# Patient Record
Sex: MALE | Race: WHITE | Employment: OTHER | URBAN - NONMETROPOLITAN AREA
[De-identification: names, ages, dates, MRNs, and addresses within clinical notes are randomized per-mention and may not be internally consistent; named-entity substitution may affect disease eponyms.]

---

## 2019-05-29 LAB
ALBUMIN SERPL-MCNC: 3.9 G/DL
ALP BLD-CCNC: 44 U/L
ALT SERPL-CCNC: 22 U/L
ANION GAP SERPL CALCULATED.3IONS-SCNC: 1.2 MMOL/L
AST SERPL-CCNC: 21 U/L
BASOPHILS ABSOLUTE: NORMAL /ΜL
BASOPHILS RELATIVE PERCENT: NORMAL %
BILIRUB SERPL-MCNC: 1 MG/DL (ref 0.1–1.4)
BUN BLDV-MCNC: 17 MG/DL
CALCIUM SERPL-MCNC: 8.7 MG/DL
CHLORIDE BLD-SCNC: 105 MMOL/L
CHOLESTEROL, TOTAL: 174 MG/DL
CHOLESTEROL/HDL RATIO: 2.1
CO2: 24 MMOL/L
CREAT SERPL-MCNC: 1 MG/DL
EOSINOPHILS ABSOLUTE: NORMAL /ΜL
EOSINOPHILS RELATIVE PERCENT: NORMAL %
GFR CALCULATED: NORMAL
GLUCOSE BLD-MCNC: 107 MG/DL
HCT VFR BLD CALC: 41.5 % (ref 41–53)
HDLC SERPL-MCNC: 54 MG/DL (ref 35–70)
HEMOGLOBIN: 14.2 G/DL (ref 13.5–17.5)
LDL CHOLESTEROL CALCULATED: 113 MG/DL (ref 0–160)
LYMPHOCYTES ABSOLUTE: NORMAL /ΜL
LYMPHOCYTES RELATIVE PERCENT: NORMAL %
MCH RBC QN AUTO: NORMAL PG
MCHC RBC AUTO-ENTMCNC: NORMAL G/DL
MCV RBC AUTO: NORMAL FL
MONOCYTES ABSOLUTE: NORMAL /ΜL
MONOCYTES RELATIVE PERCENT: NORMAL %
NEUTROPHILS ABSOLUTE: NORMAL /ΜL
NEUTROPHILS RELATIVE PERCENT: NORMAL %
PLATELET # BLD: 192 K/ΜL
PMV BLD AUTO: NORMAL FL
POTASSIUM SERPL-SCNC: 4.2 MMOL/L
RBC # BLD: 4.32 10^6/ΜL
SODIUM BLD-SCNC: 136 MMOL/L
TOTAL PROTEIN: 7.2
TRIGL SERPL-MCNC: 104 MG/DL
VLDLC SERPL CALC-MCNC: NORMAL MG/DL
WBC # BLD: 4.4 10^3/ML

## 2019-06-05 ENCOUNTER — OFFICE VISIT (OUTPATIENT)
Dept: PRIMARY CARE CLINIC | Age: 69
End: 2019-06-05
Payer: MEDICARE

## 2019-06-05 VITALS
SYSTOLIC BLOOD PRESSURE: 118 MMHG | BODY MASS INDEX: 31.55 KG/M2 | WEIGHT: 213 LBS | HEART RATE: 66 BPM | TEMPERATURE: 98.2 F | OXYGEN SATURATION: 97 % | HEIGHT: 69 IN | RESPIRATION RATE: 16 BRPM | DIASTOLIC BLOOD PRESSURE: 72 MMHG

## 2019-06-05 DIAGNOSIS — F41.9 ANXIETY: ICD-10-CM

## 2019-06-05 DIAGNOSIS — K51.80 OTHER ULCERATIVE COLITIS WITHOUT COMPLICATION (HCC): ICD-10-CM

## 2019-06-05 DIAGNOSIS — E78.2 MIXED HYPERLIPIDEMIA: ICD-10-CM

## 2019-06-05 DIAGNOSIS — I10 ESSENTIAL HYPERTENSION: Primary | ICD-10-CM

## 2019-06-05 PROBLEM — F34.1 DYSTHYMIA: Status: ACTIVE | Noted: 2019-06-05

## 2019-06-05 PROBLEM — K51.90 ULCERATIVE COLITIS (HCC): Status: ACTIVE | Noted: 2019-06-05

## 2019-06-05 PROCEDURE — 99213 OFFICE O/P EST LOW 20 MIN: CPT | Performed by: INTERNAL MEDICINE

## 2019-06-05 RX ORDER — SERTRALINE HYDROCHLORIDE 100 MG/1
1 TABLET, FILM COATED ORAL DAILY
COMMUNITY
Start: 2019-05-13 | End: 2019-06-05 | Stop reason: SDUPTHER

## 2019-06-05 RX ORDER — LISINOPRIL 5 MG/1
1 TABLET ORAL DAILY
COMMUNITY
Start: 2019-05-13 | End: 2019-06-05 | Stop reason: SDUPTHER

## 2019-06-05 RX ORDER — LISINOPRIL 5 MG/1
5 TABLET ORAL DAILY
Qty: 90 TABLET | Refills: 3 | Status: SHIPPED | OUTPATIENT
Start: 2019-06-05 | End: 2019-12-11

## 2019-06-05 RX ORDER — MERCAPTOPURINE 50 MG/1
50 TABLET ORAL 2 TIMES DAILY
Qty: 180 TABLET | Refills: 3 | Status: SHIPPED | OUTPATIENT
Start: 2019-06-05 | End: 2019-12-11 | Stop reason: SDUPTHER

## 2019-06-05 RX ORDER — FOLIC ACID 1 MG/1
1 TABLET ORAL DAILY
COMMUNITY
Start: 2018-05-23 | End: 2019-06-05 | Stop reason: SDUPTHER

## 2019-06-05 RX ORDER — ATORVASTATIN CALCIUM 10 MG/1
10 TABLET, FILM COATED ORAL DAILY
Qty: 90 TABLET | Refills: 3 | Status: SHIPPED | OUTPATIENT
Start: 2019-06-05 | End: 2019-12-11 | Stop reason: SDUPTHER

## 2019-06-05 RX ORDER — MERCAPTOPURINE 50 MG/1
1 TABLET ORAL 2 TIMES DAILY
COMMUNITY
Start: 2018-06-19 | End: 2019-06-05 | Stop reason: SDUPTHER

## 2019-06-05 RX ORDER — ATORVASTATIN CALCIUM 10 MG/1
1 TABLET, FILM COATED ORAL DAILY
COMMUNITY
Start: 2018-05-23 | End: 2019-06-05 | Stop reason: SDUPTHER

## 2019-06-05 RX ORDER — FOLIC ACID 1 MG/1
1000 TABLET ORAL DAILY
Qty: 90 TABLET | Refills: 3 | Status: SHIPPED | OUTPATIENT
Start: 2019-06-05 | End: 2019-12-11 | Stop reason: SDUPTHER

## 2019-06-05 RX ORDER — SERTRALINE HYDROCHLORIDE 100 MG/1
100 TABLET, FILM COATED ORAL DAILY
Qty: 90 TABLET | Refills: 3 | Status: SHIPPED | OUTPATIENT
Start: 2019-06-05 | End: 2019-12-11 | Stop reason: SDUPTHER

## 2019-06-05 RX ORDER — MESALAMINE 1.2 G/1
1 TABLET, DELAYED RELEASE ORAL 3 TIMES DAILY
COMMUNITY
Start: 2019-03-11 | End: 2019-06-05 | Stop reason: SDUPTHER

## 2019-06-05 RX ORDER — MESALAMINE 1.2 G/1
1.2 TABLET, DELAYED RELEASE ORAL 3 TIMES DAILY
Qty: 270 TABLET | Refills: 3 | Status: SHIPPED | OUTPATIENT
Start: 2019-06-05 | End: 2019-12-11 | Stop reason: SDUPTHER

## 2019-06-05 SDOH — HEALTH STABILITY: MENTAL HEALTH: HOW OFTEN DO YOU HAVE A DRINK CONTAINING ALCOHOL?: 4 OR MORE TIMES A WEEK

## 2019-06-05 SDOH — HEALTH STABILITY: MENTAL HEALTH: HOW MANY STANDARD DRINKS CONTAINING ALCOHOL DO YOU HAVE ON A TYPICAL DAY?: 1 OR 2

## 2019-06-05 ASSESSMENT — PATIENT HEALTH QUESTIONNAIRE - PHQ9
SUM OF ALL RESPONSES TO PHQ QUESTIONS 1-9: 0
2. FEELING DOWN, DEPRESSED OR HOPELESS: 0
SUM OF ALL RESPONSES TO PHQ9 QUESTIONS 1 & 2: 0
SUM OF ALL RESPONSES TO PHQ QUESTIONS 1-9: 0
1. LITTLE INTEREST OR PLEASURE IN DOING THINGS: 0

## 2019-06-05 ASSESSMENT — ENCOUNTER SYMPTOMS
EYE PAIN: 0
CONSTIPATION: 0
TROUBLE SWALLOWING: 0
WHEEZING: 0
ANAL BLEEDING: 0
VOMITING: 0
DIARRHEA: 0
BLOOD IN STOOL: 0
PHOTOPHOBIA: 0
RHINORRHEA: 0
EYE DISCHARGE: 0
NAUSEA: 0
FACIAL SWELLING: 0
COLOR CHANGE: 0
COUGH: 0
STRIDOR: 0
ABDOMINAL PAIN: 0
EYE ITCHING: 0
SORE THROAT: 0
SHORTNESS OF BREATH: 0

## 2019-06-05 NOTE — PROGRESS NOTES
2019    Name: Rafaela Sifuentes : 1950 Sex: male  Age: 80 y.o. Subjective:  Chief Complaint   Patient presents with    Other     6 mo        HPI     Patient has a history of ulcerative colitis under the care of Nnamdi Marx He is scheduled for a colonoscopy with him next week. Has a history of mildly increased PSA. He saw his urologist recently. PSA is slightly elevated at 5.03. Blood work was done last week at [facility]. We'll get the results. Patient has a history of anxiety on Zoloft, hypertension, hyperlipidemia. He takes his medications as directed and has no problems. Review of Systems   Constitutional: Negative for appetite change, fatigue and unexpected weight change. HENT: Negative for congestion, ear pain, facial swelling, rhinorrhea, sore throat, tinnitus and trouble swallowing. Eyes: Negative for photophobia, pain, discharge, itching and visual disturbance. Respiratory: Negative for cough, shortness of breath, wheezing and stridor. Cardiovascular: Negative for chest pain, palpitations and leg swelling. Gastrointestinal: Negative for abdominal pain, anal bleeding, blood in stool, constipation, diarrhea, nausea and vomiting. Endocrine: Negative for cold intolerance, heat intolerance, polydipsia, polyphagia and polyuria. Genitourinary: Negative for difficulty urinating, dysuria, flank pain, frequency, hematuria and urgency. Musculoskeletal: Negative for arthralgias, gait problem, joint swelling and myalgias. Skin: Negative for color change, pallor and rash. Allergic/Immunologic: Negative for environmental allergies and food allergies. Neurological: Negative for dizziness, tremors, seizures, syncope, speech difficulty, weakness, light-headedness, numbness and headaches. Hematological: Negative for adenopathy. Does not bruise/bleed easily.    Psychiatric/Behavioral: Negative for agitation, behavioral problems, confusion, sleep disturbance and suicidal ideas. The patient is not nervous/anxious. Current Outpatient Medications:     lisinopril (PRINIVIL;ZESTRIL) 5 MG tablet, Take 1 tablet by mouth daily, Disp: 90 tablet, Rfl: 3    atorvastatin (LIPITOR) 10 MG tablet, Take 1 tablet by mouth daily, Disp: 90 tablet, Rfl: 3    folic acid (FOLVITE) 1 MG tablet, Take 1 tablet by mouth daily, Disp: 90 tablet, Rfl: 3    sertraline (ZOLOFT) 100 MG tablet, Take 1 tablet by mouth daily, Disp: 90 tablet, Rfl: 3    mercaptopurine (PURINETHOL) 50 MG chemo tablet, Take 1 tablet by mouth 2 times daily, Disp: 180 tablet, Rfl: 3    LIALDA 1.2 g EC tablet, Take 1 tablet by mouth 3 times daily, Disp: 270 tablet, Rfl: 3     No Known Allergies     Past Medical History:   Diagnosis Date    Anxiety 6/5/2019    Essential hypertension 12/5/2017    Mixed hyperlipidemia 6/5/2019    Ulcerative colitis ([facility] [address] 6/5/2019       Health Maintenance Due   Topic Date Due    Potassium monitoring  1950    Creatinine monitoring  1950    AAA screen  1950    Hepatitis C screen  1950    DTaP/Tdap/Td vaccine (1 - Tdap) 12/01/1970    Lipid screen  12/01/1991    Diabetes screen  12/01/1991    Colon cancer screen colonoscopy  12/01/2001    Pneumococcal 65+ years Vaccine (1 of 2 - PCV13) 12/01/2016        Patient Active Problem List   Diagnosis    Essential hypertension    Ulcerative colitis ([facility] [address]    Anxiety    Mixed hyperlipidemia        History reviewed. No pertinent surgical history. Family History   Problem Relation Age of Onset    COPD Mother         Social History     Tobacco Use    Smoking status: Light Tobacco Smoker     Types: Cigars    Smokeless tobacco: Never Used    Tobacco comment: one a day   Substance Use Topics    Alcohol use:  Yes     Alcohol/week: 4.8 oz     Types: 8 Cans of beer per week     Frequency: 4 or more times a week     Drinks per session: 1 or 2     Binge frequency: Never    Drug use: Never        Objective  Vitals: 06/05/19 0804   BP: 118/72   Site: Right Upper Arm   Position: Sitting   Cuff Size: Large Adult   Pulse: 66   Resp: 16   Temp: 98.2 °F (36.8 °C)   TempSrc: Temporal   SpO2: 97%   Weight: 213 lb (96.6 kg)   Height: 5' 9\" (1.753 m)        Exam:  Physical Exam   Constitutional: He is oriented to person, place, and time. He appears well-developed and well-nourished. HENT:   Head: Normocephalic. Right Ear: External ear normal.   Left Ear: External ear normal.   Nose: Nose normal.   Mouth/Throat: Oropharynx is clear and moist. No oropharyngeal exudate. Eyes: Pupils are equal, round, and reactive to light. Conjunctivae and EOM are normal. Right eye exhibits no discharge. Left eye exhibits no discharge. No scleral icterus. Neck: Normal range of motion. Neck supple. No thyromegaly present. Cardiovascular: Normal rate, regular rhythm, normal heart sounds and intact distal pulses. Exam reveals no gallop and no friction rub. No murmur heard. Pulmonary/Chest: Effort normal and breath sounds normal. No respiratory distress. He has no wheezes. He has no rales. He exhibits no tenderness. Abdominal: Soft. Bowel sounds are normal. He exhibits no distension and no mass. There is no tenderness. There is no rebound and no guarding. Musculoskeletal: Normal range of motion. He exhibits no edema, tenderness or deformity. Lymphadenopathy:     He has no cervical adenopathy. Neurological: He is alert and oriented to person, place, and time. He displays normal reflexes. No cranial nerve deficit or sensory deficit. Skin: Skin is warm and dry. No rash noted. No erythema. No pallor. Psychiatric: He has a normal mood and affect. His behavior is normal. Judgment and thought content normal.        Last labs reviewed. ASSESSMENT & PLAN :   Problem List        Circulatory    Essential hypertension - Primary     Blood pressures are stable. Continue medications and monitor blood pressures at home.  Call office if systolics are over 567 over diastolics over 90. Relevant Medications    lisinopril (PRINIVIL;ZESTRIL) 5 MG tablet    atorvastatin (LIPITOR) 10 MG tablet       Digestive    Ulcerative colitis ([address]     Continued Lialda and mercaptopurine and follow-up with GI            Other    Anxiety     Continue sertraline         Relevant Medications    sertraline (ZOLOFT) 100 MG tablet    Mixed hyperlipidemia     Watch saturated fats in diet and will monitor lipids         Relevant Medications    lisinopril (PRINIVIL;ZESTRIL) 5 MG tablet    atorvastatin (LIPITOR) 10 MG tablet           Return in about 6 months (around 12/5/2019).        Manual Ember,   6/5/2019

## 2019-06-05 NOTE — ASSESSMENT & PLAN NOTE
Blood pressures are stable. Continue medications and monitor blood pressures at home. Call office if systolics are over 278 over diastolics over 90.

## 2019-12-03 ENCOUNTER — TELEPHONE (OUTPATIENT)
Dept: PRIMARY CARE CLINIC | Age: 69
End: 2019-12-03

## 2019-12-03 DIAGNOSIS — I10 ESSENTIAL HYPERTENSION: Primary | ICD-10-CM

## 2019-12-03 DIAGNOSIS — E78.2 MIXED HYPERLIPIDEMIA: ICD-10-CM

## 2019-12-03 DIAGNOSIS — K51.80 OTHER ULCERATIVE COLITIS WITHOUT COMPLICATION (HCC): ICD-10-CM

## 2019-12-03 DIAGNOSIS — Z12.5 SCREENING FOR PROSTATE CANCER: ICD-10-CM

## 2019-12-03 DIAGNOSIS — F41.9 ANXIETY: ICD-10-CM

## 2019-12-04 ENCOUNTER — TELEPHONE (OUTPATIENT)
Dept: PRIMARY CARE CLINIC | Age: 69
End: 2019-12-04

## 2019-12-04 DIAGNOSIS — R97.20 PSA ELEVATION: Primary | ICD-10-CM

## 2019-12-04 DIAGNOSIS — R97.20 ELEVATED PSA: ICD-10-CM

## 2019-12-04 LAB
A/G RATIO: 1.1 RATIO (ref 1.1–2.2)
ALBUMIN SERPL-MCNC: 3.9 G/DL (ref 3.4–4.8)
ALP BLD-CCNC: 47 U/L (ref 42–121)
ALT SERPL-CCNC: 23 U/L (ref 10–63)
ANION GAP SERPL CALCULATED.3IONS-SCNC: 7 MEQ/L (ref 3–11)
AST SERPL-CCNC: 20 U/L (ref 10–41)
BASOPHILS ABSOLUTE: 0 K/UL (ref 0–0.2)
BASOPHILS RELATIVE PERCENT: 0.5 % (ref 0–1.5)
BILIRUB SERPL-MCNC: 0.9 MG/DL (ref 0.3–1.5)
BUN BLDV-MCNC: 20 MG/DL (ref 6–20)
CALCIUM SERPL-MCNC: 8.9 MG/DL (ref 8.5–10.5)
CHLORIDE BLD-SCNC: 105 MEQ/L (ref 98–107)
CHOLESTEROL: 177 MG/DL (ref 140–200)
CO2: 25 MEQ/L (ref 21–31)
CREAT SERPL-MCNC: 1.1 MG/DL (ref 0.6–1.3)
DIAGNOSTIC PSA: 5.15 NG/ML (ref 0–4)
EOSINOPHILS ABSOLUTE: 0.1 K/UL (ref 0–0.33)
EOSINOPHILS RELATIVE PERCENT: 1.4 % (ref 0–3)
GFR AFRICAN AMERICAN: 81 ML/MIN
GFR NON-AFRICAN AMERICAN: 67 ML/MIN
GLOBULIN: 3.5 G/DL (ref 1.9–3.9)
GLUCOSE BLD-MCNC: 102 MG/DL (ref 70–99)
HCT VFR BLD CALC: 45.1 % (ref 41–50)
HDLC SERPL-MCNC: 52 MG/DL (ref 29–71)
HEMOGLOBIN: 15.3 G/DL (ref 13.5–16.5)
LDL CHOLESTEROL: 107 MG/DL
LDL/HDL RATIO: 2.1 RATIO
LYMPHOCYTES ABSOLUTE: 1.1 K/UL (ref 1.1–4.8)
LYMPHOCYTES RELATIVE PERCENT: 25 % (ref 24–44)
MCH RBC QN AUTO: 32.6 PG (ref 28–34)
MCHC RBC AUTO-ENTMCNC: 34 G/DL (ref 33–37)
MCV RBC AUTO: 96 FL (ref 80–100)
MONOCYTES ABSOLUTE: 0.4 K/UL (ref 0.2–0.7)
MONOCYTES RELATIVE PERCENT: 8.3 % (ref 3.4–9)
NEUTROPHILS ABSOLUTE: 2.9 K/UL (ref 1.83–8.7)
PDW BLD-RTO: 14.3 % (ref 10.9–14.3)
PLATELET # BLD: 244 K/UL (ref 150–450)
PMV BLD AUTO: 8 FL (ref 7.4–10.4)
POTASSIUM SERPL-SCNC: 4.4 MEQ/L (ref 3.6–5)
RBC # BLD: 4.7 M/UL (ref 4.5–5.5)
SEGMENTED NEUTROPHILS RELATIVE PERCENT: 64.8 % (ref 40–74)
SODIUM BLD-SCNC: 137 MEQ/L (ref 135–145)
TOTAL PROTEIN: 7.4 G/DL (ref 5.9–7.8)
TRIGL SERPL-MCNC: 130 MG/DL (ref 41–189)
WBC # BLD: 4.5 K/UL (ref 4.5–11)

## 2019-12-05 ASSESSMENT — ENCOUNTER SYMPTOMS
COUGH: 0
NAUSEA: 0
ANAL BLEEDING: 0
STRIDOR: 0
SORE THROAT: 0
RHINORRHEA: 0
COLOR CHANGE: 0
SHORTNESS OF BREATH: 0
EYE ITCHING: 0
CONSTIPATION: 0
WHEEZING: 0
FACIAL SWELLING: 0
EYE DISCHARGE: 0
TROUBLE SWALLOWING: 0
VOMITING: 0
PHOTOPHOBIA: 0
ABDOMINAL PAIN: 0
BLOOD IN STOOL: 0
EYE PAIN: 0

## 2019-12-11 ENCOUNTER — OFFICE VISIT (OUTPATIENT)
Dept: PRIMARY CARE CLINIC | Age: 69
End: 2019-12-11
Payer: MEDICARE

## 2019-12-11 VITALS
BODY MASS INDEX: 32.29 KG/M2 | DIASTOLIC BLOOD PRESSURE: 84 MMHG | HEART RATE: 67 BPM | HEIGHT: 69 IN | TEMPERATURE: 97.7 F | WEIGHT: 218 LBS | OXYGEN SATURATION: 97 % | SYSTOLIC BLOOD PRESSURE: 138 MMHG

## 2019-12-11 DIAGNOSIS — F41.9 ANXIETY: ICD-10-CM

## 2019-12-11 DIAGNOSIS — N40.1 BENIGN PROSTATIC HYPERPLASIA WITH URINARY FREQUENCY: ICD-10-CM

## 2019-12-11 DIAGNOSIS — R35.0 BENIGN PROSTATIC HYPERPLASIA WITH URINARY FREQUENCY: ICD-10-CM

## 2019-12-11 DIAGNOSIS — E78.2 MIXED HYPERLIPIDEMIA: ICD-10-CM

## 2019-12-11 DIAGNOSIS — K51.80 OTHER ULCERATIVE COLITIS WITHOUT COMPLICATION (HCC): ICD-10-CM

## 2019-12-11 DIAGNOSIS — I10 ESSENTIAL HYPERTENSION: Primary | ICD-10-CM

## 2019-12-11 PROCEDURE — 3017F COLORECTAL CA SCREEN DOC REV: CPT | Performed by: INTERNAL MEDICINE

## 2019-12-11 PROCEDURE — 4004F PT TOBACCO SCREEN RCVD TLK: CPT | Performed by: INTERNAL MEDICINE

## 2019-12-11 PROCEDURE — 99213 OFFICE O/P EST LOW 20 MIN: CPT | Performed by: INTERNAL MEDICINE

## 2019-12-11 PROCEDURE — G8427 DOCREV CUR MEDS BY ELIG CLIN: HCPCS | Performed by: INTERNAL MEDICINE

## 2019-12-11 PROCEDURE — 1123F ACP DISCUSS/DSCN MKR DOCD: CPT | Performed by: INTERNAL MEDICINE

## 2019-12-11 PROCEDURE — 4040F PNEUMOC VAC/ADMIN/RCVD: CPT | Performed by: INTERNAL MEDICINE

## 2019-12-11 PROCEDURE — G8417 CALC BMI ABV UP PARAM F/U: HCPCS | Performed by: INTERNAL MEDICINE

## 2019-12-11 PROCEDURE — G8482 FLU IMMUNIZE ORDER/ADMIN: HCPCS | Performed by: INTERNAL MEDICINE

## 2019-12-11 RX ORDER — SERTRALINE HYDROCHLORIDE 100 MG/1
100 TABLET, FILM COATED ORAL DAILY
Qty: 90 TABLET | Refills: 3 | Status: SHIPPED
Start: 2019-12-11 | End: 2020-08-11 | Stop reason: SDUPTHER

## 2019-12-11 RX ORDER — ATORVASTATIN CALCIUM 10 MG/1
10 TABLET, FILM COATED ORAL DAILY
Qty: 90 TABLET | Refills: 3 | Status: SHIPPED
Start: 2019-12-11 | End: 2020-08-11 | Stop reason: SDUPTHER

## 2019-12-11 RX ORDER — MERCAPTOPURINE 50 MG/1
50 TABLET ORAL 2 TIMES DAILY
Qty: 180 TABLET | Refills: 3 | Status: SHIPPED
Start: 2019-12-11 | End: 2021-01-06 | Stop reason: SDUPTHER

## 2019-12-11 RX ORDER — LISINOPRIL 5 MG/1
5 TABLET ORAL DAILY
Qty: 90 TABLET | Refills: 3 | Status: SHIPPED
Start: 2019-12-11 | End: 2020-07-28 | Stop reason: ALTCHOICE

## 2019-12-11 RX ORDER — FOLIC ACID 1 MG/1
1000 TABLET ORAL DAILY
Qty: 90 TABLET | Refills: 3 | Status: SHIPPED
Start: 2019-12-11 | End: 2021-01-06 | Stop reason: SDUPTHER

## 2019-12-11 RX ORDER — LISINOPRIL 5 MG/1
TABLET ORAL
COMMUNITY
Start: 2018-05-23 | End: 2019-12-11

## 2019-12-11 RX ORDER — MESALAMINE 1.2 G/1
1.2 TABLET, DELAYED RELEASE ORAL 3 TIMES DAILY
Qty: 270 TABLET | Refills: 3 | Status: SHIPPED
Start: 2019-12-11 | End: 2021-01-06 | Stop reason: SDUPTHER

## 2019-12-11 ASSESSMENT — ENCOUNTER SYMPTOMS: DIARRHEA: 1

## 2020-01-02 PROBLEM — Z12.5 SCREENING FOR PROSTATE CANCER: Status: RESOLVED | Noted: 2019-12-03 | Resolved: 2020-01-02

## 2020-06-17 ENCOUNTER — OFFICE VISIT (OUTPATIENT)
Dept: PRIMARY CARE CLINIC | Age: 70
End: 2020-06-17
Payer: MEDICARE

## 2020-06-17 ENCOUNTER — HOSPITAL ENCOUNTER (OUTPATIENT)
Age: 70
Discharge: HOME | End: 2020-06-19
Payer: MEDICARE

## 2020-06-17 VITALS
SYSTOLIC BLOOD PRESSURE: 122 MMHG | BODY MASS INDEX: 30.36 KG/M2 | HEIGHT: 69 IN | WEIGHT: 205 LBS | DIASTOLIC BLOOD PRESSURE: 78 MMHG | TEMPERATURE: 97.3 F | OXYGEN SATURATION: 97 % | HEART RATE: 71 BPM

## 2020-06-17 PROBLEM — R06.02 SHORTNESS OF BREATH: Status: ACTIVE | Noted: 2020-06-17

## 2020-06-17 PROBLEM — Z13.6 SCREENING FOR AAA (ABDOMINAL AORTIC ANEURYSM): Status: ACTIVE | Noted: 2020-06-17

## 2020-06-17 LAB
ALBUMIN SERPL-MCNC: 4.3 G/DL (ref 3.5–5.2)
ALP BLD-CCNC: 56 U/L (ref 40–129)
ALT SERPL-CCNC: 21 U/L (ref 0–40)
ANION GAP SERPL CALCULATED.3IONS-SCNC: 13 MMOL/L (ref 7–16)
AST SERPL-CCNC: 20 U/L (ref 0–39)
BILIRUB SERPL-MCNC: 0.5 MG/DL (ref 0–1.2)
BUN BLDV-MCNC: 12 MG/DL (ref 8–23)
CALCIUM SERPL-MCNC: 9.1 MG/DL (ref 8.6–10.2)
CHLORIDE BLD-SCNC: 105 MMOL/L (ref 98–107)
CHOLESTEROL, TOTAL: 160 MG/DL (ref 0–199)
CO2: 23 MMOL/L (ref 22–29)
CREAT SERPL-MCNC: 1 MG/DL (ref 0.7–1.2)
GFR AFRICAN AMERICAN: >60
GFR NON-AFRICAN AMERICAN: >60 ML/MIN/1.73
GLUCOSE BLD-MCNC: 112 MG/DL (ref 74–99)
HDLC SERPL-MCNC: 55 MG/DL
LDL CHOLESTEROL CALCULATED: 84 MG/DL (ref 0–99)
POTASSIUM SERPL-SCNC: 4.2 MMOL/L (ref 3.5–5)
PROSTATE SPECIFIC ANTIGEN: 7.71 NG/ML (ref 0–4)
SODIUM BLD-SCNC: 141 MMOL/L (ref 132–146)
TOTAL PROTEIN: 7.6 G/DL (ref 6.4–8.3)
TRIGL SERPL-MCNC: 107 MG/DL (ref 0–149)
VLDLC SERPL CALC-MCNC: 21 MG/DL

## 2020-06-17 PROCEDURE — 36415 COLL VENOUS BLD VENIPUNCTURE: CPT

## 2020-06-17 PROCEDURE — 99214 OFFICE O/P EST MOD 30 MIN: CPT | Performed by: INTERNAL MEDICINE

## 2020-06-17 PROCEDURE — 4040F PNEUMOC VAC/ADMIN/RCVD: CPT | Performed by: INTERNAL MEDICINE

## 2020-06-17 PROCEDURE — 80053 COMPREHEN METABOLIC PANEL: CPT

## 2020-06-17 PROCEDURE — 3017F COLORECTAL CA SCREEN DOC REV: CPT | Performed by: INTERNAL MEDICINE

## 2020-06-17 PROCEDURE — 84153 ASSAY OF PSA TOTAL: CPT

## 2020-06-17 PROCEDURE — 80061 LIPID PANEL: CPT

## 2020-06-17 PROCEDURE — G8417 CALC BMI ABV UP PARAM F/U: HCPCS | Performed by: INTERNAL MEDICINE

## 2020-06-17 PROCEDURE — 4004F PT TOBACCO SCREEN RCVD TLK: CPT | Performed by: INTERNAL MEDICINE

## 2020-06-17 PROCEDURE — 1123F ACP DISCUSS/DSCN MKR DOCD: CPT | Performed by: INTERNAL MEDICINE

## 2020-06-17 PROCEDURE — G8427 DOCREV CUR MEDS BY ELIG CLIN: HCPCS | Performed by: INTERNAL MEDICINE

## 2020-06-17 RX ORDER — TAMSULOSIN HYDROCHLORIDE 0.4 MG/1
0.8 CAPSULE ORAL DAILY
COMMUNITY

## 2020-06-17 ASSESSMENT — ENCOUNTER SYMPTOMS
NAUSEA: 0
SORE THROAT: 0
ANAL BLEEDING: 0
STRIDOR: 0
ABDOMINAL PAIN: 0
DIARRHEA: 0
VOMITING: 0
SHORTNESS OF BREATH: 1
CONSTIPATION: 0
FACIAL SWELLING: 0
TROUBLE SWALLOWING: 0
WHEEZING: 0
PHOTOPHOBIA: 0
EYE DISCHARGE: 0
BLOOD IN STOOL: 0
COUGH: 0
RHINORRHEA: 0
EYE ITCHING: 0
COLOR CHANGE: 0
EYE PAIN: 0

## 2020-06-17 ASSESSMENT — PATIENT HEALTH QUESTIONNAIRE - PHQ9
2. FEELING DOWN, DEPRESSED OR HOPELESS: 0
SUM OF ALL RESPONSES TO PHQ QUESTIONS 1-9: 0
1. LITTLE INTEREST OR PLEASURE IN DOING THINGS: 0
SUM OF ALL RESPONSES TO PHQ9 QUESTIONS 1 & 2: 0
SUM OF ALL RESPONSES TO PHQ QUESTIONS 1-9: 0

## 2020-06-17 NOTE — PROGRESS NOTES
2020    Name: Aspen Lewis : 1950 Sex: male  Age: 77 y.o. Subjective:  Chief Complaint   Patient presents with    Hypertension    6 Month Follow-Up        Hypertension   Associated symptoms include shortness of breath. Pertinent negatives include no chest pain, headaches or palpitations. Patient has a history of ulcerative colitis under the care of Huey Potter He had a colonoscopy done in 2019. This showed diverticuli and mild inflammation. No significant change from previous study. He continues on mercaptopurine and Lialda. He has noticed increased shortness of breath when he does something strenuous. He is able to walk about 4 miles but lately is getting more difficult. Also if he gets up from a sitting position and starts doing a strenuous activity, he gets short of breath. No cough or wheezing. No history of asthma. He does smoke cigars but less than 1 a day. Has a history of mildly increased PSA. He saw his urologist recently. PSA is slightly elevated at 5.18. His urologist passed away so he is looking for a new one. We will send him to  [facility]. He does complain of urgency and frequency during the day with some dribbling. Does not have nocturia. Has a little bit of difficulty starting his urinary stream in the morning. Nisha Cerrato He saw Dr. Jack Kayser  who checked his PSA and started him on Flomax. Patient says the Flomax is not really helping his symptoms as well as he thought it should. I asked him to check with Dr. Adrianne Sanchez regarding the different medication. Patient has a history of anxiety on Zoloft, hypertension, hyperlipidemia. He takes his medications as directed and has no problems. Reviewed blood work done in 2019. Other than the slightly elevated PSA his fasting blood sugar was 102. Lipids and rest of CMP were unremarkable. Reviewed immunizations and he is up-to-date on all his immunizations.   He will be scheduled for annual wellness visit on his Disp: 180 tablet, Rfl: 3    lisinopril (PRINIVIL;ZESTRIL) 5 MG tablet, Take 1 tablet by mouth daily, Disp: 90 tablet, Rfl: 3    sertraline (ZOLOFT) 100 MG tablet, Take 1 tablet by mouth daily, Disp: 90 tablet, Rfl: 3     No Known Allergies     Past Medical History:   Diagnosis Date    Anxiety 6/5/2019    Essential hypertension 12/5/2017    Mixed hyperlipidemia 6/5/2019    Ulcerative colitis ([facility] [address] 6/5/2019       Health Maintenance Due   Topic Date Due    AAA screen  1950    Hepatitis C screen  1950    DTaP/Tdap/Td vaccine (1 - Tdap) 12/01/1970    Diabetes screen  12/01/1991    Annual Wellness Visit (AWV)  06/05/2019        Patient Active Problem List   Diagnosis    Essential hypertension    Ulcerative colitis ([facility] [address]    Anxiety    Mixed hyperlipidemia    Elevated PSA    Benign prostatic hyperplasia with urinary frequency    Shortness of breath    Screening for AAA (abdominal aortic aneurysm)        Past Surgical History:   Procedure Laterality Date    DENTAL SURGERY      extraction        Family History   Problem Relation Age of Onset    COPD Mother         Social History     Tobacco Use    Smoking status: Light Tobacco Smoker     Types: Cigars    Smokeless tobacco: Never Used    Tobacco comment: one a day   Substance Use Topics    Alcohol use: Yes     Alcohol/week: 8.0 standard drinks     Types: 8 Cans of beer per week     Frequency: 4 or more times a week     Drinks per session: 1 or 2     Binge frequency: Never    Drug use: Never        Objective  Vitals:    06/17/20 0808   BP: 122/78   Site: Right Upper Arm   Position: Sitting   Cuff Size: Medium Adult   Pulse: 71   Temp: 97.3 °F (36.3 °C)   TempSrc: Temporal   SpO2: 97%   Weight: 205 lb (93 kg)   Height: 5' 9\" (1.753 m)        Exam:  Physical Exam  Vitals signs reviewed. Constitutional:       General: He is not in acute distress. Appearance: Normal appearance. He is well-developed. He is not ill-appearing.    HENT: Head: Normocephalic. Right Ear: External ear normal.      Left Ear: External ear normal.   Eyes:      General: No scleral icterus. Right eye: No discharge. Left eye: No discharge. Conjunctiva/sclera: Conjunctivae normal.      Pupils: Pupils are equal, round, and reactive to light. Neck:      Musculoskeletal: Normal range of motion and neck supple. Thyroid: No thyromegaly. Cardiovascular:      Rate and Rhythm: Normal rate and regular rhythm. Heart sounds: Normal heart sounds. No murmur. No friction rub. No gallop. Pulmonary:      Effort: Pulmonary effort is normal. No respiratory distress. Breath sounds: Normal breath sounds. No wheezing or rales. Chest:      Chest wall: No tenderness. Abdominal:      General: Bowel sounds are normal. There is no distension. Palpations: Abdomen is soft. There is no mass. Tenderness: There is no abdominal tenderness. There is no guarding or rebound. Musculoskeletal: Normal range of motion. General: No tenderness or deformity. Lymphadenopathy:      Cervical: No cervical adenopathy. Skin:     General: Skin is warm and dry. Coloration: Skin is not pale. Findings: No erythema or rash. Neurological:      General: No focal deficit present. Mental Status: He is alert and oriented to person, place, and time. Cranial Nerves: No cranial nerve deficit. Sensory: No sensory deficit. Deep Tendon Reflexes: Reflexes normal.   Psychiatric:         Mood and Affect: Mood normal.         Behavior: Behavior normal.         Thought Content: Thought content normal.         Judgment: Judgment normal.          Last labs reviewed. ASSESSMENT & PLAN :   Problem List        Circulatory    Essential hypertension - Primary     Blood pressures are stable. Continue medications and monitor blood pressures at home. Call office if systolics are over 320 over diastolics over 90.          Relevant Medications lisinopril (PRINIVIL;ZESTRIL) 5 MG tablet    Other Relevant Orders    Comprehensive Metabolic Panel       Digestive    Ulcerative colitis ([facility] [address]     Continue medications per GI and follow-up with him as directed         Relevant Medications    folic acid (FOLVITE) 1 MG tablet    LIALDA 1.2 g EC tablet    mercaptopurine (PURINETHOL) 50 MG chemo tablet       Other    Anxiety     Continue sertraline         Relevant Medications    sertraline (ZOLOFT) 100 MG tablet    Mixed hyperlipidemia     Watch saturated fats in diet and will monitor lipids         Relevant Medications    atorvastatin (LIPITOR) 10 MG tablet    lisinopril (PRINIVIL;ZESTRIL) 5 MG tablet    Other Relevant Orders    Lipid Panel    Elevated PSA     Call Dr. Neida Kan regarding Flomax and lack of symptom resolution         Relevant Orders    PSA, DIAGNOSTIC    Shortness of breath     Chest x-ray and spirometry. We will let him know the results. He may benefit from albuterol HFA inhaler prior to exertion         Relevant Orders    XR CHEST STANDARD (2 VW)    Spirometry With Bronchodilator    Screening for AAA (abdominal aortic aneurysm)     He is of Medicare age and has never had a screening ultrasound for presence of aortic abdominal aneurysm. We will get that scheduled         Relevant Orders    [address]           Return in about 6 months (around 12/17/2020) for hypertension and AWV.        Justin Abreu, DO  6/17/2020

## 2020-06-17 NOTE — ASSESSMENT & PLAN NOTE
He is of Medicare age and has never had a screening ultrasound for presence of aortic abdominal aneurysm.   We will get that scheduled

## 2020-06-18 ENCOUNTER — TELEPHONE (OUTPATIENT)
Dept: PRIMARY CARE CLINIC | Age: 70
End: 2020-06-18

## 2020-06-18 NOTE — TELEPHONE ENCOUNTER
I had a call from patient. He is concerned about his dyspnea on exertion. We have ordered pulmonary function tests and a chest x-ray on him.   If this does not show any abnormality then we will refer him to cardiology for further evaluation but will have to get an EKG first.  Patient was informed of this and is agreeable to this approach

## 2020-07-17 PROBLEM — Z13.6 SCREENING FOR AAA (ABDOMINAL AORTIC ANEURYSM): Status: RESOLVED | Noted: 2020-06-17 | Resolved: 2020-07-17

## 2020-07-20 ENCOUNTER — TELEPHONE (OUTPATIENT)
Dept: PRIMARY CARE CLINIC | Age: 70
End: 2020-07-20

## 2020-07-27 ASSESSMENT — ENCOUNTER SYMPTOMS
VOMITING: 0
COUGH: 0
BLOOD IN STOOL: 0
EYE PAIN: 0
NAUSEA: 0
FACIAL SWELLING: 0
CONSTIPATION: 0
ANAL BLEEDING: 0
SORE THROAT: 0
WHEEZING: 0
SHORTNESS OF BREATH: 1
ABDOMINAL PAIN: 0
COLOR CHANGE: 0
PHOTOPHOBIA: 0
TROUBLE SWALLOWING: 0
STRIDOR: 0
DIARRHEA: 0
EYE ITCHING: 0
EYE DISCHARGE: 0
RHINORRHEA: 0

## 2020-07-27 NOTE — PROGRESS NOTES
should. I asked him to check with Dr. Melanie Nunez regarding the different medication. Repeat PSA done in June 2020 had increased slightly to 7.71. Patient has a history of anxiety on Zoloft, hypertension, hyperlipidemia. He takes his medications as directed and has no problems. He has mild hyperglycemia with a fasting blood sugar of 112. We will check his A1c    He has benign tremor which improves when he drinks alcohol. He is on lisinopril for blood pressure I like to switch him over to a beta-blocker which should improve his tremor hopefully it will not worsen his breathing. Reviewed blood work done in June 2020. Lipids were good. Fasting blood sugar was 112 otherwise CMP was unremarkable his PSA was slightly elevated as above    Reviewed immunizations and he is up-to-date on all his immunizations. He will be scheduled for annual wellness visit on in the near future. Review of Systems   Constitutional: Negative for appetite change, fatigue and unexpected weight change. HENT: Negative for congestion, ear pain, facial swelling, rhinorrhea, sore throat, tinnitus and trouble swallowing. Eyes: Negative for photophobia, pain, discharge, itching and visual disturbance. Respiratory: Positive for shortness of breath. Negative for cough, wheezing and stridor. See HPI   Cardiovascular: Negative for chest pain, palpitations and leg swelling. Gastrointestinal: Negative for abdominal pain, anal bleeding, blood in stool, constipation, diarrhea, nausea and vomiting. Endocrine: Negative for cold intolerance, heat intolerance, polydipsia, polyphagia and polyuria. Genitourinary: Positive for frequency and urgency. Negative for difficulty urinating, dysuria, flank pain and hematuria. Musculoskeletal: Negative for arthralgias, gait problem, joint swelling and myalgias. Skin: Negative for color change, pallor and rash. Allergic/Immunologic: Negative for environmental allergies and food allergies.  Smoking status: Light Tobacco Smoker     Types: Cigars    Smokeless tobacco: Never Used    Tobacco comment: one a day   Substance Use Topics    Alcohol use: Yes     Alcohol/week: 8.0 standard drinks     Types: 8 Cans of beer per week     Frequency: 4 or more times a week     Drinks per session: 1 or 2     Binge frequency: Never    Drug use: Never        Objective  Vitals:    07/28/20 0826   BP: 132/80   Site: Right Upper Arm   Position: Sitting   Cuff Size: Medium Adult   Pulse: 77   Temp: 97.3 °F (36.3 °C)   TempSrc: Temporal   SpO2: 97%   Weight: 199 lb (90.3 kg)   Height: 5' 9\" (1.753 m)        Exam:  Physical Exam  Vitals signs reviewed. Constitutional:       General: He is not in acute distress. Appearance: Normal appearance. He is well-developed. He is not ill-appearing. HENT:      Head: Normocephalic. Right Ear: External ear normal.      Left Ear: External ear normal.   Eyes:      General: No scleral icterus. Right eye: No discharge. Left eye: No discharge. Conjunctiva/sclera: Conjunctivae normal.      Pupils: Pupils are equal, round, and reactive to light. Neck:      Musculoskeletal: Normal range of motion and neck supple. Thyroid: No thyromegaly. Cardiovascular:      Rate and Rhythm: Normal rate and regular rhythm. Heart sounds: Normal heart sounds. No murmur. No friction rub. No gallop. Pulmonary:      Effort: Pulmonary effort is normal. No respiratory distress. Breath sounds: Normal breath sounds. No wheezing or rales. Chest:      Chest wall: No tenderness. Abdominal:      General: Bowel sounds are normal. There is no distension. Palpations: Abdomen is soft. There is no mass. Tenderness: There is no abdominal tenderness. There is no guarding or rebound. Musculoskeletal: Normal range of motion. General: No tenderness or deformity. Lymphadenopathy:      Cervical: No cervical adenopathy.    Skin:     General: Skin is warm and dry. Coloration: Skin is not pale. Findings: No erythema or rash. Neurological:      General: No focal deficit present. Mental Status: He is alert and oriented to person, place, and time. Cranial Nerves: No cranial nerve deficit. Sensory: No sensory deficit. Deep Tendon Reflexes: Reflexes normal.   Psychiatric:         Mood and Affect: Mood normal.         Behavior: Behavior normal.         Thought Content: Thought content normal.         Judgment: Judgment normal.          Last labs reviewed. ASSESSMENT & PLAN :   Problem List        Circulatory    Essential hypertension     Propranolol 60 mg daily discontinue lisinopril monitor his blood pressures and adjust medications if elevated         Relevant Medications    propranolol (INDERAL LA) 60 MG extended release capsule       Other    Anxiety     Continue sertraline as it is working for him         Relevant Medications    sertraline (ZOLOFT) 100 MG tablet    Mixed hyperlipidemia     Watch saturated fats in diet and will monitor lipids  Continue atorvastatin         Relevant Medications    atorvastatin (LIPITOR) 10 MG tablet    propranolol (INDERAL LA) 60 MG extended release capsule    Elevated PSA     Follow-up with his urologist in a few months. He will determine when to recheck the next PSA         Shortness of breath     Refer to pulmonologist for further evaluation         Relevant Orders    Amb External Referral To Pulmonology           Return in about 5 months (around 1/6/2021).        MRO Crenorm, DO  7/28/2020

## 2020-07-28 ENCOUNTER — OFFICE VISIT (OUTPATIENT)
Dept: PRIMARY CARE CLINIC | Age: 70
End: 2020-07-28
Payer: MEDICARE

## 2020-07-28 VITALS
BODY MASS INDEX: 29.47 KG/M2 | WEIGHT: 199 LBS | OXYGEN SATURATION: 97 % | HEIGHT: 69 IN | DIASTOLIC BLOOD PRESSURE: 80 MMHG | HEART RATE: 77 BPM | SYSTOLIC BLOOD PRESSURE: 132 MMHG | TEMPERATURE: 97.3 F

## 2020-07-28 PROCEDURE — 1123F ACP DISCUSS/DSCN MKR DOCD: CPT | Performed by: INTERNAL MEDICINE

## 2020-07-28 PROCEDURE — 4040F PNEUMOC VAC/ADMIN/RCVD: CPT | Performed by: INTERNAL MEDICINE

## 2020-07-28 PROCEDURE — 3017F COLORECTAL CA SCREEN DOC REV: CPT | Performed by: INTERNAL MEDICINE

## 2020-07-28 PROCEDURE — 99214 OFFICE O/P EST MOD 30 MIN: CPT | Performed by: INTERNAL MEDICINE

## 2020-07-28 PROCEDURE — G8427 DOCREV CUR MEDS BY ELIG CLIN: HCPCS | Performed by: INTERNAL MEDICINE

## 2020-07-28 PROCEDURE — 3023F SPIROM DOC REV: CPT | Performed by: INTERNAL MEDICINE

## 2020-07-28 PROCEDURE — 4004F PT TOBACCO SCREEN RCVD TLK: CPT | Performed by: INTERNAL MEDICINE

## 2020-07-28 PROCEDURE — G8926 SPIRO NO PERF OR DOC: HCPCS | Performed by: INTERNAL MEDICINE

## 2020-07-28 PROCEDURE — G8417 CALC BMI ABV UP PARAM F/U: HCPCS | Performed by: INTERNAL MEDICINE

## 2020-07-28 RX ORDER — PROPRANOLOL HCL 60 MG
60 CAPSULE, EXTENDED RELEASE 24HR ORAL DAILY
Qty: 30 CAPSULE | Refills: 5 | Status: ON HOLD
Start: 2020-07-28 | End: 2020-09-05 | Stop reason: HOSPADM

## 2020-07-28 NOTE — LETTER
[address]  Phone: [phone]  Fax: [phone]    Cecilio Tsai DO        July 28, 2020      Mae Angeles MD      Dear Dr. Ameena Ayala is a 41-year-old male who has complained of increasing shortness of breath on exertion. This is been going on for several months. He was a smoker but quit cigarettes years ago. He only smokes an occasional cigar which he has recently quit. Chest x-ray showed some hyperinflation and spirometry showed possible mild obstructive lung disease. There was no response to inhaled bronchodilators. I have asked him to see you regarding further evaluation and possible treatment. He has a history of ulcerative colitis under the care of Dr. Mindi Lopez, hypertension, benign tremor, hyperlipidemia, anxiety, BPH under the care of Dr. Bower Poisson    Thank you for seeing this patient.     Sincerely,        Cecilio Tsai DO

## 2020-07-28 NOTE — ASSESSMENT & PLAN NOTE
Propranolol 60 mg daily discontinue lisinopril monitor his blood pressures and adjust medications if elevated

## 2020-08-04 ENCOUNTER — TELEPHONE (OUTPATIENT)
Dept: PRIMARY CARE CLINIC | Age: 70
End: 2020-08-04

## 2020-08-04 NOTE — TELEPHONE ENCOUNTER
I will refer him to Dr. Troy Esparza.   He can decide if patient needs a stress test after he sees him

## 2020-08-04 NOTE — TELEPHONE ENCOUNTER
Pt called in and states that he would like you to order him a stress test for his COPD.  He said he sees Dr. Sivan Hernandez in a few weeks but would also like that stress test just because he has never had one and it would make him feel better Elma Domínguez is the Dr he would like to see

## 2020-08-06 ENCOUNTER — TELEPHONE (OUTPATIENT)
Dept: CARDIOLOGY CLINIC | Age: 70
End: 2020-08-06

## 2020-08-06 NOTE — TELEPHONE ENCOUNTER
Patient Appointment Form:      PCP:Dr. Cheryl Sotomayor  Referring: Cheryl Sotomayor  Has the Patient:    Seen a Cardiologist? no    Had a heart catheterization? no    Had heart surgery? no    Had a stress test or nuclear stress test? no    Had an echocardiogram? no    Had a vascular ultrasound? no    Had a 24/48 heart monitor or extended cardiac event monitor? no    Had recent blood work in the last 6 months? yes    date: 06/2020    ordering physician: [facility]    Had a pacemaker/ICD/ILR implant? no    Seen an Electrophysiologist? no        Will send records via: in [address] [place] [address]      Date & time of appointment:  06/10/2020 @8:15 Maggy Nixon

## 2020-08-10 ENCOUNTER — HOSPITAL ENCOUNTER (OUTPATIENT)
Age: 70
Discharge: HOME | End: 2020-08-12
Payer: MEDICARE

## 2020-08-10 ENCOUNTER — HOSPITAL ENCOUNTER (OUTPATIENT)
Age: 70
Discharge: HOME | End: 2020-08-10
Payer: MEDICARE

## 2020-08-10 ENCOUNTER — TELEPHONE (OUTPATIENT)
Dept: CARDIOLOGY CLINIC | Age: 70
End: 2020-08-10

## 2020-08-10 ENCOUNTER — OFFICE VISIT (OUTPATIENT)
Dept: CARDIOLOGY CLINIC | Age: 70
End: 2020-08-10
Payer: MEDICARE

## 2020-08-10 VITALS
HEIGHT: 69 IN | HEART RATE: 61 BPM | BODY MASS INDEX: 29.47 KG/M2 | SYSTOLIC BLOOD PRESSURE: 140 MMHG | DIASTOLIC BLOOD PRESSURE: 90 MMHG | WEIGHT: 199 LBS | TEMPERATURE: 97.3 F

## 2020-08-10 PROBLEM — J44.9 COPD (CHRONIC OBSTRUCTIVE PULMONARY DISEASE) (HCC): Status: ACTIVE | Noted: 2020-08-10

## 2020-08-10 LAB
ANION GAP SERPL CALCULATED.3IONS-SCNC: 11 MMOL/L (ref 7–16)
BUN BLDV-MCNC: 17 MG/DL (ref 8–23)
CALCIUM SERPL-MCNC: 9.3 MG/DL (ref 8.6–10.2)
CHLORIDE BLD-SCNC: 103 MMOL/L (ref 98–107)
CO2: 25 MMOL/L (ref 22–29)
CREAT SERPL-MCNC: 1 MG/DL (ref 0.7–1.2)
GFR AFRICAN AMERICAN: >60
GFR NON-AFRICAN AMERICAN: >60 ML/MIN/1.73
GLUCOSE BLD-MCNC: 104 MG/DL (ref 74–99)
HCT VFR BLD CALC: 44.2 % (ref 37–54)
HEMOGLOBIN: 14.9 G/DL (ref 12.5–16.5)
INR BLD: 1.1
MCH RBC QN AUTO: 32.9 PG (ref 26–35)
MCHC RBC AUTO-ENTMCNC: 33.7 % (ref 32–34.5)
MCV RBC AUTO: 97.6 FL (ref 80–99.9)
PDW BLD-RTO: 13.9 FL (ref 11.5–15)
PLATELET # BLD: 209 E9/L (ref 130–450)
PMV BLD AUTO: 10.4 FL (ref 7–12)
POTASSIUM SERPL-SCNC: 4.2 MMOL/L (ref 3.5–5)
PROTHROMBIN TIME: 12.7 SEC (ref 9.3–12.4)
RBC # BLD: 4.53 E12/L (ref 3.8–5.8)
SODIUM BLD-SCNC: 139 MMOL/L (ref 132–146)
WBC # BLD: 4.6 E9/L (ref 4.5–11.5)

## 2020-08-10 PROCEDURE — 4040F PNEUMOC VAC/ADMIN/RCVD: CPT | Performed by: INTERNAL MEDICINE

## 2020-08-10 PROCEDURE — 3023F SPIROM DOC REV: CPT | Performed by: INTERNAL MEDICINE

## 2020-08-10 PROCEDURE — G8926 SPIRO NO PERF OR DOC: HCPCS | Performed by: INTERNAL MEDICINE

## 2020-08-10 PROCEDURE — 85027 COMPLETE CBC AUTOMATED: CPT

## 2020-08-10 PROCEDURE — 1123F ACP DISCUSS/DSCN MKR DOCD: CPT | Performed by: INTERNAL MEDICINE

## 2020-08-10 PROCEDURE — 4004F PT TOBACCO SCREEN RCVD TLK: CPT | Performed by: INTERNAL MEDICINE

## 2020-08-10 PROCEDURE — 3017F COLORECTAL CA SCREEN DOC REV: CPT | Performed by: INTERNAL MEDICINE

## 2020-08-10 PROCEDURE — G8427 DOCREV CUR MEDS BY ELIG CLIN: HCPCS | Performed by: INTERNAL MEDICINE

## 2020-08-10 PROCEDURE — 85610 PROTHROMBIN TIME: CPT

## 2020-08-10 PROCEDURE — U0003 INFECTIOUS AGENT DETECTION BY NUCLEIC ACID (DNA OR RNA); SEVERE ACUTE RESPIRATORY SYNDROME CORONAVIRUS 2 (SARS-COV-2) (CORONAVIRUS DISEASE [COVID-19]), AMPLIFIED PROBE TECHNIQUE, MAKING USE OF HIGH THROUGHPUT TECHNOLOGIES AS DESCRIBED BY CMS-2020-01-R: HCPCS

## 2020-08-10 PROCEDURE — G8417 CALC BMI ABV UP PARAM F/U: HCPCS | Performed by: INTERNAL MEDICINE

## 2020-08-10 PROCEDURE — 93000 ELECTROCARDIOGRAM COMPLETE: CPT | Performed by: INTERNAL MEDICINE

## 2020-08-10 PROCEDURE — 80048 BASIC METABOLIC PNL TOTAL CA: CPT

## 2020-08-10 PROCEDURE — 99205 OFFICE O/P NEW HI 60 MIN: CPT | Performed by: INTERNAL MEDICINE

## 2020-08-10 PROCEDURE — 36415 COLL VENOUS BLD VENIPUNCTURE: CPT

## 2020-08-10 RX ORDER — LISINOPRIL 5 MG/1
5 TABLET ORAL DAILY
COMMUNITY
End: 2020-08-27 | Stop reason: DRUGHIGH

## 2020-08-10 NOTE — PROGRESS NOTES
Chief Complaint   Patient presents with    Shortness of Breath       Patient Active Problem List    Diagnosis Date Noted    COPD (chronic obstructive pulmonary disease) ([facility] 08/10/2020    Shortness of breath 06/17/2020    Elevated PSA 12/04/2019    Ulcerative colitis ([facility] 06/05/2019    Anxiety 06/05/2019    Mixed hyperlipidemia 06/05/2019    Essential hypertension 12/05/2017    Benign prostatic hyperplasia with urinary frequency 11/16/2016       Current Outpatient Medications   Medication Sig Dispense Refill    lisinopril (PRINIVIL;ZESTRIL) 5 MG tablet Take 5 mg by mouth daily      tamsulosin (FLOMAX) 0.4 MG capsule Take 0.4 mg by mouth daily      atorvastatin (LIPITOR) 10 MG tablet Take 1 tablet by mouth daily 90 tablet 3    folic acid (FOLVITE) 1 MG tablet Take 1 tablet by mouth daily 90 tablet 3    LIALDA 1.2 g EC tablet Take 1 tablet by mouth 3 times daily 270 tablet 3    mercaptopurine (PURINETHOL) 50 MG chemo tablet Take 1 tablet by mouth 2 times daily 180 tablet 3    sertraline (ZOLOFT) 100 MG tablet Take 1 tablet by mouth daily 90 tablet 3    propranolol (INDERAL LA) 60 MG extended release capsule Take 1 capsule by mouth daily (Patient not taking: Reported on 8/10/2020) 30 capsule 5     No current facility-administered medications for this visit.          No Known Allergies    Vitals:    08/10/20 0813   BP: (!) 140/90   Pulse: 61   Temp: 97.3 °F (36.3 °C)   Weight: 199 lb (90.3 kg)   Height: 5' 9\" (1.753 m)       Social History     Socioeconomic History    Marital status: Single     Spouse name: Not on file    Number of children: Not on file    Years of education: Not on file    Highest education level: Not on file   Occupational History    Not on file   Social Needs    Financial resource strain: Not on file    Food insecurity     Worry: Not on file     Inability: Not on file    Transportation needs     Medical: Not on file     Non-medical: Not on file   Tobacco Use    Smoking status: Light Tobacco Smoker     Types: Cigars    Smokeless tobacco: Never Used    Tobacco comment: one a day   Substance and Sexual Activity    Alcohol use: Yes     Alcohol/week: 8.0 standard drinks     Types: 8 Cans of beer per week     Frequency: 4 or more times a week     Drinks per session: 1 or 2     Binge frequency: Never    Drug use: Never    Sexual activity: Not Currently   Lifestyle    Physical activity     Days per week: Not on file     Minutes per session: Not on file    Stress: Not on file   Relationships    Social connections     Talks on phone: Not on file     Gets together: Not on file     Attends Confucianism service: Not on file     Active member of club or organization: Not on file     Attends meetings of clubs or organizations: Not on file     Relationship status: Not on file    Intimate partner violence     Fear of current or ex partner: Not on file     Emotionally abused: Not on file     Physically abused: Not on file     Forced sexual activity: Not on file   Other Topics Concern    Not on file   Social History Narrative    Not on file       Family History   Problem Relation Age of Onset    COPD Mother          SUBJECTIVE: Ximena Montilla presents to the office today for consult - dr. Timmothy Skiff - for CRUZ. No prior cardiac hx. Risk factors HTN, HLD, Mom with CAD (advanced age). He is a difficult historian, but has had a change in his functional capacity over the last several months. He complains of dyspnea and denies   chest pain, chest pressure/discomfort, claudication, exertional chest pressure/discomfort, fatigue, irregular heart beat, lower extremity edema, near-syncope, orthopnea, palpitations, paroxysmal nocturnal dyspnea, syncope and tachypnea. Again, difficult historian, but he has had multiple occasions during which he has had to stop his walks because of profound breathlessness. CXR with \"suggestion of COPD\", but Amma spirometer only mild disease by patient report.   david had

## 2020-08-10 NOTE — TELEPHONE ENCOUNTER
JUAQUIN/COVID scheduled today at [facility] for cardiac cath scheduled for 8/14/20. Patient advised to self-quarantine after testing until procedure. Patient states he understands and will comply.

## 2020-08-11 LAB
SARS-COV-2: NOT DETECTED
SOURCE: NORMAL

## 2020-08-11 RX ORDER — SERTRALINE HYDROCHLORIDE 100 MG/1
100 TABLET, FILM COATED ORAL DAILY
Qty: 90 TABLET | Refills: 3 | Status: SHIPPED
Start: 2020-08-11 | End: 2021-01-06 | Stop reason: SDUPTHER

## 2020-08-11 RX ORDER — ATORVASTATIN CALCIUM 10 MG/1
10 TABLET, FILM COATED ORAL DAILY
Qty: 90 TABLET | Refills: 3 | Status: SHIPPED
Start: 2020-08-11 | End: 2021-01-06 | Stop reason: SDUPTHER

## 2020-08-11 NOTE — TELEPHONE ENCOUNTER
Last Appointment:  7/28/2020  Future Appointments   Date Time Provider Abrahan Hester   8/14/2020  8:30 AM [facility] CVL 01 SEYZ CHELO De La Torre   1/6/2021  7:00 AM [facility],  [facility]       Refills pending

## 2020-08-14 ENCOUNTER — HOSPITAL ENCOUNTER (OUTPATIENT)
Dept: CARDIAC CATH/INVASIVE PROCEDURES | Age: 70
Discharge: HOME | End: 2020-08-14
Payer: MEDICARE

## 2020-08-14 ENCOUNTER — TELEPHONE (OUTPATIENT)
Dept: CARDIOLOGY CLINIC | Age: 70
End: 2020-08-14

## 2020-08-14 VITALS
BODY MASS INDEX: 29.47 KG/M2 | DIASTOLIC BLOOD PRESSURE: 114 MMHG | SYSTOLIC BLOOD PRESSURE: 154 MMHG | WEIGHT: 199 LBS | HEIGHT: 69 IN | TEMPERATURE: 97.2 F | OXYGEN SATURATION: 97 % | RESPIRATION RATE: 20 BRPM

## 2020-08-14 LAB
ABO/RH: NORMAL
ANTIBODY SCREEN: NORMAL
LV EF: 60 %
LVEF MODALITY: NORMAL

## 2020-08-14 PROCEDURE — 2500000003 HC RX 250 WO HCPCS

## 2020-08-14 PROCEDURE — 86850 RBC ANTIBODY SCREEN: CPT

## 2020-08-14 PROCEDURE — 36415 COLL VENOUS BLD VENIPUNCTURE: CPT

## 2020-08-14 PROCEDURE — 86900 BLOOD TYPING SEROLOGIC ABO: CPT

## 2020-08-14 PROCEDURE — 6360000002 HC RX W HCPCS

## 2020-08-14 PROCEDURE — 2709999900 HC NON-CHARGEABLE SUPPLY

## 2020-08-14 PROCEDURE — 93458 L HRT ARTERY/VENTRICLE ANGIO: CPT | Performed by: INTERNAL MEDICINE

## 2020-08-14 PROCEDURE — C1769 GUIDE WIRE: HCPCS

## 2020-08-14 PROCEDURE — 86901 BLOOD TYPING SEROLOGIC RH(D): CPT

## 2020-08-14 PROCEDURE — C1887 CATHETER, GUIDING: HCPCS

## 2020-08-14 PROCEDURE — C1894 INTRO/SHEATH, NON-LASER: HCPCS

## 2020-08-14 RX ORDER — ACETAMINOPHEN 325 MG/1
650 TABLET ORAL EVERY 4 HOURS PRN
Status: DISCONTINUED | OUTPATIENT
Start: 2020-08-14 | End: 2020-08-15 | Stop reason: HOSPADM

## 2020-08-14 NOTE — TELEPHONE ENCOUNTER
MD Jeff Luciano       Cath ok   Needs echo : dx: sob and PHTN   Previous Messages       ----- Message -----   From: Moe Camacho DO   Sent: 8/14/2020   9:36 AM EDT   To: Qi Lake MD   Subject: Inpatient Notes        Patient notified and instructed on cath results and need for echo. He stated understanding.   Echo will be ordered

## 2020-08-14 NOTE — PROCEDURES
Pressures were obtained. It was filled with contrast.  This was then manipulated into the left main coronary artery. 4 orthogonal views were obtained. The TIG catheter continue to subselect into the conus. A 5 [place] JR4 catheter was then manipulated into the right coronary artery and 2 orthogonal views were then obtained. A 5 Latvian angled pigtail was then advanced & manipulated into the left ventricle. This was then aspirated & flushed with saline & pressures were obtained. An [place] view was then obtained. The catheter was then aspirated & flushed with saline once again & pull back pressures were then obtained across the aortic vlave. The right radial artery sheath was removed and a vasc band was then placed with good patent hemostasis. They tolerated the procedure well with no complications.

## 2020-08-19 ENCOUNTER — TELEPHONE (OUTPATIENT)
Dept: CARDIOLOGY | Age: 70
End: 2020-08-19

## 2020-08-24 ENCOUNTER — HOSPITAL ENCOUNTER (OUTPATIENT)
Dept: CARDIOLOGY | Age: 70
Discharge: HOME | End: 2020-08-24
Payer: MEDICARE

## 2020-08-24 LAB
LV EF: 58 %
LVEF MODALITY: NORMAL

## 2020-08-24 PROCEDURE — 93306 TTE W/DOPPLER COMPLETE: CPT

## 2020-08-25 ENCOUNTER — TELEPHONE (OUTPATIENT)
Dept: CARDIOLOGY CLINIC | Age: 70
End: 2020-08-25

## 2020-08-25 NOTE — TELEPHONE ENCOUNTER
----- Message from Torito Wagner MD sent at 8/25/2020 10:07 AM EDT -----  Echo says we only have to adjust his BP meds but I need an exact listing of what he is actually taking now  All his meds - names and doses and frequencies.

## 2020-08-25 NOTE — TELEPHONE ENCOUNTER
Patient states this is his current medication list and how he takes it:    Atorvastatin 10mg   1 tablet daily  Sertraline 100mg     1 tablet daily  Lisinopril 5mg           1 tablet daily  Propranolol 60mg (currently not taking,told to switch after lisinopril was done)  tamsulosin 0.4           1 tablet daily  Folic acid 1mg           1 tablet daily  lialda 1.2g                   3 tablets daily  Mercaptopurine 50mg 2 tablets daily

## 2020-08-27 RX ORDER — METOPROLOL SUCCINATE 25 MG/1
25 TABLET, EXTENDED RELEASE ORAL DAILY
Qty: 30 TABLET | Refills: 5 | Status: ON HOLD
Start: 2020-08-27 | End: 2020-09-05 | Stop reason: HOSPADM

## 2020-08-27 RX ORDER — LISINOPRIL 10 MG/1
10 TABLET ORAL DAILY
Qty: 30 TABLET | Refills: 5 | Status: ON HOLD
Start: 2020-08-27 | End: 2020-09-05 | Stop reason: HOSPADM

## 2020-09-02 ENCOUNTER — HOSPITAL ENCOUNTER (INPATIENT)
Age: 70
LOS: 3 days | Discharge: HOME | DRG: 175 | End: 2020-09-05
Attending: INTERNAL MEDICINE | Admitting: INTERNAL MEDICINE
Payer: MEDICARE

## 2020-09-02 ENCOUNTER — HOSPITAL ENCOUNTER (EMERGENCY)
Age: 70
Discharge: ANOTHER HOSPITAL | End: 2020-09-02
Attending: EMERGENCY MEDICINE
Payer: MEDICARE

## 2020-09-02 ENCOUNTER — APPOINTMENT (OUTPATIENT)
Dept: CT IMAGING | Age: 70
End: 2020-09-02
Payer: MEDICARE

## 2020-09-02 VITALS
OXYGEN SATURATION: 93 % | RESPIRATION RATE: 16 BRPM | WEIGHT: 198 LBS | SYSTOLIC BLOOD PRESSURE: 149 MMHG | HEART RATE: 101 BPM | BODY MASS INDEX: 29.33 KG/M2 | TEMPERATURE: 97.8 F | HEIGHT: 69 IN | DIASTOLIC BLOOD PRESSURE: 105 MMHG

## 2020-09-02 PROBLEM — I26.02 ACUTE SADDLE PULMONARY EMBOLISM WITH ACUTE COR PULMONALE (HCC): Status: ACTIVE | Noted: 2020-09-02

## 2020-09-02 PROBLEM — R93.89 NODULAR RADIOLOGIC DENSITY: Status: ACTIVE | Noted: 2020-09-02

## 2020-09-02 PROBLEM — Z78.9 ALCOHOL USE: Status: ACTIVE | Noted: 2020-09-02

## 2020-09-02 PROBLEM — F10.90 ALCOHOL USE: Status: ACTIVE | Noted: 2020-09-02

## 2020-09-02 PROBLEM — I26.99 PULMONARY EMBOLISM, BILATERAL (HCC): Status: ACTIVE | Noted: 2020-09-02

## 2020-09-02 PROBLEM — R73.9 HYPERGLYCEMIA: Status: ACTIVE | Noted: 2020-09-02

## 2020-09-02 PROBLEM — R65.10 SIRS (SYSTEMIC INFLAMMATORY RESPONSE SYNDROME) (HCC): Status: ACTIVE | Noted: 2020-09-02

## 2020-09-02 PROBLEM — R74.8 ABNORMAL LIVER ENZYMES: Status: ACTIVE | Noted: 2020-09-02

## 2020-09-02 PROBLEM — E87.20 METABOLIC ACIDOSIS: Status: ACTIVE | Noted: 2020-09-02

## 2020-09-02 PROBLEM — F17.200 TOBACCO DEPENDENCE: Status: ACTIVE | Noted: 2020-09-02

## 2020-09-02 LAB
ALBUMIN SERPL-MCNC: 4 G/DL (ref 3.5–5.2)
ALP BLD-CCNC: 71 U/L (ref 40–129)
ALT SERPL-CCNC: 97 U/L (ref 0–40)
ANION GAP SERPL CALCULATED.3IONS-SCNC: 11 MMOL/L (ref 7–16)
ANION GAP SERPL CALCULATED.3IONS-SCNC: 15 MMOL/L (ref 7–16)
APTT: 55.9 SEC (ref 24.5–35.1)
AST SERPL-CCNC: 77 U/L (ref 0–39)
BASOPHILS ABSOLUTE: 0.01 E9/L (ref 0–0.2)
BASOPHILS RELATIVE PERCENT: 0.1 % (ref 0–2)
BILIRUB SERPL-MCNC: 0.7 MG/DL (ref 0–1.2)
BUN BLDV-MCNC: 16 MG/DL (ref 8–23)
BUN BLDV-MCNC: 24 MG/DL (ref 8–23)
CALCIUM SERPL-MCNC: 9.1 MG/DL (ref 8.6–10.2)
CALCIUM SERPL-MCNC: 9.2 MG/DL (ref 8.6–10.2)
CHLORIDE BLD-SCNC: 105 MMOL/L (ref 98–107)
CHLORIDE BLD-SCNC: 109 MMOL/L (ref 98–107)
CO2: 18 MMOL/L (ref 22–29)
CO2: 20 MMOL/L (ref 22–29)
CREAT SERPL-MCNC: 0.9 MG/DL (ref 0.7–1.2)
CREAT SERPL-MCNC: 1.1 MG/DL (ref 0.7–1.2)
EKG ATRIAL RATE: 103 BPM
EKG P AXIS: 61 DEGREES
EKG P-R INTERVAL: 168 MS
EKG Q-T INTERVAL: 376 MS
EKG QRS DURATION: 94 MS
EKG QTC CALCULATION (BAZETT): 492 MS
EKG R AXIS: 80 DEGREES
EKG T AXIS: -26 DEGREES
EKG VENTRICULAR RATE: 103 BPM
EOSINOPHILS ABSOLUTE: 0.07 E9/L (ref 0.05–0.5)
EOSINOPHILS RELATIVE PERCENT: 0.9 % (ref 0–6)
FIBRINOGEN: 487 MG/DL (ref 225–540)
GFR AFRICAN AMERICAN: >60
GFR AFRICAN AMERICAN: >60
GFR NON-AFRICAN AMERICAN: >60 ML/MIN/1.73
GFR NON-AFRICAN AMERICAN: >60 ML/MIN/1.73
GLUCOSE BLD-MCNC: 178 MG/DL (ref 74–99)
GLUCOSE BLD-MCNC: 86 MG/DL (ref 74–99)
HCT VFR BLD CALC: 42.3 % (ref 37–54)
HCT VFR BLD CALC: 43.8 % (ref 37–54)
HEMOGLOBIN: 14.5 G/DL (ref 12.5–16.5)
HEMOGLOBIN: 14.9 G/DL (ref 12.5–16.5)
IMMATURE GRANULOCYTES #: 0.02 E9/L
IMMATURE GRANULOCYTES %: 0.3 % (ref 0–5)
LV EF: 55 %
LVEF MODALITY: NORMAL
LYMPHOCYTES ABSOLUTE: 0.93 E9/L (ref 1.5–4)
LYMPHOCYTES RELATIVE PERCENT: 12.1 % (ref 20–42)
MCH RBC QN AUTO: 32.9 PG (ref 26–35)
MCH RBC QN AUTO: 33.3 PG (ref 26–35)
MCHC RBC AUTO-ENTMCNC: 34 % (ref 32–34.5)
MCHC RBC AUTO-ENTMCNC: 34.3 % (ref 32–34.5)
MCV RBC AUTO: 95.9 FL (ref 80–99.9)
MCV RBC AUTO: 97.8 FL (ref 80–99.9)
MONOCYTES ABSOLUTE: 0.17 E9/L (ref 0.1–0.95)
MONOCYTES RELATIVE PERCENT: 2.2 % (ref 2–12)
NEUTROPHILS ABSOLUTE: 6.47 E9/L (ref 1.8–7.3)
NEUTROPHILS RELATIVE PERCENT: 84.4 % (ref 43–80)
PDW BLD-RTO: 13.5 FL (ref 11.5–15)
PDW BLD-RTO: 13.6 FL (ref 11.5–15)
PLATELET # BLD: 146 E9/L (ref 130–450)
PLATELET # BLD: 151 E9/L (ref 130–450)
PMV BLD AUTO: 10.4 FL (ref 7–12)
PMV BLD AUTO: 10.7 FL (ref 7–12)
POTASSIUM REFLEX MAGNESIUM: 4.2 MMOL/L (ref 3.5–5)
POTASSIUM SERPL-SCNC: 4 MMOL/L (ref 3.5–5)
PRO-BNP: 1138 PG/ML (ref 0–125)
RBC # BLD: 4.41 E12/L (ref 3.8–5.8)
RBC # BLD: 4.48 E12/L (ref 3.8–5.8)
SARS-COV-2, NAAT: NOT DETECTED
SODIUM BLD-SCNC: 138 MMOL/L (ref 132–146)
SODIUM BLD-SCNC: 140 MMOL/L (ref 132–146)
TOTAL PROTEIN: 7 G/DL (ref 6.4–8.3)
TROPONIN: <0.01 NG/ML (ref 0–0.03)
WBC # BLD: 7.1 E9/L (ref 4.5–11.5)
WBC # BLD: 7.7 E9/L (ref 4.5–11.5)

## 2020-09-02 PROCEDURE — 84484 ASSAY OF TROPONIN QUANT: CPT

## 2020-09-02 PROCEDURE — C1769 GUIDE WIRE: HCPCS

## 2020-09-02 PROCEDURE — 37211 THROMBOLYTIC ART THERAPY: CPT | Performed by: SURGERY

## 2020-09-02 PROCEDURE — 85384 FIBRINOGEN ACTIVITY: CPT

## 2020-09-02 PROCEDURE — 84145 PROCALCITONIN (PCT): CPT

## 2020-09-02 PROCEDURE — 85025 COMPLETE CBC W/AUTO DIFF WBC: CPT

## 2020-09-02 PROCEDURE — C1894 INTRO/SHEATH, NON-LASER: HCPCS

## 2020-09-02 PROCEDURE — 6360000002 HC RX W HCPCS: Performed by: STUDENT IN AN ORGANIZED HEALTH CARE EDUCATION/TRAINING PROGRAM

## 2020-09-02 PROCEDURE — 2580000003 HC RX 258

## 2020-09-02 PROCEDURE — C1751 CATH, INF, PER/CENT/MIDLINE: HCPCS

## 2020-09-02 PROCEDURE — 80053 COMPREHEN METABOLIC PANEL: CPT

## 2020-09-02 PROCEDURE — U0002 COVID-19 LAB TEST NON-CDC: HCPCS

## 2020-09-02 PROCEDURE — 80048 BASIC METABOLIC PNL TOTAL CA: CPT

## 2020-09-02 PROCEDURE — 6360000002 HC RX W HCPCS

## 2020-09-02 PROCEDURE — 2709999900 HC NON-CHARGEABLE SUPPLY

## 2020-09-02 PROCEDURE — 99284 EMERGENCY DEPT VISIT MOD MDM: CPT

## 2020-09-02 PROCEDURE — 2500000003 HC RX 250 WO HCPCS

## 2020-09-02 PROCEDURE — 99291 CRITICAL CARE FIRST HOUR: CPT | Performed by: SURGERY

## 2020-09-02 PROCEDURE — 3E05317 INTRODUCTION OF OTHER THROMBOLYTIC INTO PERIPHERAL ARTERY, PERCUTANEOUS APPROACH: ICD-10-PCS | Performed by: SURGERY

## 2020-09-02 PROCEDURE — 93308 TTE F-UP OR LMTD: CPT

## 2020-09-02 PROCEDURE — 71275 CT ANGIOGRAPHY CHEST: CPT

## 2020-09-02 PROCEDURE — 6360000004 HC RX CONTRAST MEDICATION: Performed by: RADIOLOGY

## 2020-09-02 PROCEDURE — 94002 VENT MGMT INPAT INIT DAY: CPT

## 2020-09-02 PROCEDURE — 83880 ASSAY OF NATRIURETIC PEPTIDE: CPT

## 2020-09-02 PROCEDURE — 99285 EMERGENCY DEPT VISIT HI MDM: CPT

## 2020-09-02 PROCEDURE — 85027 COMPLETE CBC AUTOMATED: CPT

## 2020-09-02 PROCEDURE — 36415 COLL VENOUS BLD VENIPUNCTURE: CPT

## 2020-09-02 PROCEDURE — C1887 CATHETER, GUIDING: HCPCS

## 2020-09-02 PROCEDURE — 36015 PLACE CATHETER IN ARTERY: CPT | Performed by: SURGERY

## 2020-09-02 PROCEDURE — 6360000002 HC RX W HCPCS: Performed by: SURGERY

## 2020-09-02 PROCEDURE — 2000000000 HC ICU R&B

## 2020-09-02 PROCEDURE — 85730 THROMBOPLASTIN TIME PARTIAL: CPT

## 2020-09-02 PROCEDURE — 96372 THER/PROPH/DIAG INJ SC/IM: CPT

## 2020-09-02 PROCEDURE — 2580000003 HC RX 258: Performed by: RADIOLOGY

## 2020-09-02 PROCEDURE — 75743 ARTERY X-RAYS LUNGS: CPT | Performed by: SURGERY

## 2020-09-02 PROCEDURE — 2580000003 HC RX 258: Performed by: SURGERY

## 2020-09-02 PROCEDURE — 93005 ELECTROCARDIOGRAM TRACING: CPT | Performed by: STUDENT IN AN ORGANIZED HEALTH CARE EDUCATION/TRAINING PROGRAM

## 2020-09-02 PROCEDURE — 93010 ELECTROCARDIOGRAM REPORT: CPT | Performed by: INTERNAL MEDICINE

## 2020-09-02 RX ORDER — ACETAMINOPHEN 325 MG/1
650 TABLET ORAL ONCE
Status: DISCONTINUED | OUTPATIENT
Start: 2020-09-02 | End: 2020-09-02 | Stop reason: HOSPADM

## 2020-09-02 RX ORDER — SODIUM CHLORIDE 0.9 % (FLUSH) 0.9 %
10 SYRINGE (ML) INJECTION PRN
Status: DISCONTINUED | OUTPATIENT
Start: 2020-09-02 | End: 2020-09-05 | Stop reason: HOSPADM

## 2020-09-02 RX ORDER — SODIUM CHLORIDE 0.9 % (FLUSH) 0.9 %
10 SYRINGE (ML) INJECTION ONCE
Status: COMPLETED | OUTPATIENT
Start: 2020-09-02 | End: 2020-09-02

## 2020-09-02 RX ORDER — ONDANSETRON 2 MG/ML
4 INJECTION INTRAMUSCULAR; INTRAVENOUS EVERY 6 HOURS PRN
Status: DISCONTINUED | OUTPATIENT
Start: 2020-09-02 | End: 2020-09-05 | Stop reason: HOSPADM

## 2020-09-02 RX ORDER — SODIUM CHLORIDE 9 MG/ML
INJECTION, SOLUTION INTRAVENOUS CONTINUOUS PRN
Status: ACTIVE | OUTPATIENT
Start: 2020-09-02 | End: 2020-09-03

## 2020-09-02 RX ORDER — ACETAMINOPHEN 325 MG/1
650 TABLET ORAL EVERY 4 HOURS PRN
Status: DISCONTINUED | OUTPATIENT
Start: 2020-09-02 | End: 2020-09-05 | Stop reason: HOSPADM

## 2020-09-02 RX ORDER — SODIUM CHLORIDE 9 MG/ML
INJECTION, SOLUTION INTRAVENOUS CONTINUOUS
Status: DISCONTINUED | OUTPATIENT
Start: 2020-09-02 | End: 2020-09-03

## 2020-09-02 RX ORDER — SODIUM CHLORIDE 0.9 % (FLUSH) 0.9 %
10 SYRINGE (ML) INJECTION EVERY 12 HOURS SCHEDULED
Status: DISCONTINUED | OUTPATIENT
Start: 2020-09-02 | End: 2020-09-05 | Stop reason: HOSPADM

## 2020-09-02 RX ORDER — HEPARIN SODIUM 10000 [USP'U]/100ML
500 INJECTION, SOLUTION INTRAVENOUS CONTINUOUS
Status: DISCONTINUED | OUTPATIENT
Start: 2020-09-02 | End: 2020-09-03

## 2020-09-02 RX ADMIN — HEPARIN SODIUM AND DEXTROSE 5 ML/HR: 10000; 5 INJECTION INTRAVENOUS at 18:15

## 2020-09-02 RX ADMIN — ALTEPLASE 1 MG/HR: 2.2 INJECTION, POWDER, LYOPHILIZED, FOR SOLUTION INTRAVENOUS at 18:15

## 2020-09-02 RX ADMIN — Medication 2 G: at 17:27

## 2020-09-02 RX ADMIN — Medication 10 ML: at 10:43

## 2020-09-02 RX ADMIN — SODIUM CHLORIDE 35 ML/HR: 9 INJECTION, SOLUTION INTRAVENOUS at 18:15

## 2020-09-02 RX ADMIN — ENOXAPARIN SODIUM 90 MG: 100 INJECTION SUBCUTANEOUS at 11:44

## 2020-09-02 RX ADMIN — IOPAMIDOL 70 ML: 755 INJECTION, SOLUTION INTRAVENOUS at 10:42

## 2020-09-02 ASSESSMENT — ENCOUNTER SYMPTOMS
EYE PAIN: 0
SINUS PRESSURE: 0
EYE DISCHARGE: 0
SORE THROAT: 0
STRIDOR: 0
BACK PAIN: 0
EYE REDNESS: 0
VOMITING: 0
ABDOMINAL PAIN: 0
WHEEZING: 0
SHORTNESS OF BREATH: 1
NAUSEA: 0
COUGH: 0
CHOKING: 0
DIARRHEA: 0

## 2020-09-02 ASSESSMENT — PULMONARY FUNCTION TESTS: PIF_VALUE: 14

## 2020-09-02 NOTE — CONSULTS
Vascular Surgery Consultation Note    Reason for Consult:  Submassive pe    HPI : This is a 77 y.o. male admitted on 9/2/2020  2:12 PM with sob. He first started having increased sob 8/29/20. Vascular surgery is consulted in regards to submassive pe. He was tx from [place] to [facility] for further treatment and evaluation. I had discussed case with Dr. Aleksander Truong - pulmonology and Dr. Newby Courser cardiology who read the echo. ROS : All others Negative if blank [], Positive if [x]  General Urinary   [] Fevers [] Hematuria   [] Chills [] Dysuria   [] Weight Loss Vascular   Skin [] Claudication   [] Tissue Loss [] Rest Pain   Eyes Neurologic   [x] Wears Glasses/Contacts [] Stroke/TIA   [] Vision Changes [] Focal weakness   Respiratory [] Slurred Speech    [x] Shortness of breath ENT   Cardiovascular [] Difficulty swallowing   [] Chest Pain Endocrine    [x] Shortness of breath with exertion [] Increased Thirst   Gastrointestinal    [] Abdominal Pain    [] Melena   [] Hematochezia         Past Medical History:   Diagnosis Date    Anxiety 6/5/2019    [facility] obstructive [facility]     Essential hypertension 12/5/2017    Mixed hyperlipidemia 6/5/2019    SOB (shortness of breath)     Ulcerative colitis ([facility] 6/5/2019      Past Surgical History:   Procedure Laterality Date    COLONOSCOPY      DENTAL SURGERY      extraction    PULMONARY EMBOLISM LYSIS  09/02/2020    Dr Mary Nickerson     Current Medications:    alteplase (ACTIVASE) 10 mg in 0.9% sodium chloride infusion 100 mL      And    alteplase (ACTIVASE) 10 mg in 0.9% sodium chloride infusion 100 mL      heparin (porcine)      And    heparin (porcine)      sodium chloride      And    sodium chloride            ceFAZolin (ANCEF) IVPB  2 g Intravenous See Admin Instructions      Allergies:  Patient has no known allergies.   Social History     Socioeconomic History    Marital status: Single     Spouse name: Not on file    Number of children: Not on file    Years of education: Not on file    Highest education level: Not on file   Occupational History    Not on file   Social Needs    Financial resource strain: Not on file    Food insecurity     Worry: Not on file     Inability: Not on file    Transportation needs     Medical: Not on file     Non-medical: Not on file   Tobacco Use    Smoking status: Light Tobacco Smoker     Types: Cigars    Smokeless tobacco: Never Used    Tobacco comment: one a day   Substance and Sexual Activity    Alcohol use: Yes     Alcohol/week: 8.0 standard drinks     Types: 8 Cans of beer per week     Frequency: 4 or more times a week     Drinks per session: 1 or 2     Binge frequency: Never     Comment: coiple a day sometimes     Drug use: Never    Sexual activity: Not Currently   Lifestyle    Physical activity     Days per week: Not on file     Minutes per session: Not on file    Stress: Not on file   Relationships    Social connections     Talks on phone: Not on file     Gets together: Not on file     Attends Scientology service: Not on file     Active member of club or organization: Not on file     Attends meetings of clubs or organizations: Not on file     Relationship status: Not on file    Intimate partner violence     Fear of current or ex partner: Not on file     Emotionally abused: Not on file     Physically abused: Not on file     Forced sexual activity: Not on file   Other Topics Concern    Not on file   Social History Narrative    Not on file     Family History   Problem Relation Age of Onset    COPD Mother      PHYSICAL EXAM:    There were no vitals taken for this visit.   CONSTITUTIONAL:   Awake, alert, cooperative  PSYCHIATRIC :  Oriented to time, place and person      Appropriate insight to disease process  EYES: Lids and lashes normal  ENT:  External ears and nose without lesions   Hearing deficits notnoted  NECK: Supple, symmetrical, trachea midline   Thyroid goiter not appreciated  LUNGS:  Increased work of breathing                 Good respiratory excursion  CARDIOVASCULAR:  regular rate and rhythm   ABDOMEN:  soft, non-distended, non-tender  SKIN:   Normal skin color   Texture and turgor normal, no induration  EXTREMITIES:   R UE 5/5 strength   No cyanosis noted in nail beds  L UE 5/5 strength   No cyanosis noted in nail beds  R LE Edema absent   Open wounds absent   L LE Edema slight in calf   Open wound absent  R femoral 2+ L femoral 2+   R posterior tibial 2+ L posterior tibial 2+   R dorsalis pedis 2+ L dorsalis pedis 2+     LABS:    Lab Results   Component Value Date    WBC 7.7 09/02/2020    HGB 14.9 09/02/2020    HCT 43.8 09/02/2020     09/02/2020    PROTIME 12.7 (H) 08/10/2020    INR 1.1 08/10/2020    K 4.2 09/02/2020    BUN 24 (H) 09/02/2020    CREATININE 1.1 09/02/2020     No results found for: LABA1C  No results found for: EAG  Lab Results   Component Value Date    LABALBU 4.0 09/02/2020        Radiology pertinent to vascular surgery evaluation reviewed    Assesment/Plan  Submassive Pulmonary Embolism  · RV to LV ratio is > 1  · RV is severely dilated on echocardiogram  · RV has severely reduced systolic function  · Pro BNP is 1138  · Troponin is 0.01  · Pt is  tachycardic - HR currently 105  · Pt is not hypotensive - SBP currently 150  · I feel that patient sage  a good candidate for placement of pulmonary embolism TPA lysis catheters   · Etiology is likely unknown  · discussed with patient pathophysiology of DVT, PE   · All ?s answered    I discussed with patient and family members at bedside options of  1. No intervention - Full dose anticoagulation  2. IV TPA    3. Catheter directed lysis of pulmonary embolus with TPA    · I discussed with associated consultants and it is felt that catheter directed lysis of pulmonary embolus with TPA is  the best option  · they understood risk of bleeding associated with TPA - brain hemorrhagic , GI bleeding  · Pt and family explained risks, benefits, complications, procedure, alternatives, options, and expected outcomes and was agreeable to proceed with lysis catheter placement    Shante Velasquez    Total critical care time caring for this patient with life threatening, unstable organ failure, including direct patient contact, management of life support systems, review of data including imaging and labs, discussions with other team members and physicians at least 35 minutes so far today, excluding procedures.

## 2020-09-02 NOTE — ED PROVIDER NOTES
Chief Complaint   Patient presents with    Shortness of Breath     been going on for couple months        Render Homans is a 50-year-old male with a medical history of hypertension who presents to the department today for acute shortness of breath and hypoxia. Per EMS patient was found at home complaining of shortness of breath with a O2 saturation of 85% on room air. Patient presents to us he is able to speak in full sentences, however is hypoxic. Patient arrived to the department on 6 L nasal cannula with O2 saturations of 92%. Patient states that he has been more short of breath for the past 1 to 2 months, he has been evaluated by cardiology Dr. Mic Estrella and Dr. Zuleyma Kaur will have performed an echo and cardiac cath, which per patient have been relatively normal.  Patient does not wear oxygen at home. Patient states he has borderline COPD, he does not wear oxygen at home, he does not have inhalers. Patient states symptoms have continued to get worse which is what led him to call EMS this morning. Patient denies having any chest pain, he has no previous cardiac history. Patient denies recent fevers, chills, chest pain, nausea, vomiting, diarrhea. Patient denies recent sick contacts that he is aware of. The history is provided by the patient. No  was used. Review of Systems   Constitutional: Negative for activity change, chills and fever. HENT: Negative for ear pain, sinus pressure and sore throat. Eyes: Negative for pain, discharge and redness. Respiratory: Positive for shortness of breath. Negative for cough, choking, wheezing and stridor. Cardiovascular: Negative for chest pain, palpitations and leg swelling. Gastrointestinal: Negative for abdominal pain, diarrhea, nausea and vomiting. Genitourinary: Negative for dysuria and frequency. Musculoskeletal: Negative for arthralgias and back pain. Skin: Negative for rash and wound.    Neurological: Negative for dizziness, syncope, weakness and headaches. Hematological: Negative for adenopathy. Psychiatric/Behavioral: Negative for behavioral problems and confusion. All other systems reviewed and are negative. Physical Exam  Vitals signs and nursing note reviewed. Constitutional:       General: He is not in acute distress. Appearance: Normal appearance. He is well-developed and normal weight. He is not ill-appearing. Comments: Patient speaking in full sentences  Does not appear to be in acute distress   HENT:      Head: Normocephalic and atraumatic. Mouth/Throat:      Mouth: Mucous membranes are moist.   Eyes:      Pupils: Pupils are equal, round, and reactive to light. Neck:      Musculoskeletal: Normal range of motion and neck supple. No neck rigidity or muscular tenderness. Cardiovascular:      Rate and Rhythm: Regular rhythm. Tachycardia present. Pulses: Normal pulses. Heart sounds: Normal heart sounds. No murmur. Pulmonary:      Effort: Pulmonary effort is normal. No respiratory distress. Breath sounds: Normal breath sounds. No wheezing or rales. Comments: Lungs are clear bilaterally  Abdominal:      General: Bowel sounds are normal.      Palpations: Abdomen is soft. Tenderness: There is no abdominal tenderness. There is no right CVA tenderness, left CVA tenderness, guarding or rebound. Musculoskeletal:      Right lower leg: No edema. Left lower leg: No edema. Skin:     General: Skin is warm and dry. Capillary Refill: Capillary refill takes less than 2 seconds. Findings: No rash. Neurological:      General: No focal deficit present. Mental Status: He is alert and oriented to person, place, and time. Cranial Nerves: No cranial nerve deficit.       Coordination: Coordination normal.   Psychiatric:         Mood and Affect: Mood normal.         Behavior: Behavior normal.          Procedures     Labs Reviewed   CBC WITH AUTO DIFFERENTIAL - Abnormal; Notable for the following components:       Result Value    Neutrophils % 84.4 (*)     Lymphocytes % 12.1 (*)     Lymphocytes Absolute 0.93 (*)     All other components within normal limits   COMPREHENSIVE METABOLIC PANEL W/ REFLEX TO MG FOR LOW K - Abnormal; Notable for the following components:    Chloride 109 (*)     CO2 20 (*)     Glucose 178 (*)     BUN 24 (*)     ALT 97 (*)     AST 77 (*)     All other components within normal limits   BRAIN NATRIURETIC PEPTIDE - Abnormal; Notable for the following components:    Pro-BNP 1,138 (*)     All other components within normal limits   TROPONIN   COVID-19     CTA PULMONARY W CONTRAST   Final Result      1. Prominent bilateral pulmonary emboli with evidence of right heart   strain (dilated right ventricular chamber, with straightening of the   intraventricular septum). 2. 3.6 x 3.4 cm left lower lobe nodular pulmonary opacities centrally   hyperdense and peripherally groundglass. As the opacity is not   pleural-based, the appearance is not typical for an infarction. It may   be infectious, inflammatory or neoplastic in etiology. Follow-up CT of   the chest is recommended in 3 months. 3. No lymphadenopathy. 4. 7 mm nonobstructive left renal calculus. EKG #1:  Interpreted by myself  Time:  0933    Rate: 103  Rhythm: Sinus. Interpretation: Sinus tachycardia, T wave inversions in lead V3, lead II, lead III, these are unchanged from previous tracings on 8/10/20. No ST elevation. WV interval 168, QRS 94, QTc 492      MDM  Number of Diagnoses or Management Options  Abnormal echocardiogram:   Bilateral pulmonary embolism (HCC):   Elevated brain natriuretic peptide (BNP) level:   Diagnosis management comments: Is a 72-year-old male presents today for shortness of breath, hypoxia tachycardia. On presentation patient is hypoxic with O2 sats of 84% on room air, is tachycardic. Initial concern for COVID, versus PE.   Physical exam shows heart lungs are patient in detail with the resident, and provided the instruction and education regarding PE. My findings/Plan: 55-year-old male who presents for worsening shortness of breath over the past couple months he had a recent normal heart cath and echocardiogram by Dr. Maggy Nixon and Dr. Ld Terry. He reports he had a PFT that showed mild COPD with scheduled to see Dr. Yo Regalado but has not seen him yet. He has no history of blood clot denies any recent surgery or immobilization besides the heart cath. Fortune patient is found to have large bilateral PEs with evidence of strain on CT he will be admitted and started on Lovenox    I ordered telemetry monitor, and did review the monitor during the patient's stay. []   80 Dr Damaris Garcia in the department to see patient. []   [address]    []   46 Dr. Damaris Garcia called, he discussed patient case with Dr. Jeane Almaraz and they want patient transferred downtown for thrombolysis    []   0 Dr. Jack Gama - patient has severe heart strain, with a pulmonary pressure greater than 100. []   26 Spoke with patient, he is resting comfortably in bed, I have discussed lab work and imaging with the patient, and our recommendation to be transferred downtown to be seen by vascular surgery. He understands and agrees with this plan. Patient vitals are stable. He is in no acute distress at this time    []   1527 I spoke with Dr. Aamir Almodovar who will admit the patient to CVICU    []      ED Course User Index  [] Demetrius Cortez DO  [] Yuki Rocha DO       --------------------------------------------- PAST HISTORY ---------------------------------------------  Past Medical History:  has a past medical history of Anxiety, COPD (chronic obstructive pulmonary disease) ([facility] Essential hypertension, Mixed hyperlipidemia, SOB (shortness of breath), and Ulcerative colitis ([facility]     Past Surgical History:  has a past surgical history that includes Dental surgery and no change  Repeat physical examination is not changed    I have spoken with the patient and discussed todays results, in addition to providing specific details for the plan of care and counseling regarding the diagnosis and prognosis. Their questions are answered at this time and they are agreeable with the plan. I have discussed the risks and benefits of transfer and they wish to proceed with the transfer. --------------------------------- ADDITIONAL PROVIDER NOTES ---------------------------------  Consultations:  Spoke with Dr. Toby Chung (Pulm). Discussed case. They will provide consultation. Spoke with Dr. Jack Ruiz (Cardio). Discussed case. They will provide consultation  Spoke with Dr. Lopez Kelly (Medicine). Discussed case. They will admit this patient. Reason for transfer: Vascular surgery intervention. This patient's ED course included: a personal history and physicial examination, re-evaluation prior to disposition, multiple bedside re-evaluations, IV medications, cardiac monitoring, continuous pulse oximetry and complex medical decision making and emergency management    This patient has remained hemodynamically stable during their ED course. Please note that the withdrawal or failure to initiate urgent interventions for this patient would likely result in a life threatening deterioration or permanent disability. Accordingly this patient received 32 minutes of critical care time, excluding separately billable procedures. Clinical Impression  1. Bilateral pulmonary embolism ([address]    2. Abnormal echocardiogram    3. Elevated brain natriuretic peptide (BNP) level          Disposition  Patient's disposition: Transfer to [address] [place] [address]   Transferred by: mobile ICU. Patient's condition is good.           Francisco Sawant DO  Resident  09/02/20 0040

## 2020-09-02 NOTE — PROGRESS NOTES
Called the [address] @ [phone] for update on return call. Spoke with Rudy Sargent, re page at [phone], paging Dr. Douglas Graham now. Dr. Radha Villagomez spoke with Dr. Douglas Graham @ [phone].

## 2020-09-02 NOTE — CONSULTS
Pulmonary Consultation STAT ER    Admit Date: 9/2/2020  Requesting Physician: Jose Lynch DO    Reason for consultation:  · Pulmonary embolism  HPI:  · The patient is a 43-year-old white male who presented to the emergency room with a sudden onset of significant shortness of breath this morning. He notes that he could not even walk across the room. Over the past several days, and even months, the patient's had intermittent shortness of breath. This past Saturday, he had some difficulties but by Sunday that had resolved. He denies any swelling of his lower extremities and notes that her recent cardiology work-up was negative. · Seen and evaluated in the emergency room, CT-a evidenced large bilateral pulmonary emboli as imaged below. There is also a pleural-based infiltrate, the etiology of which is unclear. · In the emergency room, the patient had arrived hypoxic from home and was placed on supplemental oxygen in transport. His blood pressure has never been an issue nor has his heart rate. The CTA also suggested the presence of right heart strain yet to be confirmed by echocardiogram.  Note that a previous echocardiogram showed no such right ventricular problem. · The patient has a history of chronic obstructive pulmonary disease and was scheduled to be seen in our practice in the near future. He also has a history of ulcerative colitis. PMH:    Past Medical History:   Diagnosis Date    Anxiety 6/5/2019    COPD (chronic obstructive pulmonary disease) ([address]     Essential hypertension 12/5/2017    Mixed hyperlipidemia 6/5/2019    SOB (shortness of breath)     Ulcerative colitis ([address] 6/5/2019     PSH:   Past Surgical History:   Procedure Laterality Date    COLONOSCOPY      DENTAL SURGERY      extraction       Review of Systems:   · Constitutional: As noted in the HPI. Denies fever or chills. · Eyes: No visual changes or diplopia. No scleral icterus.   · ENT: No headaches, hearing loss or vertigo. No nasal congestion, or sore throat. · Cardiovascular: See above  · Respiratory: See above. · Gastrointestinal: No abdominal pain, nausea or emesis. No diarrhea or rectal bleeding or melena. No change in bowel habits. · Genitourinary: No dysuria, urinary frequency, or incontinence. No hematuria. · Musculoskeletal: No gait disturbance, weakness or joint complaints. · Integumentary: No rash or pruritis. No abnormal pigmentation, hair or nail changes. · Neurological: No headache, diplopia, dizziness, tremor, change in muscle strength, numbness or tingling. No change in gait, balance, coordination, mood, affect, memory, mentation, behavior. · Psychiatric: No anxiety or depression. · Endocrine: No temperature intolerance, excessive thirst, fluid intake, urinary frequency, excessive appetite, or recent weight change. · Hematologic/Lymphatic: No abnormal bruising or bleeding, blood clots or swollen lymph nodes. No anemia, fever, chills, night sweats, or swollen glands. · Allergic/Immunologic: No seasonal or perenial allergies. No history of hives or atopic dermatitis. Social History:  · Alcohol: No  · Tobacco:   Ongoing  · Employment:  no silica or asbestos exposure  · Family:  No family history of lung disease    Medications:     acetaminophen  650 mg Oral Once       Vitals:  Tmax:  VITALS:  BP (!) 153/101   Pulse 103   Temp 97.8 °F (36.6 °C)   Resp 16   Ht 5' 9\" (1.753 m)   Wt 198 lb (89.8 kg)   SpO2 94%   BMI 29.24 kg/m²   24HR INTAKE/OUTPUT:  No intake or output data in the 24 hours ending 20 1242  CURRENT PULSE OXIMETRY:  SpO2: 94 %  24HR PULSE OXIMETRY RANGE:  SpO2  Av.6 %  Min: 92 %  Max: 94 %    EXAM:  General: No distress. Alert. Eyes: PERRL. No sclera icterus. No conjunctival injection. ENT: No discharge. Pharynx clear. Neck: Trachea midline. Normal thyroid. No jvd, no hjr. Resp: No wheezing. No accessory muscle use. No rales.   No rhonchi. CV: Regular rate. Regular rhythm. No murmur No rub. Abd: Non-tender. Non-distended. No masses. No organmegaly. Normal bowel sounds. Skin: Warm and dry. No nodule on exposed extremities. No rash on exposed extremities. Lymph: No cervical LAD. No supraclavicular LAD. Ext: No joint deformity. No clubbing. No cyanosis. No edema. Homans sign is negative bilaterally  Neuro: Awake. Follows commands. Positive pupils/gag/corneals. Normal pain response. Lab Results:  CBC:   Recent Labs     09/02/20  0940   WBC 7.7   HGB 14.9   HCT 43.8   MCV 97.8          BMP:  Recent Labs     09/02/20  0940      K 4.2   *   CO2 20*   BUN 24*   CREATININE 1.1    ALB:3,BILIDIR:3,BILITOT:3,ALKPHOS:3)@    PT/INR: No results for input(s): PROTIME, INR in the last 72 hours. Cultures:  Sputum: not available  Blood: not available    ABG:   No results for input(s): PH, PO2, PCO2, HCO3, BE, O2SAT, METHB, O2HB, COHB, O2CON, HHB, THB in the last 72 hours. Films:     CTA PULMONARY W CONTRAST   Final Result      1. Prominent bilateral pulmonary emboli with evidence of right heart   strain (dilated right ventricular chamber, with straightening of the   intraventricular septum). 2. 3.6 x 3.4 cm left lower lobe nodular pulmonary opacities centrally   hyperdense and peripherally groundglass. As the opacity is not   pleural-based, the appearance is not typical for an infarction. It may   be infectious, inflammatory or neoplastic in etiology. Follow-up CT of   the chest is recommended in 3 months. 3. No lymphadenopathy. 4. 7 mm nonobstructive left renal calculus. .        Assessment:  1. Submassive pulmonary embolism with a mildly elevated BNP, normal troponin and blood pressure with adequate oxygenation with supplemental oxygen in place. 2. Ulcerative colitis: Last colonoscopy in 2019 with inflammation and no evidence of a mass. This would cause relative thrombophilia.   3. History of elevated

## 2020-09-02 NOTE — ED NOTES
Bed: 04  Expected date:   Expected time:   Means of arrival:   Comments:  Zonia Vasquez RN  09/02/20 6005

## 2020-09-02 NOTE — ED NOTES
@5909 notified the access center to transfer the patient to  [facility]     Chrissyscott Haider  09/02/20 7686

## 2020-09-02 NOTE — ED NOTES
@1610 CALLED [facility]BI[facility] TO COME AND GET THE PATIENT, THEN 1620 CALLED THE ACCESS CENTER TO LET THEM KNOW I CALLED [facility], AND PATIENT HAD TO GO TO THE CATH LAB     Brooke Brush  09/02/20 6363

## 2020-09-02 NOTE — OP NOTE
Cardiovascular Lab Procedure Report    Surgeon: Chuck Guerra M.D. Pre-procedure Diagnosis: Massive Pulmonary Embolus, Right ventricular dilatation  Post-procedure Diagnosis: Same   Procedure:     Right Femoral vein access under US guidance x 2 and introduction of catheter into Right common iliac vein   10400 Selective right pulmonary artery catheter placement in branch , angiogram   25201 Selective left pulmonary artery catheter placement in branch, angiogram   11526, 05753 Placement of 2 EKOS Lysis catheter and US wire (12 cm infusion length) in bilateral pulmonary artery       Anesthesia: Local with IV sedation  Assistants: Cath Lab Staff  Estimated Blood Loss: Minimal  Complications: none  Findings[de-identified] Extensive clot noted in pulmonary arteries bilaterally    Procedure Details :  Patient had CTA preformed to dx PE. Based on this information, echo findings, clinical judgment, lab findings, and physical exam decision was made to proceed with PE lysis. Timeout preformed identifying pt and procedure. Right groin region prepped and draped in sterile fashion. Patient given sedation as needed throughout the case. Right femoral vein was noted to be patent and was accessed x 2 under ultrasound guidance after infiltrating with local. A printer was not available to print out an image. Cook needle, than 6 fr sheath placed. Advantage glide wire and sim 1 catheter advanced into right common iliac vein, IVC and than right heart. The right main pulmonary artery and than right upper pulmonary artery branch was accessed using the 0.35 advantage glide wire and the sim 1 catheter. Than the EKOS catheter was placed and a right pulmonary arteriogram was done to confirm placement. The US wire was than placed after removing the advantage glide wire. The left main pulmonary artery and than left lower pulmonary artery branch was accessed using the 0.35 advantage glide wire and the sim 1 catheter.  Than the EKOS catheter was placed and a left pulmonary arteriogram was done to confirm placement. The US wire was than placed after removing the advantage glide wire. The TPA at 1 mg./hr to drug port, saline at 35 ml/hr to the coolant port, and 500 units/hr of heparin to the sheath.      Plan  Remove catheters in 12 hours  Observe in ICU    Shante Godinez

## 2020-09-02 NOTE — ED NOTES
Pt off oxygen NC upon arrival, oxygen saturation 86% on room air. Pt placed on 3L then 6L NC before oxygen rising to 95%.      Arnaud Antony RN  09/02/20 [address], RN  09/02/20 4667

## 2020-09-03 ENCOUNTER — APPOINTMENT (OUTPATIENT)
Dept: ULTRASOUND IMAGING | Age: 70
DRG: 175 | End: 2020-09-03
Attending: INTERNAL MEDICINE
Payer: MEDICARE

## 2020-09-03 PROBLEM — I27.81 COR PULMONALE (CHRONIC) (HCC): Status: ACTIVE | Noted: 2020-09-02

## 2020-09-03 LAB
ALBUMIN SERPL-MCNC: 3.7 G/DL (ref 3.5–5.2)
ALP BLD-CCNC: 58 U/L (ref 40–129)
ALT SERPL-CCNC: 64 U/L (ref 0–40)
ANION GAP SERPL CALCULATED.3IONS-SCNC: 15 MMOL/L (ref 7–16)
APTT: 50.1 SEC (ref 24.5–35.1)
APTT: 55.5 SEC (ref 24.5–35.1)
AST SERPL-CCNC: 37 U/L (ref 0–39)
BILIRUB SERPL-MCNC: 1 MG/DL (ref 0–1.2)
BILIRUBIN DIRECT: 0.2 MG/DL (ref 0–0.3)
BILIRUBIN, INDIRECT: 0.8 MG/DL (ref 0–1)
BUN BLDV-MCNC: 16 MG/DL (ref 8–23)
C-REACTIVE PROTEIN: 1 MG/DL (ref 0–0.4)
CALCIUM SERPL-MCNC: 8.7 MG/DL (ref 8.6–10.2)
CHLORIDE BLD-SCNC: 106 MMOL/L (ref 98–107)
CO2: 19 MMOL/L (ref 22–29)
CREAT SERPL-MCNC: 0.9 MG/DL (ref 0.7–1.2)
FIBRINOGEN: 486 MG/DL (ref 225–540)
FIBRINOGEN: 508 MG/DL (ref 225–540)
GFR AFRICAN AMERICAN: >60
GFR NON-AFRICAN AMERICAN: >60 ML/MIN/1.73
GLUCOSE BLD-MCNC: 89 MG/DL (ref 74–99)
HBA1C MFR BLD: 6 % (ref 4–5.6)
HCT VFR BLD CALC: 41.5 % (ref 37–54)
HCT VFR BLD CALC: 41.7 % (ref 37–54)
HEMOGLOBIN: 14 G/DL (ref 12.5–16.5)
HEMOGLOBIN: 14.1 G/DL (ref 12.5–16.5)
MCH RBC QN AUTO: 32.3 PG (ref 26–35)
MCH RBC QN AUTO: 32.6 PG (ref 26–35)
MCHC RBC AUTO-ENTMCNC: 33.6 % (ref 32–34.5)
MCHC RBC AUTO-ENTMCNC: 34 % (ref 32–34.5)
MCV RBC AUTO: 96.1 FL (ref 80–99.9)
MCV RBC AUTO: 96.3 FL (ref 80–99.9)
PDW BLD-RTO: 13.4 FL (ref 11.5–15)
PDW BLD-RTO: 13.5 FL (ref 11.5–15)
PLATELET # BLD: 143 E9/L (ref 130–450)
PLATELET # BLD: 143 E9/L (ref 130–450)
PMV BLD AUTO: 10 FL (ref 7–12)
PMV BLD AUTO: 10.3 FL (ref 7–12)
POTASSIUM SERPL-SCNC: 3.9 MMOL/L (ref 3.5–5)
PRO-BNP: 2713 PG/ML (ref 0–125)
PROCALCITONIN: 0.08 NG/ML (ref 0–0.08)
PROCALCITONIN: 0.08 NG/ML (ref 0–0.08)
PROSTATE SPECIFIC ANTIGEN: 6.2 NG/ML (ref 0–4)
RBC # BLD: 4.32 E12/L (ref 3.8–5.8)
RBC # BLD: 4.33 E12/L (ref 3.8–5.8)
SEDIMENTATION RATE, ERYTHROCYTE: 10 MM/HR (ref 0–15)
SODIUM BLD-SCNC: 140 MMOL/L (ref 132–146)
TOTAL PROTEIN: 6.6 G/DL (ref 6.4–8.3)
TROPONIN: 0.07 NG/ML (ref 0–0.03)
TROPONIN: 0.08 NG/ML (ref 0–0.03)
WBC # BLD: 5.9 E9/L (ref 4.5–11.5)
WBC # BLD: 6.8 E9/L (ref 4.5–11.5)

## 2020-09-03 PROCEDURE — 83880 ASSAY OF NATRIURETIC PEPTIDE: CPT

## 2020-09-03 PROCEDURE — 6360000002 HC RX W HCPCS: Performed by: SURGERY

## 2020-09-03 PROCEDURE — 80076 HEPATIC FUNCTION PANEL: CPT

## 2020-09-03 PROCEDURE — 81241 F5 GENE: CPT

## 2020-09-03 PROCEDURE — 86140 C-REACTIVE PROTEIN: CPT

## 2020-09-03 PROCEDURE — 85730 THROMBOPLASTIN TIME PARTIAL: CPT

## 2020-09-03 PROCEDURE — 2000000000 HC ICU R&B

## 2020-09-03 PROCEDURE — 85651 RBC SED RATE NONAUTOMATED: CPT

## 2020-09-03 PROCEDURE — 81240 F2 GENE: CPT

## 2020-09-03 PROCEDURE — 85613 RUSSELL VIPER VENOM DILUTED: CPT

## 2020-09-03 PROCEDURE — 99233 SBSQ HOSP IP/OBS HIGH 50: CPT | Performed by: INTERNAL MEDICINE

## 2020-09-03 PROCEDURE — 81291 MTHFR GENE: CPT

## 2020-09-03 PROCEDURE — 2580000003 HC RX 258: Performed by: SURGERY

## 2020-09-03 PROCEDURE — 80048 BASIC METABOLIC PNL TOTAL CA: CPT

## 2020-09-03 PROCEDURE — 6370000000 HC RX 637 (ALT 250 FOR IP): Performed by: NURSE PRACTITIONER

## 2020-09-03 PROCEDURE — 84484 ASSAY OF TROPONIN QUANT: CPT

## 2020-09-03 PROCEDURE — 86147 CARDIOLIPIN ANTIBODY EA IG: CPT

## 2020-09-03 PROCEDURE — 85384 FIBRINOGEN ACTIVITY: CPT

## 2020-09-03 PROCEDURE — 84153 ASSAY OF PSA TOTAL: CPT

## 2020-09-03 PROCEDURE — 83036 HEMOGLOBIN GLYCOSYLATED A1C: CPT

## 2020-09-03 PROCEDURE — 85027 COMPLETE CBC AUTOMATED: CPT

## 2020-09-03 PROCEDURE — 83516 IMMUNOASSAY NONANTIBODY: CPT

## 2020-09-03 PROCEDURE — 84145 PROCALCITONIN (PCT): CPT

## 2020-09-03 PROCEDURE — 6370000000 HC RX 637 (ALT 250 FOR IP): Performed by: FAMILY MEDICINE

## 2020-09-03 PROCEDURE — 81400 MOPATH PROCEDURE LEVEL 1: CPT

## 2020-09-03 PROCEDURE — 6370000000 HC RX 637 (ALT 250 FOR IP): Performed by: SURGERY

## 2020-09-03 PROCEDURE — 99233 SBSQ HOSP IP/OBS HIGH 50: CPT | Performed by: SURGERY

## 2020-09-03 PROCEDURE — 93970 EXTREMITY STUDY: CPT

## 2020-09-03 PROCEDURE — 36415 COLL VENOUS BLD VENIPUNCTURE: CPT

## 2020-09-03 RX ORDER — MESALAMINE 400 MG/1
400 CAPSULE, DELAYED RELEASE ORAL 3 TIMES DAILY
Status: DISCONTINUED | OUTPATIENT
Start: 2020-09-03 | End: 2020-09-05 | Stop reason: HOSPADM

## 2020-09-03 RX ORDER — SERTRALINE HYDROCHLORIDE 100 MG/1
100 TABLET, FILM COATED ORAL DAILY
Status: DISCONTINUED | OUTPATIENT
Start: 2020-09-03 | End: 2020-09-05 | Stop reason: HOSPADM

## 2020-09-03 RX ORDER — HYDRALAZINE HYDROCHLORIDE 20 MG/ML
10 INJECTION INTRAMUSCULAR; INTRAVENOUS EVERY 6 HOURS PRN
Status: DISCONTINUED | OUTPATIENT
Start: 2020-09-03 | End: 2020-09-05 | Stop reason: HOSPADM

## 2020-09-03 RX ORDER — LISINOPRIL 10 MG/1
10 TABLET ORAL DAILY
Status: DISCONTINUED | OUTPATIENT
Start: 2020-09-03 | End: 2020-09-04

## 2020-09-03 RX ORDER — MERCAPTOPURINE 50 MG/1
50 TABLET ORAL 2 TIMES DAILY
Status: DISCONTINUED | OUTPATIENT
Start: 2020-09-03 | End: 2020-09-05 | Stop reason: HOSPADM

## 2020-09-03 RX ORDER — METOPROLOL SUCCINATE 25 MG/1
25 TABLET, EXTENDED RELEASE ORAL DAILY
Status: DISCONTINUED | OUTPATIENT
Start: 2020-09-03 | End: 2020-09-05

## 2020-09-03 RX ORDER — TAMSULOSIN HYDROCHLORIDE 0.4 MG/1
0.4 CAPSULE ORAL DAILY
Status: DISCONTINUED | OUTPATIENT
Start: 2020-09-03 | End: 2020-09-05 | Stop reason: HOSPADM

## 2020-09-03 RX ORDER — ATORVASTATIN CALCIUM 10 MG/1
10 TABLET, FILM COATED ORAL DAILY
Status: DISCONTINUED | OUTPATIENT
Start: 2020-09-03 | End: 2020-09-05 | Stop reason: HOSPADM

## 2020-09-03 RX ADMIN — APIXABAN 10 MG: 5 TABLET, FILM COATED ORAL at 20:28

## 2020-09-03 RX ADMIN — MESALAMINE 400 MG: 400 CAPSULE, DELAYED RELEASE ORAL at 08:38

## 2020-09-03 RX ADMIN — ACETAMINOPHEN 650 MG: 325 TABLET, FILM COATED ORAL at 01:48

## 2020-09-03 RX ADMIN — MESALAMINE 400 MG: 400 CAPSULE, DELAYED RELEASE ORAL at 20:28

## 2020-09-03 RX ADMIN — ATORVASTATIN CALCIUM 10 MG: 10 TABLET, FILM COATED ORAL at 08:40

## 2020-09-03 RX ADMIN — MESALAMINE 400 MG: 400 CAPSULE, DELAYED RELEASE ORAL at 15:02

## 2020-09-03 RX ADMIN — TAMSULOSIN HYDROCHLORIDE 0.4 MG: 0.4 CAPSULE ORAL at 08:39

## 2020-09-03 RX ADMIN — LISINOPRIL 10 MG: 10 TABLET ORAL at 08:39

## 2020-09-03 RX ADMIN — SERTRALINE HYDROCHLORIDE 100 MG: 100 TABLET ORAL at 08:40

## 2020-09-03 RX ADMIN — METOPROLOL SUCCINATE 25 MG: 25 TABLET, EXTENDED RELEASE ORAL at 08:38

## 2020-09-03 RX ADMIN — APIXABAN 10 MG: 5 TABLET, FILM COATED ORAL at 09:51

## 2020-09-03 RX ADMIN — ALTEPLASE 1 MG/HR: 2.2 INJECTION, POWDER, LYOPHILIZED, FOR SOLUTION INTRAVENOUS at 02:45

## 2020-09-03 RX ADMIN — Medication 2 G: at 01:41

## 2020-09-03 RX ADMIN — SODIUM CHLORIDE, PRESERVATIVE FREE 10 ML: 5 INJECTION INTRAVENOUS at 20:29

## 2020-09-03 RX ADMIN — ALTEPLASE 1 MG/HR: 2.2 INJECTION, POWDER, LYOPHILIZED, FOR SOLUTION INTRAVENOUS at 01:41

## 2020-09-03 RX ADMIN — SODIUM CHLORIDE, PRESERVATIVE FREE 10 ML: 5 INJECTION INTRAVENOUS at 08:40

## 2020-09-03 ASSESSMENT — PAIN DESCRIPTION - DESCRIPTORS: DESCRIPTORS: ACHING;DULL;DISCOMFORT

## 2020-09-03 ASSESSMENT — PAIN SCALES - GENERAL
PAINLEVEL_OUTOF10: 0
PAINLEVEL_OUTOF10: 3
PAINLEVEL_OUTOF10: 0
PAINLEVEL_OUTOF10: 0

## 2020-09-03 ASSESSMENT — PAIN DESCRIPTION - PAIN TYPE: TYPE: ACUTE PAIN

## 2020-09-03 ASSESSMENT — PAIN DESCRIPTION - PROGRESSION: CLINICAL_PROGRESSION: GRADUALLY WORSENING

## 2020-09-03 ASSESSMENT — PAIN - FUNCTIONAL ASSESSMENT: PAIN_FUNCTIONAL_ASSESSMENT: ACTIVITIES ARE NOT PREVENTED

## 2020-09-03 ASSESSMENT — PAIN DESCRIPTION - FREQUENCY: FREQUENCY: INTERMITTENT

## 2020-09-03 ASSESSMENT — PAIN DESCRIPTION - LOCATION: LOCATION: BACK

## 2020-09-03 ASSESSMENT — PAIN DESCRIPTION - ORIENTATION: ORIENTATION: LOWER

## 2020-09-03 ASSESSMENT — PAIN DESCRIPTION - ONSET: ONSET: GRADUAL

## 2020-09-03 NOTE — PROGRESS NOTES
[facility]  [address] [facility], [address], [address] [facility]      Pulmonary Progress Note      SUBJECTIVE:  Lying down in bed, on RA, claims doing much better with breathing with not much issues with cough/congestion. Had thrombolysis with EKOS yesterday with Vascular, started on Eliquis today.     OBJECTIVE    Medications    Continuous Infusions:   sodium chloride         Scheduled Meds:   atorvastatin  10 mg Oral Daily    mesalamine  400 mg Oral TID    lisinopril  10 mg Oral Daily    mercaptopurine  50 mg Oral BID    metoprolol succinate  25 mg Oral Daily    sertraline  100 mg Oral Daily    tamsulosin  0.4 mg Oral Daily    apixaban  10 mg Oral BID    Followed by   Demian Soria ON 9/10/2020] apixaban  5 mg Oral BID    sodium chloride flush  10 mL Intravenous 2 times per day       PRN Meds:hydrALAZINE, sodium chloride flush, acetaminophen, ondansetron, sodium chloride    Physical    VITALS:  BP (!) 158/88   Pulse 66   Temp 98.6 °F (37 °C) (Oral)   Resp 19   Ht 5' 9\" (1.753 m)   Wt 208 lb (94.3 kg)   SpO2 94%   BMI 30.72 kg/m²     24HR INTAKE/OUTPUT:      Intake/Output Summary (Last 24 hours) at 9/3/2020 1520  Last data filed at 9/3/2020 1100  Gross per 24 hour   Intake 1305 ml   Output 525 ml   Net 780 ml       24HR PULSE OXIMETRY RANGE:    SpO2  Av.1 %  Min: 93 %  Max: 97 %    General appearance: alert, appears stated age and cooperative  Lungs: clear to auscultation bilaterally  Heart: regular rate and rhythm, S1, S2 normal, no murmur, click, rub or gallop  Abdomen: soft, non-tender; bowel sounds normal; no masses,  no organomegaly  Extremities: extremities normal, atraumatic, no cyanosis or edema  Neurologic: Mental status: Alert, oriented, thought content appropriate    Data    CBC:   Recent Labs     20  2130 20  0130 20  0705   WBC 7.1 6.8 5.9   HGB 14.5 14.1 14.0   HCT 42.3 41.5 41.7   MCV 95.9 96.1 96.3   PLT 151 143 143       BMP:  Recent Labs     09/02/20  0940 09/02/20  2130 09/03/20  0705    138 140   K 4.2 4.0 3.9   * 105 106   CO2 20* 18* 19*   BUN 24* 16 16   CREATININE 1.1 0.9 0.9    ALB:3,BILIDIR:3,BILITOT:3,ALKPHOS:3)@    PT/INR: No results for input(s): PROTIME, INR in the last 72 hours. ABG:   No results for input(s): PH, PO2, PCO2, HCO3, BE, O2SAT, METHB, O2HB, COHB, O2CON, HHB, THB in the last 72 hours. FiO2 : 50 %  I:E Ratio: 1:3.30    Radiology/Other tests reviewed: CTA chest reviewed with bilateral extensive PE, (+) opacity/mass/infarction at left lower lobe    Assessment:     Principal Problem:    Acute saddle pulmonary embolism with acute cor pulmonale (HCC)  Active Problems:    Uncontrolled hypertension    Ulcerative colitis (HCC)    Anxiety    Mixed hyperlipidemia    Elevated PSA    Benign prostatic hyperplasia with urinary frequency    Shortness of breath    COPD (chronic obstructive pulmonary disease) (HCC)    Nodular radiologic density    Abnormal liver enzymes    Hyperglycemia    Metabolic acidosis    Tobacco dependence    Alcohol use    SIRS (systemic inflammatory response syndrome) (HCC)    Cor pulmonale (chronic) (HCC)  Resolved Problems:    * No resolved hospital problems. *      Plan:       1. Cont with anticoagulation as tolerated, will most probably keep for life  2. On RA and no lung medications, observe  3. (?) lung mass or infarction, may need bronchoscopy to evaluate, can be done as out-pt if so, alternatively can repeat CT chest in 2-3 weeks to see if still need bronchoscopy or not  4. OOB to chair      Time at the bedside, reviewing labs and radiographs, reviewing notes and consultations, discussing with staff and family was more than 35 minutes. Thanks for letting us see this patient in consultation. Please contact us with any questions. Office [phone] or after hours through [employer], x [phone].

## 2020-09-03 NOTE — PROGRESS NOTES
Hospitalist Progress Note  [facility]       Patient:  Ahr  Unit/Bed: 5781/8502-O  YOB: 1950  MRN: [id number]  Acct: [de-identified]   AdmittingDiagnosis: Pulmonary embolism, bilateral ([facility] [I26.99]  Admit Date: 9/2/2020  Hospital Day: 1    Subjective:    Patient is having problems with Worsening shortness of breath and finding of saddle pulmonary embolus    Patient Seen, Chart, Labs, Radiology studies, and Consults reviewed. Objective:   BP (!) 160/85   Pulse 60   Temp 97.6 °F (36.4 °C) (Temporal)   Resp 17   Wt 208 lb 9.6 oz (94.6 kg)   SpO2 96%   BMI 30.80 kg/m²       Intake/Output Summary (Last 24 hours) at 9/3/2020 4450  Last data filed at 9/3/2020 0983  Gross per 24 hour   Intake 1305 ml   Output 125 ml   Net 1180 ml       Physical Exam  General appearance: Elderly male, no apparent distress, appears stated age and cooperative. Normal habitus BMI 30.8. HEENT:  Normal cephalic, atraumatic without obvious deformity. Pupils equal, round, and reactive to light. Extra ocular muscles intact. Conjunctivae/corneas clear. Neck: Supple, with full range of motion. No jugular venous distention. Trachea midline. Respiratory:  Normal respiratory effort. Clear to auscultation, bilaterally without Rales/Wheezes/Rhonchi. Cardiovascular:  Regular rate and rhythm with normal R5/J0 with systolic murmur,no rubs or gallops. Abdomen: Soft, non-tender, non-distended with normal bowel sounds. Musculoskeletal:  No clubbing, cyanosis or edema bilaterally. Limited range of motion without deformity. Circumference of the calves both left and right poor similar in diameter at mid calf; no tenderness  Skin: Skin color, texture, turgor normal.  No rashes or lesions. Neurologic:  Neurovascularly intact without any focal sensory/motor deficits.  Cranial nerves: II-XII intact, grossly non-focal.  Psychiatric:  Alert and oriented, thought content appropriate, normal insight  Capillary Refill: Brisk,< 3 seconds   Peripheral Pulses: +2 palpable, equal bilaterally        Mitchell:No  Drains:No  Central Line/port: Yes Ekos line removed   EKG:Yes [reviewed by me] shows sinus tachycardia rate of 103 with nonspecific ST-T wave changes that are not changed from prior, suggestive of anterior and inferior ischemia. Imaging:Yes   Cta Pulmonary W Contrast Date: 2020  Patient MRN:  [id number] : 1950 Age: 77 years Gender: Male Order Date:  2020 10:27 AM EXAM: CTA PULMONARY W CONTRAST INDICATION:  hypoxia, tachycardia. r/o PE hypoxia, tachycardia. r/o PE COMPARISON: None Technique: Low-dose CT  acquisition technique included one of following options; 1 . Automated exposure control, 2. Adjustment of MA and or KV according to patient's size or 3. Use of iterative reconstruction. Contiguous spiral images were obtained in the axial plane, following the administration of intravenous contrast using CT angiographic protocol. Sagittal and coronal images were reconstructed from the axial plane acquisition. Addition MIP reconstructions were presented to aid in the interpretation of this study. FINDINGS: Prominent bilateral pulmonary emboli extending from the distal pulmonary arteries into the branches of all lobes. Probable pulmonary arterial hypertension, suggested by dilation of the main pulmonary artery (normal caliber less than 3 cm) to 3.2 cm. In the right ventricle is larger than the left and there is flattening of the interventricular septum indicating right heart strain. The heart is grossly normal in size. Mild calcified atherosclerosis is seen in the thoracic aorta which is tortuous but not aneurysmal. Within the left lower lobe, there is a 3.6 x 3.4 cm nodular opacity which is centrally dense and peripherally groundglass. Minimal dependent atelectasis seen in the right lower lobe. The lungs are otherwise clear. The central airway is clear. No pneumothorax or pleural effusion.  No lymphadenopathy seen in the chest. The upper abdomen included in the field-of-view of this study demonstrates no acute abnormality. 7 mm nonobstructive left renal calculus. Kidneys are only partially included in the field-of-view of this study. Small right renal cyst. Evaluation of the bones reveals no fracture or destructive lesion. 1. Prominent bilateral pulmonary emboli with evidence of right heart strain (dilated right ventricular chamber, with straightening of the intraventricular septum). 2. 3.6 x 3.4 cm left lower lobe nodular pulmonary opacities centrally hyperdense and peripherally groundglass. As the opacity is not pleural-based, the appearance is not typical for an infarction. It may be infectious, inflammatory or neoplastic in etiology. Follow-up CT of the chest is recommended in 3 months. 3. No lymphadenopathy. 4. 7 mm nonobstructive left renal calculus.       Diet:  Diet NPO Effective Now    Medications:    atorvastatin  10 mg Oral Daily    mesalamine  400 mg Oral TID    lisinopril  10 mg Oral Daily    mercaptopurine  50 mg Oral BID    metoprolol succinate  25 mg Oral Daily    sertraline  100 mg Oral Daily    tamsulosin  0.4 mg Oral Daily    ceFAZolin (ANCEF) IVPB  2 g Intravenous See Admin Instructions    sodium chloride flush  10 mL Intravenous 2 times per day    ceFAZolin (ANCEF) IVPB  2 g Intravenous Q8H     Continuous Infusions:   alteplase (ACTIVASE) 10 mg in 0.9% sodium chloride infusion 100 mL 1 mg/hr (09/03/20 0141)    And    alteplase (ACTIVASE) 10 mg in 0.9% sodium chloride infusion 100 mL 1 mg/hr (09/03/20 0245)    heparin (porcine) 5 mL/hr (09/02/20 1815)    And    heparin (porcine) 5 mL/hr (09/02/20 1815)    sodium chloride 35 mL/hr (09/02/20 1815)    And    sodium chloride 35 mL/hr (09/02/20 1815)    sodium chloride       PRNMeds:sodium chloride flush, acetaminophen, ondansetron, sodium chloride  DVT Prophylaxis:Therapeutic heparin infusion and Encourage ambulation, low risk for DVT, no chemical or mechanical prophylaxis necessary    Data:  CBC:  Recent Labs     09/02/20 2130 09/03/20 0130 09/03/20  0705   WBC 7.1 6.8 5.9   RBC 4.41 4.32 4.33   HGB 14.5 14.1 14.0   HCT 42.3 41.5 41.7   MCV 95.9 96.1 96.3   RDW 13.6 13.4 13.5    143 143     BMP:  Recent Labs     09/02/20 0940 09/02/20 2130 09/03/20  0705    138 140   K 4.2 4.0 3.9   * 105 106   CO2 20* 18* 19*   BUN 24* 16 16   CREATININE 1.1 0.9 0.9     BNP: No results for input(s): BNP in the last 72 hours. PT/INR: No results for input(s): PROTIME, INR in the last 72 hours.  :   Recent Labs     09/02/20 2130 09/03/20 0130 09/03/20  0705   APTT 55.9* 55.5* 50.1*     CARDIAC ENZYMES:No results for input(s): CKMB, CKMBINDEX, TROPONINT in the last 72 hours. Invalid input(s): CKTOTAL;3  FASTING LIPID PANEL:  Lab Results   Component Value Date    CHOL 160 06/17/2020    HDL 55 06/17/2020    TRIG 107 06/17/2020     LIVER PROFILE:   Recent Labs     09/02/20 0940 09/03/20  0705   AST 77* 37   ALT 97* 64*   BILIDIR  --  0.2   BILITOT 0.7 1.0   ALKPHOS 71 58      ABGs: No results found for: PH, PCO2, PO2, HCO3, O2SAT    Assessment/Plan:  Principal Problem:    Acute saddle pulmonary embolism with acute cor pulmonale (HCC)  Active Problems:    Uncontrolled hypertension    Hyperglycemia    SIRS (systemic inflammatory response syndrome) (HCC)    Cor pulmonale (chronic) (HCC)    Elevated PSA    Benign prostatic hyperplasia with urinary frequency    Shortness of breath    COPD (chronic obstructive pulmonary disease) (HCC)    Nodular radiologic density    Abnormal liver enzymes    Metabolic acidosis    Ulcerative colitis ([address]    Anxiety    Mixed hyperlipidemia    Tobacco dependence    Alcohol use  Resolved Problems:    * No resolved hospital problems. *    Summary: Mr. Destinee Sahu, a 77 y.o. male who presented to [facility] from [facility] with shortness of breath for at least 2 months.   This was on and off, moderate in intensity, worse with exertion, and associated with palpitations, dizziness and occasional pain around his calves. He was transferred to [facility] because of large bilateral pulmonary embolus with severe pulmonary hypertension and evidence of RV strain in addition to a left lower lobe nodule [3.6 x 3.4 cm nodular density]. He has had  thrombophlebitis in the 1980s. PMHx: hypertension, COPD, anxiety, hyperlipidemia, diverticulitis, elevated PSA level, alcohol and tobacco use.     Initial evaluation: /108, , respiratory 20 and labs: normal CBC, negative SARS-CoV-2, glucose 178, CO2 20, troponin 0 0.01, AST 77, ALT 97.    - Chest CTA showed large bilateral PEs, findings of possible pulmonary hypertension, evidence of RV strain, 3.6 x 3.4 cm nodular density in the left lower lobe with surrounding groundglass appearance peripherally suspicious for infectious versus inflammatory versus malignancy. 7 mm nonobstructing left kidney stone. - EKG shows sinus tachycardia rate of 103 with nonspecific ST-T wave changes that are not changed from prior.   - Echo showed EF of >55%, severely reduced RV function. Moderate TR, severe pulmonary hypertension and ascending aorta 4.2 cm. Patient was admitted [9/02/2020] and given intravascular TPA infusion via Ekos catheter. .# PE/core-Pulm/HTN: 2 L O2 sat 96%. Relation of pulmonary emboli and pulmonary nodule will need to be kept in mind. Cor pulmonale by CT and cardiac echo. Give intravascular TPA infusion via Ekos catheter overnight. Catheter was removed midmorning. Eliquis was started along with continuing lisinopril 10 mg/d, metoprolol XL 25 mg/d. .# PulmNodule/SIRS/COPD: WBC normal, procalcitonin 0.08 elevated. Presumptive minimal infection, On empiric cefazolin loading and 2g every 8h [ordered by ICU team], but ceftriaxone may be a better agent. Follow-up pulmonary density in the left lower lobe while on treatment. And DuoNeb  . # BPH/ePSA: Repeat PSA, continue Tamsulosin 0.4 mg/d   . # Anx/Tobc/ETOH: Continue to monitor and continue Sertraline 100 mg/d  . # HLD: Continue Atorvastatin 10 mg hs      CODE STATUS: Full      Time Spent: 45 minutes  signed by Patricia Meyers MD on 9/3/2020 at 8:07 [place] [address] Medicine Service

## 2020-09-03 NOTE — PLAN OF CARE
Problem: Bleeding:  Goal: Will show no signs and symptoms of excessive bleeding  Description: Will show no signs and symptoms of excessive bleeding  Outcome: Met This Shift     Problem: Gas Exchange - Impaired:  Goal: Levels of oxygenation will improve  Description: Levels of oxygenation will improve  Outcome: Met This Shift

## 2020-09-03 NOTE — PROGRESS NOTES
09/02/20 2028   Vent Information   $Ventilation $Initial Day   Skin Assessment Clean, dry, & intact   Equipment -9   Vent Type 980   Vent Mode AC/VC   Vt Ordered 400 mL   Rate Set 14 bmp   Peak Flow 0 L/min   Pressure Support 0 cmH20   FiO2  100 %   SpO2 96 %   SpO2/FiO2 ratio 96   Sensitivity 0   PEEP/CPAP 5   I Time/ I Time % 1 s   Vent Patient Data   High Peep/I Pressure 0   Peak Inspiratory Pressure 14 cmH2O   Mean Airway Pressure 8.5 cmH20   Rate Measured 15 br/min   Vt Exhaled 427 mL   Minute Volume 7.76 Liters   I:E Ratio 1:3.30   this was for 1432

## 2020-09-03 NOTE — CARE COORDINATION
Met with pt at bedside. He lives alone in a 1 story home, is independent in adl's and exercises regularly. Sat is 96% on ra at rest. Plan is to return home at discharge. He does not anticipate any needs. Provided with [facility] list if needed. Started on Eliquis today. Provided pt with 30 day free coupon as he is already on an expensive med for hx of ulcerative colitis. Informed pt to inquire with pharmacist re:copay. If unable to afford cost, notify dr to ask to have med changed to a more affordable one. Pt states his sister will likely provide transport home.

## 2020-09-03 NOTE — CONSULTS
Department of Medicine  Division of Hematology/Oncology  Attending Consult Note      Reason for Consult: PE of unknown etiology  Requesting Physician:  Dr. Raheem Wright:  SOB    History Obtained From:  Patient and EMR    HISTORY OF PRESENT ILLNESS:      The patient is a 77 y.o. male with significant past medical history of COPD, hypertension, hyperlipidemia, ulcerative colitis and anxiety who presents with shortness of breath and hypoxia. Patient was found to be 85% on room air. Patient reports he had been more short of breath for the past 1 to 2 months. Patient was seen by cardiology, a cardiac cath was done which was essentially normal.  CTA pulmonary showed prominent bilateral pulmonary emboli with evidence of right heart strain, patient had dilated right ventricle chamber, with straightening of the intra-ventricular septum. CT also showed 3.6 x 3.4 cm left lower lobe nodular pulmonary opacities centrally hyperdense and peripherally groundglass. Appearance is not typical for an infarction, it may be infectious. Patient was started on therapeutic Lovenox and incision to Eliquis. Echo showed EF of >55%, severely reduced RV function. Moderate TR, severe pulmonary hypertension and ascending aorta 4.2 cm. On 9/2/2020 patient underwent EKOS catheter for direct thrombolysis. Upon examination patient does tell me that he had phlebitis in a leg?, unknown which one after he was hospitalized for a week in the 1970s, says he was placed on Coumadin at that time, unknown for how long. He denies any hx of other blood clots in himself or his family. He reports his dad has a history of multiple myeloma. He denies any history of tobacco, illicit drug or alcohol use. He reports he has been SOB for 6 months now, he also notes last Wednesday he was able to go on brisk 3 mile walk and a 25 mile bike ride on Thursday. He denies any injury.      Past Medical History:        Diagnosis Date    Anxiety Transportation needs     Medical: Not on file     Non-medical: Not on file   Tobacco Use    Smoking status: Light Tobacco Smoker     Types: Cigars    Smokeless tobacco: Never Used    Tobacco comment: one a day   Substance and Sexual Activity    Alcohol use:  Yes     Alcohol/week: 8.0 standard drinks     Types: 8 Cans of beer per week     Frequency: 4 or more times a week     Drinks per session: 1 or 2     Binge frequency: Never     Comment: coiple a day sometimes     Drug use: Never    Sexual activity: Not Currently   Lifestyle    Physical activity     Days per week: Not on file     Minutes per session: Not on file    Stress: Not on file   Relationships    Social connections     Talks on phone: Not on file     Gets together: Not on file     Attends Restorationist service: Not on file     Active member of club or organization: Not on file     Attends meetings of clubs or organizations: Not on file     Relationship status: Not on file    Intimate partner violence     Fear of current or ex partner: Not on file     Emotionally abused: Not on file     Physically abused: Not on file     Forced sexual activity: Not on file   Other Topics Concern    Not on file   Social History Narrative    Not on file       Family History:         Problem Relation Age of Onset    COPD Mother        REVIEW OF SYSTEMS:    CONSTITUTIONAL:  negative for fatigue, fevers, chills, sweats and weight loss  HEENT:  negative for hearing loss, epistaxis and sore throat  RESPIRATORY:  negative for cough with sputum, dyspnea, wheezing, hemoptysis and chest pain  CARDIOVASCULAR:  negative for chest pain, palpitations, PND, edema, syncope, dyspnea  GASTROINTESTINAL:  negative for nausea, vomiting, diarrhea, constipation, abdominal pain, jaundice, reflux, hematemesis and hemtochezia  GENITOURINARY:  negative for frequency, dysuria and urinary incontinence  INTEGUMENT/BREAST:  negative for rash and pruritus  HEMATOLOGIC/LYMPHATIC:  negative for easy bruising, bleeding, lymphadenopathy and petechiae  MUSCULOSKELETAL:  negative for myalgias, arthralgias and pain  NEUROLOGICAL:  negative for headaches, dizziness, seizures, gait problems and syncope  BEHAVIOR/PSYCH:  negative for depressed mood and anxiety    PHYSICAL EXAM:      Vitals:  BP (!) 162/93   Pulse 63   Temp 98.4 °F (36.9 °C) (Temporal)   Resp 17   Wt 208 lb 9.6 oz (94.6 kg)   SpO2 94%   BMI 30.80 kg/m²     CONSTITUTIONAL:  awake, alert, cooperative, no apparent distress, and appears stated age  HEENT:  Normocepalic, atraumatic, no obvious abnormality. Lids and lashes normal, PERRLA, EOMI, sclera clear, conjunctiva normal  NECK:  Supple, full ROM, symmetrical, trachea midline, no adenopathy, thyroid symmetric, not enlarged and no tenderness, skin normal  HEMATOLOGIC/LYMPHATICS:  no cervical or supraclavicular lymphadenopathy  LUNGS:  No increased work of breathing, good air exchange, clear to auscultation bilaterally, no crackles or wheezing  CARDIOVASCULAR:  Normal apical impulse, regular rate and rhythm, normal S1 and S2, no S3 or S4, and no murmur noted  ABDOMEN:  No scars, normal BS, soft, non-distended, non-tender, no masses palpated, no hepatosplenomegaly  MUSCULOSKELETAL:  No redness, warmth, or swelling of the joints; motor strength is 5 out of 5 in all extremities bilaterally; tone is normal  NEUROLOGIC:  Awake, alert, oriented to name, place and time. Cranial nerves II-XII are grossly intact. Motor is 5 out of 5 bilaterally. Sensory is intact.       DATA:      CMP:    Lab Results   Component Value Date     2020    K 3.9 2020    K 4.2 2020     2020    CO2 19 2020    BUN 16 2020    PROT 6.6 2020       CBC:    Recent Labs     20  2130 20  0130 20  0705   WBC 7.1 6.8 5.9   HGB 14.5 14.1 14.0    143 143       Radiology:    Cta Pulmonary W Contrast  Result Date: 2020  Patient MRN:  [id number] : 1950 Age: chamber, with straightening of the intraventricular septum). 2. 3.6 x 3.4 cm left lower lobe nodular pulmonary opacities centrally hyperdense and peripherally groundglass. As the opacity is not pleural-based, the appearance is not typical for an infarction. It may be infectious, inflammatory or neoplastic in etiology. Follow-up CT of the chest is recommended in 3 months. 3. No lymphadenopathy. 4. 7 mm nonobstructive left renal calculus. IMPRESSION:   81-year-old male who presented with shortness of breath CTA showed massive PE with evidence of right heart strain. Patient is status post Catheter directed lysis of PE via EKOS on 8/2. We were subsequently consulted for unprovoked PE.      RECOMMENDATIONS:  -We will obtain hypercoagulable work-up. -Pulmonary was consulted for abnormal CT, will await their recommendations whether or not bronchoscopy is appropriate or not. -PSA is elevated at 6.2 today, was 5.1 in December 2019. Patient follows with Dr. Jed Pack for this, has hx of enlarged prostate. PSA being monitored  -Patient will be on indefinite anticoagulation given size of unprovoked PE.  -Case discussed with Dr. Juan Wooten   Electronically signed by GRACE Wilde NP on 9/3/2020 at 11:40 AM     Attending Addendum:      Patient seen and examined personally. All pertinent labs and imaging reviewed. Case discussed with NP Ms. Fernando Harrell. Agree with the consult note as above which has been updated to reflect my changes. Given unprovoked nature and large clot burden I will prefer indefinite anticoagulation. Hypercoagulable workup is needed as well, ordered. Thank you for allowing the [facility] [address] to participate in care of your patient.     Sarita Gorman, [address] for [employer]

## 2020-09-03 NOTE — PROGRESS NOTES
Vascular Surgery Progress Note    Pt is being seen in f/u today regarding PE  Subjective  Pt s/e. Feeling better. Denies current sob or chest pain.   Denies groin pain  Current Medications:    sodium chloride        hydrALAZINE, sodium chloride flush, acetaminophen, ondansetron, sodium chloride    atorvastatin  10 mg Oral Daily    mesalamine  400 mg Oral TID    lisinopril  10 mg Oral Daily    mercaptopurine  50 mg Oral BID    metoprolol succinate  25 mg Oral Daily    sertraline  100 mg Oral Daily    tamsulosin  0.4 mg Oral Daily    apixaban  10 mg Oral BID    Followed by   Maddy Rice ON 9/10/2020] apixaban  5 mg Oral BID    sodium chloride flush  10 mL Intravenous 2 times per day      PHYSICAL EXAM:    BP (!) 162/93   Pulse 63   Temp 98.4 °F (36.9 °C) (Temporal)   Resp 17   Wt 208 lb 9.6 oz (94.6 kg)   SpO2 94%   BMI 30.80 kg/m²     Intake/Output Summary (Last 24 hours) at 9/3/2020 1149  Last data filed at 9/3/2020 0746  Gross per 24 hour   Intake 1305 ml   Output 325 ml   Net 980 ml        Gen awake and alert  CVS S1S2  Resp good resp excursion  Abd soft nt nd  R LE Hematoma is not present    LABS:    Lab Results   Component Value Date    WBC 5.9 09/03/2020    HGB 14.0 09/03/2020    HCT 41.7 09/03/2020     09/03/2020    PROTIME 12.7 (H) 08/10/2020    INR 1.1 08/10/2020    APTT 50.1 (H) 09/03/2020    K 3.9 09/03/2020    BUN 16 09/03/2020    CREATININE 0.9 09/03/2020       A/P SubMassive PE with acute cor pulmonale s/p PE lysis with EKOS  Patient does not have pain at access point  Hemodynamics are improved  Resp status is significantly improved  Start Diet   UO adequate  Hgb stable, Pltlts stable, Fibirnogen stable  will plan on transfer to stepdown    Suspected LE DVT - us pending  · Etiology is likely unknown  · No indication for placement of IVC Filter  · Elevate Bilateral LE while in bed or sitting  · Tubigrip  Bilateral LE while in the hospital  · Compression stockings kneehigh 20-30 mm hg wear daily - Script will be left on chart  · F/U as outpatient in 1 months  · Call if patient develops worsening of swelling or wounds  · discussed with patient pathophysiology of DVT, PE, and thrombophlebitis   · All ?s answered    Anticoagulation Options Discussion  · Pt currently on tpa, heparin  · Patient was explained that all anticoagulants have a risk of significant bleeding  · They understood not to participate in high risk activities including but not limited to, riding motorcycle, use of ladders, going up on roofs, tree stands, contact sports  · They understood that any significant head trauma could be life threatening  · We discussed options, risks, benefits, potential negative outcomes  · Coumadin   · Heparin vs. lovenox bridge  · necessity of blood draws and potential dose adjustments with coumadin  · dietary restrictions  · Xarelto Daily Dosing but must eat a full meal  · Eliquis BID dosing, less risk of bleeding  · explained issues of no antidote with xeralto or Eliquis  · pt agreeable to using eliquis  · patient understands that if they are pregnant or become pregnant that these medications could cause significant problems to/for baby  · Monitor platelets for thrombocytopenia    Mariama Sesay

## 2020-09-03 NOTE — H&P
Hospital Medicine History & Physical      PCP: Taina Kang DO    Date of Admission: 9/2/2020    Date of Service: Pt seen/examined on 9/2/2020 and Admitted to Inpatient with expected LOS greater than two midnights due to medical therapy. Chief Complaint: Shortness of breath      History Of Present Illness:      77 y.o. male who presented to [facility] with past medical history of hypertension, COPD, anxiety, hyperlipidemia, diverticulitis, elevated PSA level, thrombophlebitis in the 80s, alcohol and tobacco use. Patient has been having shortness of breath for at least 2 months. This was on and off, moderate in intensity, worse with exertion, and associated with palpitations, dizziness and occasional pain around his calves. Shortness of breath got progressively worse today and was severe. He has been following up with PCP and cardiology outpatient because of his shortness of breath. He denies any chest pain. No cough or fever. No abdominal pain or change in bowel habits. No urinary complaints and no leg swelling. Vital signs notable for blood pressure 145/108, heart rate 108, respiratory 20, labs showed normal CBC, negative SARS-CoV-2 ,glucose 178, CO2 20, troponin 0 0.01, AST 77, ALT 97,. CTA of the chest showed large bilateral PEs, findings of possible pulmonary hypertension, evidence of RV strain, 3.6 x 3.4 cm nodular density in the left lower lobe with surrounding groundglass appearance peripherally suspicious for infectious versus inflammatory versus malignancy. 7 mm nonobstructing left kidney stone. EKG shows sinus tachycardia rate of 103 with nonspecific ST-T wave changes that are not changed from prior. Echo showed EF of >55%, severely reduced RV function. Moderate TR, severe pulmonary hypertension and ascending aorta 4.2 cm. He is being admitted for further management.       Past Medical History:          Diagnosis Date    Anxiety 6/5/2019    [facility]chronic obstructive [facility] disease) [facility]     Essential hypertension 12/5/2017    Mixed hyperlipidemia 6/5/2019    SOB (shortness of breath)     Ulcerative colitis ([facility] 6/5/2019       Past Surgical History:          Procedure Laterality Date    COLONOSCOPY      DENTAL SURGERY      extraction    PULMONARY EMBOLISM LYSIS  09/02/2020    Dr Pradeep Gonzales       Medications Prior to Admission:      Prior to Admission medications    Medication Sig Start Date End Date Taking? Authorizing Provider   metoprolol succinate (TOPROL XL) 25 MG extended release tablet Take 1 tablet by mouth daily 8/27/20   Leelee Pino MD   lisinopril (PRINIVIL;ZESTRIL) 10 MG tablet Take 1 tablet by mouth daily 8/27/20   Leelee Pino MD   atorvastatin (LIPITOR) 10 MG tablet Take 1 tablet by mouth daily 8/11/20   Mary Hogan DO   sertraline (ZOLOFT) 100 MG tablet Take 1 tablet by mouth daily 8/11/20   Mary Hogan DO   propranolol (INDERAL LA) 60 MG extended release capsule Take 1 capsule by mouth daily  Patient not taking: Reported on 8/10/2020 7/28/20   Mary Hogan DO   tamsulosin (FLOMAX) 0.4 MG capsule Take 0.4 mg by mouth daily    Historical Provider, MD   folic acid (FOLVITE) 1 MG tablet Take 1 tablet by mouth daily 12/11/19   Mary Hogan DO   LIALDA 1.2 g EC tablet Take 1 tablet by mouth 3 times daily 12/11/19   Mary Hogan DO   mercaptopurine (PURINETHOL) 50 MG chemo tablet Take 1 tablet by mouth 2 times daily 12/11/19   Mary Hogan DO       Allergies:  Patient has no known allergies. Social History:      The patient currently lives at home. TOBACCO:   reports that he has been smoking cigars. He has never used smokeless tobacco.  ETOH:   reports current alcohol use of about 8.0 standard drinks of alcohol per week. Family History:     Reviewed in detail Positive as follows:        Problem Relation Age of Onset    COPD Mother        REVIEW OF SYSTEMS:   Pertinent positives as noted in the HPI.  All other with acute cor pulmonale (HCC) [I26.02] 09/02/2020    Nodular radiologic density [R93.89] 09/02/2020    Abnormal liver enzymes [R74.8] 09/02/2020    Hyperglycemia [R73.9] 05/58/3878    Metabolic acidosis [P89.3] 09/02/2020    Tobacco dependence [F17.200] 09/02/2020    Alcohol use [Z72.89] 09/02/2020    SIRS (systemic inflammatory response syndrome) ([facility] [R65.10] 09/02/2020    COPD (chronic obstructive pulmonary disease) ([facility] [J44.9] 08/10/2020    Shortness of breath [R06.02] 06/17/2020    Elevated PSA [R97.20] 12/04/2019    Anxiety [F41.9] 06/05/2019    Mixed hyperlipidemia [E78.2] 06/05/2019    Ulcerative colitis ([facility] [K51.90] 06/05/2019    Uncontrolled hypertension [I10] 12/05/2017    Benign prostatic hyperplasia with urinary frequency [N40.1, R35.0] 11/16/2016   . RV dysfunction  . Severe pulmonary hypertension  . Moderate TR    PLAN:  1. Unprovoked acute massive PE with cor pulmonale s/p placement of EKOS catheter for direct thrombolysis. Patient needs worked up for occult malignancy. Recheck his PSA, C-scope and hypercoagulable work up can be planned out patient. Doppler ultrasound to evaluate for DVT. 2.  RV dysfunction secondary to above. 3.  Severe pulmonary hypertension secondary to above. 4.  Moderate TR  5. Abnormal liver enzymes possibly secondary to right heart failure/alcohol use. Monitor. 6.  Left lower lobe pulmonary density. ? Infectious versus malignancy. Unlikely infarct due to central nature. Consult pulmonology for consideration for bronchoscopy or biopsy prior to patient being established on anticoagulation. 7.  BPH with elevated PSA level, recheck. 8. SIRS likely due to PE. Source of infection unclear, ? Pulmonary given the left lung density. Currently on Ancef. Continue same. Await pulmonology input. Check procalcitonin. 9.  Hypertension, blood pressure is elevated, resume home medications.   10.  Hyperglycemia, not a known diabetic, check A1c.  11.

## 2020-09-03 NOTE — PROGRESS NOTES
ADVANCED CARE PLANNING    Patient Name: Tracy Balderrama       YOB: 1950              MRN:    [id number]  Admission Date:  9/2/2020  7:55 PM    Active Diagnoses: Active Problems:    Uncontrolled hypertension    Ulcerative colitis (HCC)    Anxiety    Mixed hyperlipidemia    Elevated PSA    Benign prostatic hyperplasia with urinary frequency    Shortness of breath    COPD (chronic obstructive pulmonary disease) (HCC)    Acute saddle pulmonary embolism with acute cor pulmonale (HCC)    Nodular radiologic density    Abnormal liver enzymes    Hyperglycemia    Metabolic acidosis    Tobacco dependence    Alcohol use    SIRS (systemic inflammatory response syndrome) ([facility]  Resolved Problems:    * No resolved hospital problems. *      These active diagnoses are of sufficient risk that focused discussion on advanced care planning is indicated in order to allow the patient to thoughtfully consider personal goals of care; and, if situations arise that prevent the patient to personally give input, to ensure appropriate representation of their personal desire for different levels and levels of care. Persons present in discussion: Frankodamian Balderrama, Elvia Cohn MD, Family members:none. Discussion: I reviewed his admission for saddle PE SP EKOs catheter placement for TPA and his desires for ongoing aggressive care, including potential intubation and mechanical ventilation; also discussed who would speak on his behalf should he be unable to do so and discussed what conversations he has had with his family so they understand his desires if such a situation occurred now or in the future. I presented and explained the availability of our palliative care team to him, including the availability of advanced directives forms which he can review with his family and then fill out if they so wish. Patient says he has a power of  documentation at home that designated his Sister Ms Casiano as [place].  WIth regards to his CODE status, he is not sure what that should be. We discussed that while he is yet to make up his mind it would be safer to have him as full code for now until he states otherwise. He expressed understanding. Time Spent on Advanced Planning Documents: 16 minutes    Electronically signed by Rich Gallardo MD on 9/2/2020 at 10:18 PM    NOTE: This report was transcribed using voice recognition software. Every effort was made to ensure accuracy; however, inadvertent computerized transcription errors may be present.

## 2020-09-03 NOTE — PROGRESS NOTES
command   Skin: Warm and dry  Incisions: Right groin insertion site with minimal strikethrough      Assessment/Plan: POD #1  Massive PE S/p Catheter directed lysis of PE via EKOS   - EKOS placed 8/2 ~6pm; catheters removed without difficulty at 0855. Pressure held for 10mins, hemostasis achieved. Clean dressing applied. Check site frequently. 3 hours bedrest. Bedside RN aware.    - increasing BNP 1100--> 2700; troponin down to 0.07 (peaked 0.08); monitor   - Hematology consulted for unknown etiology and hypercoagulable workup   - Start Eliquis 10mg BID for 7 days, then 5mg BID   - Advance diet when bedrest complete  - Tolerating Room air, monitor sats   - Pulmonology following       Dispo: Monitor in ICU post catheter removal, transfer to [facility] later today     Electronically signed by GRACE Rolle CNP on 9/3/2020 at 9:19 AM

## 2020-09-04 ENCOUNTER — TELEPHONE (OUTPATIENT)
Dept: VASCULAR SURGERY | Age: 70
End: 2020-09-04

## 2020-09-04 LAB
ANION GAP SERPL CALCULATED.3IONS-SCNC: 12 MMOL/L (ref 7–16)
BUN BLDV-MCNC: 13 MG/DL (ref 8–23)
CALCIUM SERPL-MCNC: 9.1 MG/DL (ref 8.6–10.2)
CHLORIDE BLD-SCNC: 104 MMOL/L (ref 98–107)
CO2: 23 MMOL/L (ref 22–29)
CREAT SERPL-MCNC: 0.9 MG/DL (ref 0.7–1.2)
GFR AFRICAN AMERICAN: >60
GFR NON-AFRICAN AMERICAN: >60 ML/MIN/1.73
GLUCOSE BLD-MCNC: 107 MG/DL (ref 74–99)
HCT VFR BLD CALC: 43.4 % (ref 37–54)
HEMOGLOBIN: 14.9 G/DL (ref 12.5–16.5)
HOMOCYSTEINE: 9.5 UMOL/L (ref 0–15)
LUPUS ANTICOAG DVVT: NEGATIVE
MCH RBC QN AUTO: 33 PG (ref 26–35)
MCHC RBC AUTO-ENTMCNC: 34.3 % (ref 32–34.5)
MCV RBC AUTO: 96 FL (ref 80–99.9)
PDW BLD-RTO: 13.6 FL (ref 11.5–15)
PLATELET # BLD: 174 E9/L (ref 130–450)
PMV BLD AUTO: 10.1 FL (ref 7–12)
POTASSIUM SERPL-SCNC: 4.1 MMOL/L (ref 3.5–5)
PRO-BNP: 564 PG/ML (ref 0–125)
PROSTATE SPECIFIC ANTIGEN: 5.54 NG/ML (ref 0–4)
RBC # BLD: 4.52 E12/L (ref 3.8–5.8)
REASON FOR REJECTION: NORMAL
REJECTED TEST: NORMAL
SODIUM BLD-SCNC: 139 MMOL/L (ref 132–146)
WBC # BLD: 5.1 E9/L (ref 4.5–11.5)

## 2020-09-04 PROCEDURE — 6370000000 HC RX 637 (ALT 250 FOR IP): Performed by: NURSE PRACTITIONER

## 2020-09-04 PROCEDURE — 83090 ASSAY OF HOMOCYSTEINE: CPT

## 2020-09-04 PROCEDURE — 6370000000 HC RX 637 (ALT 250 FOR IP): Performed by: INTERNAL MEDICINE

## 2020-09-04 PROCEDURE — 83880 ASSAY OF NATRIURETIC PEPTIDE: CPT

## 2020-09-04 PROCEDURE — 99233 SBSQ HOSP IP/OBS HIGH 50: CPT | Performed by: INTERNAL MEDICINE

## 2020-09-04 PROCEDURE — 6370000000 HC RX 637 (ALT 250 FOR IP): Performed by: FAMILY MEDICINE

## 2020-09-04 PROCEDURE — 36415 COLL VENOUS BLD VENIPUNCTURE: CPT

## 2020-09-04 PROCEDURE — 97165 OT EVAL LOW COMPLEX 30 MIN: CPT

## 2020-09-04 PROCEDURE — 80048 BASIC METABOLIC PNL TOTAL CA: CPT

## 2020-09-04 PROCEDURE — G0103 PSA SCREENING: HCPCS

## 2020-09-04 PROCEDURE — 2580000003 HC RX 258: Performed by: SURGERY

## 2020-09-04 PROCEDURE — 97161 PT EVAL LOW COMPLEX 20 MIN: CPT

## 2020-09-04 PROCEDURE — 99231 SBSQ HOSP IP/OBS SF/LOW 25: CPT | Performed by: SURGERY

## 2020-09-04 PROCEDURE — 2140000000 HC CCU INTERMEDIATE R&B

## 2020-09-04 PROCEDURE — 85027 COMPLETE CBC AUTOMATED: CPT

## 2020-09-04 RX ORDER — SPIRONOLACTONE 25 MG/1
25 TABLET ORAL DAILY
Status: DISCONTINUED | OUTPATIENT
Start: 2020-09-04 | End: 2020-09-05 | Stop reason: HOSPADM

## 2020-09-04 RX ORDER — LISINOPRIL 10 MG/1
10 TABLET ORAL 2 TIMES DAILY
Status: DISCONTINUED | OUTPATIENT
Start: 2020-09-04 | End: 2020-09-05

## 2020-09-04 RX ADMIN — APIXABAN 10 MG: 5 TABLET, FILM COATED ORAL at 08:44

## 2020-09-04 RX ADMIN — SODIUM CHLORIDE, PRESERVATIVE FREE 10 ML: 5 INJECTION INTRAVENOUS at 08:44

## 2020-09-04 RX ADMIN — SPIRONOLACTONE 25 MG: 25 TABLET ORAL at 12:20

## 2020-09-04 RX ADMIN — MESALAMINE 400 MG: 400 CAPSULE, DELAYED RELEASE ORAL at 20:28

## 2020-09-04 RX ADMIN — SERTRALINE HYDROCHLORIDE 100 MG: 100 TABLET ORAL at 08:45

## 2020-09-04 RX ADMIN — ATORVASTATIN CALCIUM 10 MG: 10 TABLET, FILM COATED ORAL at 08:45

## 2020-09-04 RX ADMIN — LISINOPRIL 10 MG: 10 TABLET ORAL at 20:30

## 2020-09-04 RX ADMIN — MESALAMINE 400 MG: 400 CAPSULE, DELAYED RELEASE ORAL at 08:45

## 2020-09-04 RX ADMIN — SODIUM CHLORIDE, PRESERVATIVE FREE 10 ML: 5 INJECTION INTRAVENOUS at 20:28

## 2020-09-04 RX ADMIN — TAMSULOSIN HYDROCHLORIDE 0.4 MG: 0.4 CAPSULE ORAL at 08:44

## 2020-09-04 RX ADMIN — METOPROLOL SUCCINATE 25 MG: 25 TABLET, EXTENDED RELEASE ORAL at 08:44

## 2020-09-04 RX ADMIN — LISINOPRIL 10 MG: 10 TABLET ORAL at 08:45

## 2020-09-04 RX ADMIN — MESALAMINE 400 MG: 400 CAPSULE, DELAYED RELEASE ORAL at 14:31

## 2020-09-04 RX ADMIN — APIXABAN 10 MG: 5 TABLET, FILM COATED ORAL at 20:28

## 2020-09-04 ASSESSMENT — PAIN SCALES - GENERAL
PAINLEVEL_OUTOF10: 0

## 2020-09-04 NOTE — PROGRESS NOTES
Vascular Surgery Progress Note    Pt is being seen in f/u today regarding PE  Subjective  Pt s/e. Feeling better. Denies current sob or chest pain.   Denies groin pain  Current Medications:      hydrALAZINE, sodium chloride flush, acetaminophen, ondansetron    atorvastatin  10 mg Oral Daily    mesalamine  400 mg Oral TID    lisinopril  10 mg Oral Daily    mercaptopurine  50 mg Oral BID    metoprolol succinate  25 mg Oral Daily    sertraline  100 mg Oral Daily    tamsulosin  0.4 mg Oral Daily    apixaban  10 mg Oral BID    Followed by   Wendy Campbell ON 9/10/2020] apixaban  5 mg Oral BID    sodium chloride flush  10 mL Intravenous 2 times per day      PHYSICAL EXAM:    /67   Pulse 59   Temp 97.8 °F (36.6 °C) (Oral)   Resp 22   Ht 5' 9\" (1.753 m)   Wt 205 lb (93 kg)   SpO2 96%   BMI 30.27 kg/m²     Intake/Output Summary (Last 24 hours) at 9/4/2020 0810  Last data filed at 9/4/2020 0400  Gross per 24 hour   Intake 240 ml   Output 575 ml   Net -335 ml        Gen awake and alert  CVS S1S2  Resp good resp excursion  Abd soft nt nd  R LE Hematoma is not present    LABS:    Lab Results   Component Value Date    WBC 5.9 09/03/2020    HGB 14.0 09/03/2020    HCT 41.7 09/03/2020     09/03/2020    PROTIME 12.7 (H) 08/10/2020    INR 1.1 08/10/2020    APTT 50.1 (H) 09/03/2020    K 3.9 09/03/2020    BUN 16 09/03/2020    CREATININE 0.9 09/03/2020       A/P SubMassive PE with acute cor pulmonale s/p PE lysis with EKOS  Patient does not have pain at access point  Hemodynamics are improved  Resp status is significantly improved  Tolerating diet  UO adequate  Labs pending    L LE DVT - thrombus within the left popliteal and peroneal veins  · Etiology is likely unknown  · Hematology evaluation  · No indication for placement of IVC Filter  · Elevate Bilateral LE while in bed or sitting  · Tubigrip  Bilateral LE while in the hospital  · Compression stockings kneehigh 20-30 mm hg wear daily - Script will be left on chart  · F/U as outpatient in 1 months  · Call if patient develops worsening of swelling or wounds  · discussed with patient pathophysiology of DVT, PE, and thrombophlebitis   · Continue eliquis  · All ?s answered    Ok for dc from vascular pov after ambulates with pt ot    Sobia Woo

## 2020-09-04 NOTE — TELEPHONE ENCOUNTER
----- Message from GRACE Kelley CNP sent at 9/4/2020 12:20 PM EDT -----  Please schedule Mr Colton Sanchez for followup with Dr Kayce Vaughan in 1 month   Thanks

## 2020-09-04 NOTE — PLAN OF CARE
Problem: Bleeding:  Goal: Will show no signs and symptoms of excessive bleeding  Description: Will show no signs and symptoms of excessive bleeding  9/4/2020 0947 by Shahid Monteiro RN  Outcome: Met This Shift  9/3/2020 2102 by Linda Lomeli RN  Outcome: Met This Shift     Problem: Gas Exchange - Impaired:  Goal: Levels of oxygenation will improve  Description: Levels of oxygenation will improve  9/4/2020 0947 by Shahid Monteiro RN  Outcome: Met This Shift  9/3/2020 2102 by Linda Lomeli RN  Outcome: Met This Shift

## 2020-09-04 NOTE — PROGRESS NOTES
OCCUPATIONAL THERAPY INITIAL EVALUATION      Date:2020  Patient Name: Nellie Everett  MRN: [id number]  : 1950  Room: [address] [place] [address]    Referring Provider: Harriett Betts MD    Evaluating OT: Kiran Turner OTR/L [id number]      AM-PAC Daily Activity Raw Score:     Recommended Adaptive Equipment: shower seat as needed for energy conservation. Diagnosis: B Pulmonary embolism     Surgery/Procedures:   EKOS lysis catheter     Pertinent Medical History:   Anxiety, COPD, HTN, PE, HLD, SOB, UC    Precautions:  Falls, monitor O2 saturation     Home Living: Pt lives alone  in a 1 floor plan with 2 step(s) to enter and 1 rail(s); bed/bath on first floor  Bathroom setup: walk in shower; standard height commode   Equipment owned: no DME    Prior Level of Function: IND with ADLs;  IND with IADLs. No device for ambulation. Driving: yes  Occupation: retired executive     Pain Level: pt c/o 0/10  pain  this session      Cognition: A&O: 4/4    Follows 1-3 step commands appropriately.     Memory: G   Comprehension G   Problem solving: G   Judgement/safety: G               Communication skills: WFL           Vision: WFL; reports no vision changes               Glasses:yes                                                   Hearing: WFL     RASS: 0  CAM-ICU: (NT) Delirium    UE Assessment:  Hand Dominance: Right [x]  Left []     ROM Strength   RUE  WFL WFL   LUE WFL WFL     Sensation: No c/o numbness or tingling in extremities   Tone: WNL   Edema: [facility]     Functional Assessment   Initial Eval Status  Date:    Feeding IND   Grooming IND   Standing sink level with no LOB with functional reaching; no signs of SOB   UB dressing/bathing IND  Standing EOB    LB dressing/bathing IND  Seated/standing  EOB   Toileting NT   Bed Mobility  Supine to sit: IND    Sit to supine: IND   Functional Transfers Sit to stand: IND    Stand to sit: IND   Functional Mobility IND  No device- to sink for grooming task   Balance Sitting: Static:  IND    Dynamic:IND  Standing: IND   Endurance/Activity Tolerance   G tolerance with moderate activity. Visual/  Perceptual   WFL                   Assessment of current deficits   Functional mobility []   ADLs [] Strength []   Cognition []  Functional transfers  [x]  IADLs [] Safety Awareness []   Endurance [x]  Fine Motor Coordination []  Balance [] Vision/perception []  Sensation []   Gross Motor Coordination []  ROM [] Delirium []                   Communication []    OT Comments: OT evaluation completed per order; nurse request per physician for discharge planning to assess any needs/functional deifcits area prior to discharge home independently. Pt observed to be independent with functional transfers & mobility to sink to perform light ADLs. Balance challenged with dynamic activities in prep of self care routine. Pt demo no LOB; no SOB despite recent PE's. Pt instructed on breathing techniques, pacing, work simplification strategies & recommended bathroom DME for safety and energy conservation during self care tasks and activities of daily living. No further OT needs identified at this time. Will discontinue pt from OT caseload. At end of session, pt requested to return to bed. Call light within reach, all lines and tubes intact. Pt instructed on use of call light for assistance and fall prevention    Evaluation Complexity: low  . Patient / Family Goal: to return to OF    Patient and/or family were instructed/educated on diagnosis, prognosis/goals and plan of care. Pt demonstrated G understanding. [] Malnutrition indicators have been identified and nursing has been notified to ensure a dietitian consult is ordered.          Low Evaluation   Time In:1503             Time Out: 1512                 Evaluation time includes thorough review of current medical information, gathering information on past medical history/social history and prior level of function, completion of standardized testing/informal observation of tasks, assessment of data and development of POC/Goals.      Tommy Zhao, OTR/L [id number]

## 2020-09-04 NOTE — PLAN OF CARE
Problem: Bleeding:  Goal: Will show no signs and symptoms of excessive bleeding  Description: Will show no signs and symptoms of excessive bleeding  9/3/2020 2102 by Hilda Sandhu RN  Outcome: Met This Shift  9/3/2020 0731 by Jd Kahn RN  Outcome: Met This Shift     Problem: Gas Exchange - Impaired:  Goal: Levels of oxygenation will improve  Description: Levels of oxygenation will improve  9/3/2020 2102 by Hilda Sandhu RN  Outcome: Met This Shift  9/3/2020 0731 by Jd Kahn RN  Outcome: Met This Shift

## 2020-09-04 NOTE — PROGRESS NOTES
Hospitalist Progress Note  [facility]       Patient: Johanne Woodard  Unit/Bed: 8266/1780-Z  YOB: 1950  MRN: [id number]  Acct: [de-identified]   AdmittingDiagnosis: Pulmonary embolism, bilateral ([facility] [address] [I26.99]  Admit Date: 9/2/2020  Hospital Day: 2    Subjective:    Patient is having problems with Worsening shortness of breath and finding of saddle pulmonary embolus    Patient Seen, Chart, Labs, Radiology studies, and Consults reviewed. Objective:   /78   Pulse 66   Temp 98.4 °F (36.9 °C) (Temporal)   Resp 15   Ht 5' 9\" (1.753 m)   Wt 205 lb (93 kg)   SpO2 96%   BMI 30.27 kg/m²       Intake/Output Summary (Last 24 hours) at 9/4/2020 1123  Last data filed at 9/4/2020 0400  Gross per 24 hour   Intake 240 ml   Output 375 ml   Net -135 ml       Physical Exam  General appearance: Elderly male, no apparent distress, appears stated age and cooperative. Normal habitus BMI 30.8. HEENT:  Normal cephalic, atraumatic without obvious deformity. Pupils equal, round, and reactive to light. Extra ocular muscles intact. Conjunctivae/corneas clear. Neck: Supple, with full range of motion. No jugular venous distention. Trachea midline. Respiratory:  Normal respiratory effort. Clear to auscultation, bilaterally without Rales/Wheezes/Rhonchi. Cardiovascular:  Regular rate and rhythm with normal G4/J3 with systolic murmur,no rubs or gallops. Abdomen: Soft, non-tender, non-distended with normal bowel sounds. Musculoskeletal:  No clubbing, cyanosis or edema bilaterally. Limited range of motion without deformity. Circumference of the calves both left and right poor similar in diameter at mid calf; no tenderness  Skin: Skin color, texture, turgor normal.  No rashes or lesions. Neurologic:  Neurovascularly intact without any focal sensory/motor deficits.  Cranial nerves: II-XII intact, grossly non-focal.  Psychiatric:  Alert and oriented, thought content appropriate, normal insight  Capillary Refill: Brisk,< 3 seconds   Peripheral Pulses: +2 palpable, equal bilaterally        Mitchell:No  Drains:No  Central Line/port: Yes Ekos line removed   EKG:Yes [reviewed by me] shows sinus tachycardia rate of 103 with nonspecific ST-T wave changes that are not changed from prior, suggestive of anterior and inferior ischemia. Imaging:Yes   Cta Pulmonary W Contrast Date: 2020  Patient MRN:  [id number] : 1950 Age: 77 years Gender: Male Order Date:  2020 10:27 AM EXAM: CTA PULMONARY W CONTRAST INDICATION:  hypoxia, tachycardia. r/o PE hypoxia, tachycardia. r/o PE COMPARISON: None Technique: Low-dose CT  acquisition technique included one of following options; 1 . Automated exposure control, 2. Adjustment of MA and or KV according to patient's size or 3. Use of iterative reconstruction. Contiguous spiral images were obtained in the axial plane, following the administration of intravenous contrast using CT angiographic protocol. Sagittal and coronal images were reconstructed from the axial plane acquisition. Addition MIP reconstructions were presented to aid in the interpretation of this study. FINDINGS: Prominent bilateral pulmonary emboli extending from the distal pulmonary arteries into the branches of all lobes. Probable pulmonary arterial hypertension, suggested by dilation of the main pulmonary artery (normal caliber less than 3 cm) to 3.2 cm. In the right ventricle is larger than the left and there is flattening of the interventricular septum indicating right heart strain. The heart is grossly normal in size. Mild calcified atherosclerosis is seen in the thoracic aorta which is tortuous but not aneurysmal. Within the left lower lobe, there is a 3.6 x 3.4 cm nodular opacity which is centrally dense and peripherally groundglass. Minimal dependent atelectasis seen in the right lower lobe. The lungs are otherwise clear. The central airway is clear. No pneumothorax or pleural effusion.  No lymphadenopathy seen in density    Abnormal liver enzymes    Metabolic acidosis    Ulcerative colitis ([facility] [address]    Anxiety    Mixed hyperlipidemia    Tobacco dependence    Alcohol use  Resolved Problems:    * No resolved hospital problems. *    Summary: Mr. Rj Tejeda, a 77 y.o. male who presented to [facility] from [facility] with shortness of breath for at least 2 months. This was on and off, moderate in intensity, worse with exertion, and associated with palpitations, dizziness and occasional pain around his calves. He was transferred to [facility] because of large bilateral pulmonary embolus with severe pulmonary hypertension and evidence of RV strain in addition to a left lower lobe nodule [3.6 x 3.4 cm nodular density]. He has had  thrombophlebitis in the 1980s. PMHx: hypertension, COPD, anxiety, hyperlipidemia, diverticulitis, elevated PSA level, alcohol and tobacco use.     Initial evaluation: /108, , respiratory 20 and labs: normal CBC, negative SARS-CoV-2, glucose 178, CO2 20, troponin 0 0.01, AST 77, ALT 97.    - Chest CTA showed large bilateral PEs, findings of possible pulmonary hypertension, evidence of RV strain, 3.6 x 3.4 cm nodular density in the left lower lobe with surrounding groundglass appearance peripherally suspicious for infectious versus inflammatory versus malignancy. 7 mm nonobstructing left kidney stone. - EKG shows sinus tachycardia rate of 103 with nonspecific ST-T wave changes that are not changed from prior.   - Echo showed EF of >55%, severely reduced RV function. Moderate TR, severe pulmonary hypertension and ascending aorta 4.2 cm. Patient was admitted [9/02/2020] and given intravascular TPA infusion via Ekos catheter.    - [9/03]  . # PE/core-Pulm/HTN: 2 L O2 sat 96%. Relation of pulmonary emboli and pulmonary nodule will need to be kept in mind. Cor pulmonale by CT and cardiac echo. Give intravascular TPA infusion via Ekos catheter overnight. Catheter was removed midmorning.   Eliquis was started along with continuing lisinopril 10 mg/d, metoprolol XL 25 mg/d. .# PulmNodule/SIRS/COPD: WBC normal, procalcitonin 0.08 elevated. Presumptive minimal infection, On empiric cefazolin loading and 2g every 8h [ordered by ICU team], but ceftriaxone may be a better agent. Follow-up pulmonary density in the left lower lobe while on treatment. And DuoNeb  . # BPH/ePSA: Repeat PSA, continue Tamsulosin 0.4 mg/d   . # Anx/Tobc/ETOH: Continue to monitor and continue Sertraline 100 mg/d  . # HLD: Continue Atorvastatin 10 mg hs    - [9/04]  . # PE/core-Pulm/HTN: RAO2 sat 93-96%. . S/p intravascular TPA infusion via Ekos catheter overnight and catheter was removed [9/03]. -149/67-92 on Eliquis, lisinopril 10 mg/d-> bid, metoprolol XL 25 mg/d, will add spironolactone 25 mg daily. Monitor for blood pressure response and OT and PT  . # PulmNodule/SIRS/COPD: WBC normal, procalcitonin 0.08 elevated. Presumptive minimal infection, On empiric cefazolin loading and 2g every 8h [ordered by ICU team], now stop. On DuoNeb. Will need follow-up pulmonary density in the left lower lobe while on treatment. .# BPH/ePSA: Repeat PSA, continue Tamsulosin 0.4 mg/d   . # Anx/Tobc/ETOH: Continue to monitor and continue Sertraline 100 mg/d  . # HLD: Continue Atorvastatin 10 mg hs      CODE STATUS: Full    Disposition: Home, Will need follow-up pulmonary density in the left lower lobe while on treatment.      Time Spent: 45 minutes  signed by Carole Narayan MD on 9/4/2020 at 11:23 [place] [address] Medicine Service

## 2020-09-04 NOTE — PROGRESS NOTES
without assistance. Demonstrates good balance and strength. Fair endurance with functional mobility. No further PT needs at this time. Pt left with call button in reach, lines attached, and needs met. Treatment:  Patient practiced and was instructed in the following treatment:     None     Pt's/ family goals   1. Home     Patient and or family understand(s) diagnosis, prognosis, and plan of care. Yes     PLAN:    PT care will be discontinued because pt is independent at this time. Please re-consult if necessary. Time in  1455  Time out  1503    Total Treatment Time  0 minutes     Evaluation Time includes thorough review of current medical information, gathering information on past medical history/social history and prior level of function, completion of standardized testing/informal observation of tasks, assessment of data and education on plan of care and goals.     CPT codes:  [x] Low Complexity PT evaluation 95448  [] Moderate Complexity PT evaluation 00550  [] High Complexity PT evaluation 84853  [] PT Re-evaluation 33404  [] Gait training 64670 - minutes  [] Manual therapy 34943 - minutes  [] Therapeutic activities 67464 - minutes  [] Therapeutic exercises 98269 - minutes  [] Neuromuscular reeducation 34122 - minutes     Kierra Charles, PT, DPT  [id number]

## 2020-09-05 VITALS
WEIGHT: 205 LBS | HEIGHT: 69 IN | TEMPERATURE: 97.6 F | OXYGEN SATURATION: 94 % | DIASTOLIC BLOOD PRESSURE: 94 MMHG | RESPIRATION RATE: 14 BRPM | BODY MASS INDEX: 30.36 KG/M2 | SYSTOLIC BLOOD PRESSURE: 132 MMHG | HEART RATE: 59 BPM

## 2020-09-05 PROBLEM — O22.30 DVT (DEEP VEIN THROMBOSIS) IN PREGNANCY: Status: ACTIVE | Noted: 2020-09-02

## 2020-09-05 PROBLEM — I50.30 DIASTOLIC CONGESTIVE HEART FAILURE, NYHA CLASS 2 (HCC): Status: ACTIVE | Noted: 2020-09-02

## 2020-09-05 LAB
ALBUMIN SERPL-MCNC: 3.7 G/DL (ref 3.5–5.2)
ALP BLD-CCNC: 53 U/L (ref 40–129)
ALT SERPL-CCNC: 37 U/L (ref 0–40)
ANION GAP SERPL CALCULATED.3IONS-SCNC: 12 MMOL/L (ref 7–16)
ANTICARDIOLIPIN IGA ANTIBODY: 2 APL (ref 0–11)
ANTICARDIOLIPIN IGG ANTIBODY: 0 GPL (ref 0–14)
AST SERPL-CCNC: 26 U/L (ref 0–39)
BILIRUB SERPL-MCNC: 0.5 MG/DL (ref 0–1.2)
BILIRUBIN DIRECT: <0.2 MG/DL (ref 0–0.3)
BILIRUBIN, INDIRECT: NORMAL MG/DL (ref 0–1)
BUN BLDV-MCNC: 15 MG/DL (ref 8–23)
CALCIUM SERPL-MCNC: 9.2 MG/DL (ref 8.6–10.2)
CARDIOLIPIN AB IGM: 0 MPL (ref 0–12)
CHLORIDE BLD-SCNC: 105 MMOL/L (ref 98–107)
CO2: 21 MMOL/L (ref 22–29)
CREAT SERPL-MCNC: 0.9 MG/DL (ref 0.7–1.2)
GFR AFRICAN AMERICAN: >60
GFR NON-AFRICAN AMERICAN: >60 ML/MIN/1.73
GLUCOSE BLD-MCNC: 98 MG/DL (ref 74–99)
HCT VFR BLD CALC: 42.7 % (ref 37–54)
HEMOGLOBIN: 14.7 G/DL (ref 12.5–16.5)
MCH RBC QN AUTO: 32.9 PG (ref 26–35)
MCHC RBC AUTO-ENTMCNC: 34.4 % (ref 32–34.5)
MCV RBC AUTO: 95.5 FL (ref 80–99.9)
PDW BLD-RTO: 13.6 FL (ref 11.5–15)
PLATELET # BLD: 178 E9/L (ref 130–450)
PMV BLD AUTO: 10.1 FL (ref 7–12)
POTASSIUM SERPL-SCNC: 4 MMOL/L (ref 3.5–5)
PRO-BNP: 274 PG/ML (ref 0–125)
RBC # BLD: 4.47 E12/L (ref 3.8–5.8)
SODIUM BLD-SCNC: 138 MMOL/L (ref 132–146)
TOTAL PROTEIN: 6.8 G/DL (ref 6.4–8.3)
WBC # BLD: 4.8 E9/L (ref 4.5–11.5)

## 2020-09-05 PROCEDURE — 6370000000 HC RX 637 (ALT 250 FOR IP): Performed by: NURSE PRACTITIONER

## 2020-09-05 PROCEDURE — 99231 SBSQ HOSP IP/OBS SF/LOW 25: CPT | Performed by: SURGERY

## 2020-09-05 PROCEDURE — 80076 HEPATIC FUNCTION PANEL: CPT

## 2020-09-05 PROCEDURE — 85027 COMPLETE CBC AUTOMATED: CPT

## 2020-09-05 PROCEDURE — 99239 HOSP IP/OBS DSCHRG MGMT >30: CPT | Performed by: INTERNAL MEDICINE

## 2020-09-05 PROCEDURE — 83880 ASSAY OF NATRIURETIC PEPTIDE: CPT

## 2020-09-05 PROCEDURE — 6370000000 HC RX 637 (ALT 250 FOR IP): Performed by: FAMILY MEDICINE

## 2020-09-05 PROCEDURE — 2580000003 HC RX 258: Performed by: SURGERY

## 2020-09-05 PROCEDURE — 80048 BASIC METABOLIC PNL TOTAL CA: CPT

## 2020-09-05 PROCEDURE — 36415 COLL VENOUS BLD VENIPUNCTURE: CPT

## 2020-09-05 PROCEDURE — 6370000000 HC RX 637 (ALT 250 FOR IP): Performed by: INTERNAL MEDICINE

## 2020-09-05 RX ORDER — SPIRONOLACTONE 25 MG/1
25 TABLET ORAL DAILY
Qty: 30 TABLET | Refills: 3 | Status: SHIPPED | OUTPATIENT
Start: 2020-09-06 | End: 2020-10-07

## 2020-09-05 RX ORDER — LISINOPRIL 20 MG/1
20 TABLET ORAL ONCE
Status: COMPLETED | OUTPATIENT
Start: 2020-09-05 | End: 2020-09-05

## 2020-09-05 RX ORDER — METOPROLOL SUCCINATE 50 MG/1
50 TABLET, EXTENDED RELEASE ORAL DAILY
Status: DISCONTINUED | OUTPATIENT
Start: 2020-09-06 | End: 2020-09-05 | Stop reason: HOSPADM

## 2020-09-05 RX ORDER — LISINOPRIL 30 MG/1
30 TABLET ORAL DAILY
Qty: 30 TABLET | Refills: 0 | Status: SHIPPED | OUTPATIENT
Start: 2020-09-06 | End: 2020-10-01 | Stop reason: SDUPTHER

## 2020-09-05 RX ORDER — METOPROLOL SUCCINATE 50 MG/1
50 TABLET, EXTENDED RELEASE ORAL DAILY
Qty: 30 TABLET | Refills: 0 | Status: SHIPPED | OUTPATIENT
Start: 2020-09-06 | End: 2020-10-01 | Stop reason: SDUPTHER

## 2020-09-05 RX ORDER — METOPROLOL SUCCINATE 25 MG/1
25 TABLET, EXTENDED RELEASE ORAL ONCE
Status: COMPLETED | OUTPATIENT
Start: 2020-09-05 | End: 2020-09-05

## 2020-09-05 RX ORDER — LISINOPRIL 10 MG/1
30 TABLET ORAL DAILY
Status: DISCONTINUED | OUTPATIENT
Start: 2020-09-06 | End: 2020-09-05 | Stop reason: HOSPADM

## 2020-09-05 RX ADMIN — METOPROLOL SUCCINATE 25 MG: 25 TABLET, EXTENDED RELEASE ORAL at 09:15

## 2020-09-05 RX ADMIN — TAMSULOSIN HYDROCHLORIDE 0.4 MG: 0.4 CAPSULE ORAL at 09:19

## 2020-09-05 RX ADMIN — SODIUM CHLORIDE, PRESERVATIVE FREE 10 ML: 5 INJECTION INTRAVENOUS at 09:15

## 2020-09-05 RX ADMIN — MESALAMINE 400 MG: 400 CAPSULE, DELAYED RELEASE ORAL at 09:14

## 2020-09-05 RX ADMIN — LISINOPRIL 10 MG: 10 TABLET ORAL at 09:15

## 2020-09-05 RX ADMIN — ATORVASTATIN CALCIUM 10 MG: 10 TABLET, FILM COATED ORAL at 09:19

## 2020-09-05 RX ADMIN — SPIRONOLACTONE 25 MG: 25 TABLET ORAL at 09:14

## 2020-09-05 RX ADMIN — MESALAMINE 400 MG: 400 CAPSULE, DELAYED RELEASE ORAL at 13:00

## 2020-09-05 RX ADMIN — APIXABAN 10 MG: 5 TABLET, FILM COATED ORAL at 09:15

## 2020-09-05 RX ADMIN — METOPROLOL SUCCINATE 25 MG: 25 TABLET, EXTENDED RELEASE ORAL at 12:58

## 2020-09-05 RX ADMIN — LISINOPRIL 20 MG: 20 TABLET ORAL at 12:59

## 2020-09-05 RX ADMIN — SERTRALINE HYDROCHLORIDE 100 MG: 100 TABLET ORAL at 09:14

## 2020-09-05 ASSESSMENT — PAIN SCALES - GENERAL
PAINLEVEL_OUTOF10: 0

## 2020-09-05 NOTE — PROGRESS NOTES
Vascular Surgery Progress Note    Pt is being seen in f/u today regarding PE  Subjective  Pt s/e. Feeling better. Denies current sob or chest pain.   Denies groin pain  Current Medications:      hydrALAZINE, sodium chloride flush, acetaminophen, ondansetron    spironolactone  25 mg Oral Daily    influenza virus vaccine  0.5 mL Intramuscular Prior to discharge    lisinopril  10 mg Oral BID    atorvastatin  10 mg Oral Daily    mesalamine  400 mg Oral TID    mercaptopurine  50 mg Oral BID    metoprolol succinate  25 mg Oral Daily    sertraline  100 mg Oral Daily    tamsulosin  0.4 mg Oral Daily    apixaban  10 mg Oral BID    Followed by   Abdiel Saldaña ON 9/10/2020] apixaban  5 mg Oral BID    sodium chloride flush  10 mL Intravenous 2 times per day      PHYSICAL EXAM:    BP (!) 156/74   Pulse 54   Temp 98 °F (36.7 °C)   Resp 16   Ht 5' 9\" (1.753 m)   Wt 205 lb (93 kg)   SpO2 95%   BMI 30.27 kg/m²   No intake or output data in the 24 hours ending 09/05/20 0745     Gen awake and alert  CVS S1S2  Resp good resp excursion  Abd soft nt nd  R LE Hematoma is not present    LABS:    Lab Results   Component Value Date    WBC 4.8 09/05/2020    HGB 14.7 09/05/2020    HCT 42.7 09/05/2020     09/05/2020    PROTIME 12.7 (H) 08/10/2020    INR 1.1 08/10/2020    APTT 50.1 (H) 09/03/2020    K 4.0 09/05/2020    BUN 15 09/05/2020    CREATININE 0.9 09/05/2020       A/P SubMassive PE with acute cor pulmonale s/p PE lysis with EKOS  Patient does not have pain at access point  Hemodynamics remain improved  No significant resp issues    Ok for dc from vascular pov    L LE DVT   · Eliquis   · Per hematology Dr. Debby Reyez - lifetime anticoagulation  · Etiology is likely unknown  · No indication for placement of IVC Filter  · Elevate Bilateral LE while in bed or sitting  · Tubigrip  Bilateral LE while in the hospital  · Compression stockings kneehigh 20-30 mm hg wear daily - Script will be left on chart  · F/U as outpatient in 1 months  · Call if patient develops worsening of swelling or wounds  · discussed with patient pathophysiology of DVT, PE, and thrombophlebitis   · All ?s answered    Shante Wright   `

## 2020-09-05 NOTE — PROGRESS NOTES
Hospitalist Progress Note  [facility]       Patient: Letha Gould  Unit/Bed: 3590/6892-V  YOB: 1950  MRN: [id number]  Acct: [de-identified]   AdmittingDiagnosis: Pulmonary embolism, bilateral ([facility] [address] [I26.99]  Admit Date: 9/2/2020  Hospital Day: 3    Subjective:    Patient is having problems with Worsening shortness of breath and finding of saddle pulmonary embolus    Patient Seen, Chart, Labs, Radiology studies, and Consults reviewed. Objective:   /87   Pulse 56   Temp 98 °F (36.7 °C) (Temporal)   Resp 18   Ht 5' 9\" (1.753 m)   Wt 205 lb (93 kg)   SpO2 94%   BMI 30.27 kg/m²       Intake/Output Summary (Last 24 hours) at 9/5/2020 0934  Last data filed at 9/5/2020 0800  Gross per 24 hour   Intake 240 ml   Output --   Net 240 ml       Physical Exam  General appearance: Elderly male, no apparent distress, appears stated age and cooperative. Normal habitus BMI 30.8. HEENT:  Normal cephalic, atraumatic without obvious deformity. Pupils equal, round, and reactive to light. Extra ocular muscles intact. Conjunctivae/corneas clear. Neck: Supple, with full range of motion. No jugular venous distention. Trachea midline. Respiratory:  Normal respiratory effort. Clear to auscultation, bilaterally without Rales/Wheezes/Rhonchi. Cardiovascular:  Regular rate and rhythm with normal J7/S6 with systolic murmur,no rubs or gallops. Abdomen: Soft, non-tender, non-distended with normal bowel sounds. Musculoskeletal:  No clubbing, cyanosis or edema bilaterally. Limited range of motion without deformity. Circumference of the calves both left and right poor similar in diameter at mid calf; no tenderness  Skin: Skin color, texture, turgor normal.  No rashes or lesions. Neurologic:  Neurovascularly intact without any focal sensory/motor deficits.  Cranial nerves: II-XII intact, grossly non-focal.  Psychiatric:  Alert and oriented, thought content appropriate, normal insight  Capillary Refill: Brisk,< 3 seconds   Peripheral Pulses: +2 palpable, equal bilaterally        Mitchell:No  Drains:No  Central Line/port: Yes Ekos line removed   EKG:Yes [reviewed by me] shows sinus tachycardia rate of 103 with nonspecific ST-T wave changes that are not changed from prior, suggestive of anterior and inferior ischemia. Imaging:Yes   Cta Pulmonary W Contrast Date: 2020  Patient MRN:  [id number] : 1950 Age: 77 years Gender: Male Order Date:  2020 10:27 AM EXAM: CTA PULMONARY W CONTRAST INDICATION:  hypoxia, tachycardia. r/o PE hypoxia, tachycardia. r/o PE COMPARISON: None Technique: Low-dose CT  acquisition technique included one of following options; 1 . Automated exposure control, 2. Adjustment of MA and or KV according to patient's size or 3. Use of iterative reconstruction. Contiguous spiral images were obtained in the axial plane, following the administration of intravenous contrast using CT angiographic protocol. Sagittal and coronal images were reconstructed from the axial plane acquisition. Addition MIP reconstructions were presented to aid in the interpretation of this study. FINDINGS: Prominent bilateral pulmonary emboli extending from the distal pulmonary arteries into the branches of all lobes. Probable pulmonary arterial hypertension, suggested by dilation of the main pulmonary artery (normal caliber less than 3 cm) to 3.2 cm. In the right ventricle is larger than the left and there is flattening of the interventricular septum indicating right heart strain. The heart is grossly normal in size. Mild calcified atherosclerosis is seen in the thoracic aorta which is tortuous but not aneurysmal. Within the left lower lobe, there is a 3.6 x 3.4 cm nodular opacity which is centrally dense and peripherally groundglass. Minimal dependent atelectasis seen in the right lower lobe. The lungs are otherwise clear. The central airway is clear. No pneumothorax or pleural effusion.  No lymphadenopathy seen in the chest. The upper abdomen included in the field-of-view of this study demonstrates no acute abnormality. 7 mm nonobstructive left renal calculus. Kidneys are only partially included in the field-of-view of this study. Small right renal cyst. Evaluation of the bones reveals no fracture or destructive lesion. 1. Prominent bilateral pulmonary emboli with evidence of right heart strain (dilated right ventricular chamber, with straightening of the intraventricular septum). 2. 3.6 x 3.4 cm left lower lobe nodular pulmonary opacities centrally hyperdense and peripherally groundglass. As the opacity is not pleural-based, the appearance is not typical for an infarction. It may be infectious, inflammatory or neoplastic in etiology. Follow-up CT of the chest is recommended in 3 months. 3. No lymphadenopathy. 4. 7 mm nonobstructive left renal calculus.       Diet:  DIET CARDIAC;    Medications:    spironolactone  25 mg Oral Daily    influenza virus vaccine  0.5 mL Intramuscular Prior to discharge    lisinopril  10 mg Oral BID    atorvastatin  10 mg Oral Daily    mesalamine  400 mg Oral TID    mercaptopurine  50 mg Oral BID    metoprolol succinate  25 mg Oral Daily    sertraline  100 mg Oral Daily    tamsulosin  0.4 mg Oral Daily    apixaban  10 mg Oral BID    Followed by   Alfredo Flaherty ON 9/10/2020] apixaban  5 mg Oral BID    sodium chloride flush  10 mL Intravenous 2 times per day     Continuous Infusions:    PRNMeds:hydrALAZINE, sodium chloride flush, acetaminophen, ondansetron  DVT Prophylaxis:Therapeutic heparin infusion and Encourage ambulation, low risk for DVT, no chemical or mechanical prophylaxis necessary    Data:  CBC:  Recent Labs     09/03/20  0705 09/04/20  0850 09/05/20  0420   WBC 5.9 5.1 4.8   RBC 4.33 4.52 4.47   HGB 14.0 14.9 14.7   HCT 41.7 43.4 42.7   MCV 96.3 96.0 95.5   RDW 13.5 13.6 13.6    174 178     BMP:  Recent Labs     09/03/20  0705 09/04/20  0850 09/05/20  0420    139 138   K 3.9 4.1 4.0    104 105   CO2 19* 23 21*   BUN 16 13 15   CREATININE 0.9 0.9 0.9     BNP: No results for input(s): BNP in the last 72 hours. PT/INR: No results for input(s): PROTIME, INR in the last 72 hours.  :   Recent Labs     09/02/20  2130 09/03/20  0130 09/03/20  0705   APTT 55.9* 55.5* 50.1*     CARDIAC ENZYMES:No results for input(s): CKMB, CKMBINDEX, TROPONINT in the last 72 hours.     Invalid input(s): CKTOTAL;3  FASTING LIPID PANEL:  Lab Results   Component Value Date    CHOL 160 06/17/2020    HDL 55 06/17/2020    TRIG 107 06/17/2020     LIVER PROFILE:   Recent Labs     09/02/20  0940 09/03/20  0705 09/05/20  0420   AST 77* 37 26   ALT 97* 64* 37   BILIDIR  --  0.2 <0.2   BILITOT 0.7 1.0 0.5   ALKPHOS 71 58 53      ABGs: No results found for: PH, PCO2, PO2, HCO3, O2SAT    Vascular surgery Dr. Jono Dorado [9/05/2020]  A/P SubMassive PE with acute cor pulmonale s/p PE lysis with EKOS  · Patient does not have pain at access point  · Hemodynamics remain improved  · No significant resp issues     Ok for dc from vascular pov    L - LE DVT   · Eliquis   · Per hematology Dr. Martinez Pollen - lifetime anticoagulation  · Etiology is likely unknown  · No indication for placement of IVC Filter  · Elevate Bilateral LE while in bed or sitting  · Tubigrip  Bilateral LE while in the hospital  · Compression stockings kneehigh 20-30 mm hg wear daily - Script will be left on chart  · F/U as outpatient in 1 months  · Call if patient develops worsening of swelling or wounds  · discussed with patient pathophysiology of DVT, PE, and thrombophlebitis   · All ?s answered     Assessment/Plan:  Principal Problem:    Acute saddle pulmonary embolism with acute cor pulmonale (HCC)  Active Problems:    Uncontrolled hypertension    Hyperglycemia    SIRS (systemic inflammatory response syndrome) (HCC)    Cor pulmonale (chronic) (HCC)    Elevated PSA    Benign prostatic hyperplasia with urinary frequency    Shortness of breath    COPD (chronic obstructive pulmonary disease) (HCC)    Nodular radiologic density    Abnormal liver enzymes    Metabolic acidosis    Ulcerative colitis ([address]    Anxiety    Mixed hyperlipidemia    Tobacco dependence    Alcohol use  Resolved Problems:    * No resolved hospital problems. *    Summary: Mr. Coco Dickson, a 77 y.o. male who presented to [facility] from [facility] with shortness of breath for at least 2 months. This was on and off, moderate in intensity, worse with exertion, and associated with palpitations, dizziness and occasional pain around his calves. He was transferred to [facility] because of large bilateral pulmonary embolus with severe pulmonary hypertension and evidence of RV strain in addition to a left lower lobe nodule [3.6 x 3.4 cm nodular density]. He has had  thrombophlebitis in the 1980s. PMHx: hypertension, COPD, anxiety, hyperlipidemia, diverticulitis, elevated PSA level, alcohol and tobacco use.     Initial evaluation: /108, , respiratory 20 and labs: normal CBC, negative SARS-CoV-2, glucose 178, CO2 20, troponin 0 0.01, AST 77, ALT 97.    - Chest CTA showed large bilateral PEs, findings of possible pulmonary hypertension, evidence of RV strain, 3.6 x 3.4 cm nodular density in the left lower lobe with surrounding groundglass appearance peripherally suspicious for infectious versus inflammatory versus malignancy. 7 mm nonobstructing left kidney stone. - EKG shows sinus tachycardia rate of 103 with nonspecific ST-T wave changes that are not changed from prior.   - Echo showed EF of >55%, severely reduced RV function. Moderate TR, severe pulmonary hypertension and ascending aorta 4.2 cm. Patient was admitted [9/02/2020] and given intravascular TPA infusion via Ekos catheter.    - [9/03]  . # PE/core-Pulm/HTN: 2 L O2 sat 96%. Relation of pulmonary emboli and pulmonary nodule will need to be kept in mind. Cor pulmonale by CT and cardiac echo.  Give intravascular TPA infusion via Ekos catheter overnight. Catheter was removed midmorning. Eliquis was started along with continuing lisinopril 10 mg/d, metoprolol XL 25 mg/d. .# PulmNodule/SIRS/COPD: WBC normal, procalcitonin 0.08 elevated. Presumptive minimal infection, On empiric cefazolin loading and 2g every 8h [ordered by ICU team], but ceftriaxone may be a better agent. Follow-up pulmonary density in the left lower lobe while on treatment. And DuoNeb  . # BPH/ePSA: Repeat PSA, continue Tamsulosin 0.4 mg/d   . # Anx/Tobc/ETOH: Continue to monitor and continue Sertraline 100 mg/d  . # HLD: Continue Atorvastatin 10 mg hs    - [9/04]  . # PE/core-Pulm/HTN: RAO2 sat 93-96%. . S/p intravascular TPA infusion via Ekos catheter overnight and catheter was removed [9/03]. -149/67-92 on Eliquis, lisinopril 10 mg/d-> bid, metoprolol XL 25 mg/d, will add spironolactone 25 mg daily. Monitor for blood pressure response and OT and PT  . # PulmNodule/SIRS/COPD: WBC normal, procalcitonin 0.08 elevated. Presumptive minimal infection, On empiric cefazolin loading and 2g every 8h [ordered by ICU team], now stop. On DuoNeb. Will need follow-up pulmonary density in the left lower lobe while on treatment.     - [9/05]  . # PE/core-Pulm/HTN: RAO2 sat 93-96%. on Eliquis for PE s/p TPA lysis. Cardiac strain continues with hypertension uncontrolled. -> pending. -164/ lisinopril 10 mg-> bid-> 30mg/d, metoprolol XL 25 -> 50mg/d, will add spironolactone 25 mg daily. Monitor for blood pressure response and OT and PT  . # PulmNodule/SIRS/COPD: WBC normal, procalcitonin 0.08 elevated. Presumptive minimal infection, On empiric cefazolin loading and 2g every 8h [ordered by ICU team], now stop. On DuoNeb. Will need follow-up pulmonary density in the left lower lobe while on treatment. .# BPH/ePSA: PSA 5.54, continue Tamsulosin 0.4 mg/d   . # Anx/Tobc/ETOH: Continue to monitor and continue Sertraline 100 mg/d  . # HLD: Continue Atorvastatin 10 mg hs    CODE STATUS: Full    CODE STATUS: Full    Disposition: Home, Will need follow-up pulmonary density in the left lower lobe while on treatment.      Time Spent: 45 minutes  signed by Fouzia Landers MD on 9/5/2020 at 9:34 [place] [id number] Medicine Service

## 2020-09-05 NOTE — PLAN OF CARE
Problem: Bleeding:  Goal: Will show no signs and symptoms of excessive bleeding  Description: Will show no signs and symptoms of excessive bleeding  9/5/2020 0934 by Robi Manning RN  Outcome: Met This Shift  9/4/2020 2038 by Randolph Bermudez RN  Outcome: Met This Shift  9/4/2020 2038 by Randolph Bermudez RN  Outcome: Met This Shift     Problem: Gas Exchange - Impaired:  Goal: Levels of oxygenation will improve  Description: Levels of oxygenation will improve  9/5/2020 0934 by Robi Manning RN  Outcome: Met This Shift  9/4/2020 2038 by Randolph Bermudez RN  Outcome: Met This Shift  9/4/2020 2038 by Randolph Bermudez RN  Outcome: Met This Shift

## 2020-09-05 NOTE — DISCHARGE SUMMARY
[facility] [place]   [place]    Patient:  Thania Jackson  YOB: 1950    MRN: [id number]   Acct: [de-identified]    Primary Care Physician: Cheko Snow DO    Admit date:  9/2/2020    Discharge date:   9/05/2020      Discharge Diagnoses:   Acute saddle pulmonary embolism with acute cor pulmonale (HCC)  Principal Problem:    Acute saddle pulmonary embolism with acute cor pulmonale (HCC)  Active Problems:    Uncontrolled hypertension    Cor pulmonale (chronic) (HCC)    Diastolic congestive heart failure, NYHA class 2 (HCC)    Elevated PSA    Benign prostatic hyperplasia with urinary frequency    Shortness of breath    COPD (chronic obstructive pulmonary disease) (HCC)    Nodular radiologic density    Abnormal liver enzymes    Hyperglycemia    Metabolic acidosis    SIRS (systemic inflammatory response syndrome) (HCC)    Ulcerative colitis (HCC)    Anxiety    Mixed hyperlipidemia    Tobacco dependence    Alcohol use    DVT (deep vein thrombosis) in pregnancy  Resolved Problems:    * No resolved hospital problems. *        Admitted for: (HPI) see H&P    Hospital Course: Mr. Zander Blum, a 77 y. o. male who presented to [facility] from [place] with shortness of breath for at least 2 months.  This was on and off, moderate in intensity, worse with exertion, and associated with palpitations, dizziness and occasional pain around his calves. He was transferred to [facility] because of large bilateral pulmonary embolus with severe pulmonary hypertension and evidence of RV strain in addition to a left lower lobe nodule [3.6 x 3.4 cm nodular density]. He has had  thrombophlebitis in the 1980s.    PMHx: hypertension, COPD, anxiety, hyperlipidemia, diverticulitis, elevated PSA level, alcohol and tobacco use.     Initial evaluation: /108, , respiratory 20 and labs: normal CBC, negative SARS-CoV-2, glucose 178, CO2 20, troponin 0 0.01, AST 77, ALT 97.    - Chest CTA showed large bilateral follow-up pulmonary density in the left lower lobe while on treatment.      - [9/05]  . # PE/core-Pulm/HTN: RAO2 sat 93-96%. on Eliquis for PE s/p TPA lysis. Cardiac strain/CHF continues with hypertension uncontrolled. -> 274. -164/ lisinopril 10 mg-> bid-> 30mg/d, metoprolol XL 25 -> 50mg/d, will add spironolactone 25 mg daily. Monitor for blood pressure response and OT and PT  . # PulmNodule/SIRS/COPD: WBC normal, procalcitonin 0.08 elevated. Presumptive minimal infection, On empiric cefazolin loading and 2g every 8h [ordered by ICU team], now stop. On DuoNeb. Will need follow-up pulmonary density in the left lower lobe while on treatment. .# BPH/ePSA: PSA 5.54, continue Tamsulosin 0.4 mg/d   . # Anx/Tobc/ETOH: Continue to monitor and continue Sertraline 100 mg/d  . # HLD: Continue Atorvastatin 10 mg hs    Consultants:  Vascular Dr. Juani Singh  Vascular surgery Dr. Juani Singh [9/05/2020]  A/P SubMassive PE with acute cor pulmonale s/p PE lysis with EKOS  · Patient does not have pain at access point  · Hemodynamics remain improved  · No significant resp issues     Ok for dc from vascular pov     L - LE DVT   · Eliquis   · Per hematology Dr. Colie Fothergill - lifetime anticoagulation  · Etiology is likely unknown  · No indication for placement of IVC Filter  · Elevate Bilateral LE while in bed or sitting  · Tubigrip  Bilateral LE while in the hospital  · Compression stockings kneehigh 20-30 mm hg wear daily - Script will be left on chart  · F/U as outpatient in 1 months  · Call if patient develops worsening of swelling or wounds  · discussed with patient pathophysiology of DVT, PE, and thrombophlebitis   · All ?s answered    Discharge Medications:       Medication List      START taking these medications    * apixaban 5 MG Tabs tablet  Commonly known as:  ELIQUIS  Take 2 tablets by mouth 2 times daily for 7 days     * apixaban 5 MG Tabs tablet  Commonly known as:  ELIQUIS  Take 1 tablet by mouth 2 times daily Taper to 5mg bid after initial 7-days of 10mg [2-tab 5mg] bid  Start taking on:  September 10, 2020     JOBST KNEE HIGH COMPRESSION SM Misc  Dx: Varicose veins of the legs with pain and swelling. Bilateral thigh-high 20-30 mm Hg compression stockings. spironolactone 25 MG tablet  Commonly known as:  ALDACTONE  Take 1 tablet by mouth daily  Start taking on:  September 6, 2020         * This list has 2 medication(s) that are the same as other medications prescribed for you. Read the directions carefully, and ask your doctor or other care provider to review them with you.             CHANGE how you take these medications    lisinopril 30 MG tablet  Commonly known as:  PRINIVIL;ZESTRIL  Take 1 tablet by mouth daily  Start taking on:  September 6, 2020  What changed:    · medication strength  · how much to take     metoprolol succinate 50 MG extended release tablet  Commonly known as:  TOPROL XL  Take 1 tablet by mouth daily  Start taking on:  September 6, 2020  What changed:    · medication strength  · how much to take        CONTINUE taking these medications    atorvastatin 10 MG tablet  Commonly known as:  LIPITOR  Take 1 tablet by mouth daily     Flomax 0.4 MG capsule  Generic drug:  tamsulosin     folic acid 1 MG tablet  Commonly known as:  FOLVITE  Take 1 tablet by mouth daily     Lialda 1.2 g EC tablet  Generic drug:  mesalamine  Take 1 tablet by mouth 3 times daily     mercaptopurine 50 MG chemo tablet  Commonly known as:  PURINETHOL  Take 1 tablet by mouth 2 times daily     sertraline 100 MG tablet  Commonly known as:  ZOLOFT  Take 1 tablet by mouth daily        STOP taking these medications    propranolol 60 MG extended release capsule  Commonly known as:  INDERAL LA           Where to Get Your Medications      You can get these medications from any pharmacy    Bring a paper prescription for each of these medications  · apixaban 5 MG Tabs tablet  · apixaban 5 MG Tabs 12.0 fL   APTT   Result Value Ref Range    aPTT 55.9 (H) 24.5 - 35.1 sec   Fibrinogen   Result Value Ref Range    Fibrinogen 487 225 - 540 mg/dL   Procalcitonin   Result Value Ref Range    Procalcitonin 0.08 0.00 - 0.08 ng/mL   PSA, DIAGNOSTIC   Result Value Ref Range    PSA 6.20 (H) 0.00 - 4.00 ng/mL   Hepatic Function Panel   Result Value Ref Range    Total Protein 6.6 6.4 - 8.3 g/dL    Alb 3.7 3.5 - 5.2 g/dL    Alkaline Phosphatase 58 40 - 129 U/L    ALT 64 (H) 0 - 40 U/L    AST 37 0 - 39 U/L    Total Bilirubin 1.0 0.0 - 1.2 mg/dL    Bilirubin, Direct 0.2 0.0 - 0.3 mg/dL    Bilirubin, Indirect 0.8 0.0 - 1.0 mg/dL   Hemoglobin A1C   Result Value Ref Range    Hemoglobin A1C 6.0 (H) 4.0 - 5.6 %   Troponin   Result Value Ref Range    Troponin 0.08 (H) 0.00 - 0.03 ng/mL   APTT   Result Value Ref Range    aPTT 55.5 (H) 24.5 - 35.1 sec   APTT   Result Value Ref Range    aPTT 50.1 (H) 24.5 - 35.1 sec   CBC   Result Value Ref Range    WBC 6.8 4.5 - 11.5 E9/L    RBC 4.32 3.80 - 5.80 E12/L    Hemoglobin 14.1 12.5 - 16.5 g/dL    Hematocrit 41.5 37.0 - 54.0 %    MCV 96.1 80.0 - 99.9 fL    MCH 32.6 26.0 - 35.0 pg    MCHC 34.0 32.0 - 34.5 %    RDW 13.4 11.5 - 15.0 fL    Platelets 286 712 - 859 E9/L    MPV 10.3 7.0 - 12.0 fL   CBC   Result Value Ref Range    WBC 5.9 4.5 - 11.5 E9/L    RBC 4.33 3.80 - 5.80 E12/L    Hemoglobin 14.0 12.5 - 16.5 g/dL    Hematocrit 41.7 37.0 - 54.0 %    MCV 96.3 80.0 - 99.9 fL    MCH 32.3 26.0 - 35.0 pg    MCHC 33.6 32.0 - 34.5 %    RDW 13.5 11.5 - 15.0 fL    Platelets 498 293 - 308 E9/L    MPV 10.0 7.0 - 12.0 fL   Fibrinogen   Result Value Ref Range    Fibrinogen 486 225 - 540 mg/dL   Fibrinogen   Result Value Ref Range    Fibrinogen 508 225 - 540 mg/dL   Basic metabolic panel   Result Value Ref Range    Sodium 140 132 - 146 mmol/L    Potassium 3.9 3.5 - 5.0 mmol/L    Chloride 106 98 - 107 mmol/L    CO2 19 (L) 22 - 29 mmol/L    Anion Gap 15 7 - 16 mmol/L    Glucose 89 74 - 99 mg/dL    BUN 16 8 - 23 mg/dL    CREATININE 0.9 0.7 - 1.2 mg/dL    GFR Non-African American >60 >=60 mL/min/1.73    GFR African American >60     Calcium 8.7 8.6 - 10.2 mg/dL   TROPONIN   Result Value Ref Range    Troponin 0.07 (H) 0.00 - 0.03 ng/mL   Brain Natriuretic Peptide   Result Value Ref Range    Pro-BNP 2,713 (H) 0 - 125 pg/mL   DRVVT-Lupus Like Anticoag   Result Value Ref Range    Lupus Anticoag DVVT NEGATIVE NEGATIVE   Sedimentation Rate   Result Value Ref Range    Sed Rate 10 0 - 15 mm/Hr   C-reactive protein   Result Value Ref Range    CRP 1.0 (H) 0.0 - 0.4 mg/dL   Procalcitonin   Result Value Ref Range    Procalcitonin 0.08 0.00 - 0.08 ng/mL   SPECIMEN REJECTION   Result Value Ref Range    Rejected Test liver psa homoc     Reason for Rejection see below    Psa screening   Result Value Ref Range    PSA 5.54 (H) 0.00 - 4.00 ng/mL   Brain Natriuretic Peptide   Result Value Ref Range    Pro- (H) 0 - 125 pg/mL   Homocysteine, Serum   Result Value Ref Range    Homocysteine 9.5 0.0 - 15.0 umol/L   CBC   Result Value Ref Range    WBC 5.1 4.5 - 11.5 E9/L    RBC 4.52 3.80 - 5.80 E12/L    Hemoglobin 14.9 12.5 - 16.5 g/dL    Hematocrit 43.4 37.0 - 54.0 %    MCV 96.0 80.0 - 99.9 fL    MCH 33.0 26.0 - 35.0 pg    MCHC 34.3 32.0 - 34.5 %    RDW 13.6 11.5 - 15.0 fL    Platelets 760 687 - 817 E9/L    MPV 10.1 7.0 - 12.0 fL   Basic Metabolic Panel   Result Value Ref Range    Sodium 139 132 - 146 mmol/L    Potassium 4.1 3.5 - 5.0 mmol/L    Chloride 104 98 - 107 mmol/L    CO2 23 22 - 29 mmol/L    Anion Gap 12 7 - 16 mmol/L    Glucose 107 (H) 74 - 99 mg/dL    BUN 13 8 - 23 mg/dL    CREATININE 0.9 0.7 - 1.2 mg/dL    GFR Non-African American >60 >=60 mL/min/1.73    GFR African American >60     Calcium 9.1 8.6 - 10.2 mg/dL   Hepatic Function Panel   Result Value Ref Range    Total Protein 6.8 6.4 - 8.3 g/dL    Alb 3.7 3.5 - 5.2 g/dL    Alkaline Phosphatase 53 40 - 129 U/L    ALT 37 0 - 40 U/L    AST 26 0 - 39 U/L    Total Bilirubin 0.5 0.0 - 1.2 mg/dL    Bilirubin, Direct <0.2 0.0 - 0.3 mg/dL    Bilirubin, Indirect see below 0.0 - 1.0 mg/dL   CBC   Result Value Ref Range    WBC 4.8 4.5 - 11.5 E9/L    RBC 4.47 3.80 - 5.80 E12/L    Hemoglobin 14.7 12.5 - 16.5 g/dL    Hematocrit 42.7 37.0 - 54.0 %    MCV 95.5 80.0 - 99.9 fL    MCH 32.9 26.0 - 35.0 pg    MCHC 34.4 32.0 - 34.5 %    RDW 13.6 11.5 - 15.0 fL    Platelets 015 160 - 660 E9/L    MPV 10.1 7.0 - 12.0 fL   Basic Metabolic Panel   Result Value Ref Range    Sodium 138 132 - 146 mmol/L    Potassium 4.0 3.5 - 5.0 mmol/L    Chloride 105 98 - 107 mmol/L    CO2 21 (L) 22 - 29 mmol/L    Anion Gap 12 7 - 16 mmol/L    Glucose 98 74 - 99 mg/dL    BUN 15 8 - 23 mg/dL    CREATININE 0.9 0.7 - 1.2 mg/dL    GFR Non-African American >60 >=60 mL/min/1.73    GFR African American >60     Calcium 9.2 8.6 - 10.2 mg/dL   Brain Natriuretic Peptide   Result Value Ref Range    Pro- (H) 0 - 125 pg/mL       Diet:  DIET CARDIAC;     Activity:  Activity as tolerated (Patient may move about with assist as indicated or with supervision.)    Follow-up:  in 5-6 days with Alley Anderson DO,  In 2-3 weeks Dr. Erin Grider    Disposition: home    Condition: Stable      Time Spent: 45 minutes    Electronically signed by Marc Bello MD on 9/5/2020 at 1:03 PM    Discharging Hospitalist

## 2020-09-08 ENCOUNTER — TELEPHONE (OUTPATIENT)
Dept: VASCULAR SURGERY | Age: 70
End: 2020-09-08

## 2020-09-08 NOTE — TELEPHONE ENCOUNTER
Spoke with pt to scheduled post-PE lysis testing. Pt stated his pulmonologist is scheduling CTA chest; [facility] to contact pt to schedule 2-D echo prior to his 10/5 ov with Dr. Demarcus Hebert.

## 2020-09-09 ENCOUNTER — OFFICE VISIT (OUTPATIENT)
Dept: PRIMARY CARE CLINIC | Age: 70
End: 2020-09-09
Payer: MEDICARE

## 2020-09-09 VITALS
WEIGHT: 194.8 LBS | BODY MASS INDEX: 33.26 KG/M2 | HEART RATE: 53 BPM | HEIGHT: 64 IN | RESPIRATION RATE: 16 BRPM | OXYGEN SATURATION: 94 % | DIASTOLIC BLOOD PRESSURE: 70 MMHG | TEMPERATURE: 97.5 F | SYSTOLIC BLOOD PRESSURE: 128 MMHG

## 2020-09-09 PROBLEM — R65.10 SIRS (SYSTEMIC INFLAMMATORY RESPONSE SYNDROME) (HCC): Status: RESOLVED | Noted: 2020-09-02 | Resolved: 2020-09-09

## 2020-09-09 PROBLEM — E87.20 METABOLIC ACIDOSIS: Status: RESOLVED | Noted: 2020-09-02 | Resolved: 2020-09-09

## 2020-09-09 PROBLEM — R91.1 LUNG NODULE: Status: ACTIVE | Noted: 2020-09-09

## 2020-09-09 PROBLEM — I10 ESSENTIAL HYPERTENSION: Status: ACTIVE | Noted: 2017-12-05

## 2020-09-09 PROCEDURE — 99495 TRANSJ CARE MGMT MOD F2F 14D: CPT | Performed by: INTERNAL MEDICINE

## 2020-09-09 PROCEDURE — 1111F DSCHRG MED/CURRENT MED MERGE: CPT | Performed by: INTERNAL MEDICINE

## 2020-09-09 ASSESSMENT — ENCOUNTER SYMPTOMS
SORE THROAT: 0
STRIDOR: 0
BLOOD IN STOOL: 0
COUGH: 0
WHEEZING: 0
COLOR CHANGE: 0
TROUBLE SWALLOWING: 0
DIARRHEA: 0
CONSTIPATION: 0
EYE DISCHARGE: 0
ABDOMINAL PAIN: 0
RHINORRHEA: 0
SHORTNESS OF BREATH: 1
EYE PAIN: 0
FACIAL SWELLING: 0
ANAL BLEEDING: 0
NAUSEA: 0
PHOTOPHOBIA: 0
EYE ITCHING: 0
VOMITING: 0

## 2020-09-10 LAB
ANTIPHOSPHOLIPID AB IGG: 0 GPL (ref 0–14)
ANTIPHOSPHOLIPID AB IGM: 6 MPL (ref 0–14)
THROMBOPHILIA DNA ASSAY: NORMAL

## 2020-09-23 ENCOUNTER — HOSPITAL ENCOUNTER (OUTPATIENT)
Dept: CT IMAGING | Age: 70
Discharge: HOME | End: 2020-09-25
Payer: MEDICARE

## 2020-09-23 PROCEDURE — 71250 CT THORAX DX C-: CPT

## 2020-09-28 ENCOUNTER — TELEPHONE (OUTPATIENT)
Dept: CARDIOLOGY | Age: 70
End: 2020-09-28

## 2020-09-29 ENCOUNTER — OFFICE VISIT (OUTPATIENT)
Dept: CARDIOLOGY CLINIC | Age: 70
End: 2020-09-29
Payer: MEDICARE

## 2020-09-29 ENCOUNTER — HOSPITAL ENCOUNTER (OUTPATIENT)
Dept: CARDIOLOGY | Age: 70
Discharge: HOME | End: 2020-09-29
Payer: MEDICARE

## 2020-09-29 VITALS
BODY MASS INDEX: 29.03 KG/M2 | HEIGHT: 69 IN | HEART RATE: 56 BPM | SYSTOLIC BLOOD PRESSURE: 128 MMHG | RESPIRATION RATE: 16 BRPM | DIASTOLIC BLOOD PRESSURE: 84 MMHG | WEIGHT: 196 LBS

## 2020-09-29 LAB
LV EF: 65 %
LVEF MODALITY: NORMAL

## 2020-09-29 PROCEDURE — 3017F COLORECTAL CA SCREEN DOC REV: CPT | Performed by: INTERNAL MEDICINE

## 2020-09-29 PROCEDURE — 93000 ELECTROCARDIOGRAM COMPLETE: CPT | Performed by: INTERNAL MEDICINE

## 2020-09-29 PROCEDURE — 4040F PNEUMOC VAC/ADMIN/RCVD: CPT | Performed by: INTERNAL MEDICINE

## 2020-09-29 PROCEDURE — 93306 TTE W/DOPPLER COMPLETE: CPT | Performed by: PSYCHIATRY & NEUROLOGY

## 2020-09-29 PROCEDURE — G8417 CALC BMI ABV UP PARAM F/U: HCPCS | Performed by: INTERNAL MEDICINE

## 2020-09-29 PROCEDURE — G8427 DOCREV CUR MEDS BY ELIG CLIN: HCPCS | Performed by: INTERNAL MEDICINE

## 2020-09-29 PROCEDURE — 4004F PT TOBACCO SCREEN RCVD TLK: CPT | Performed by: INTERNAL MEDICINE

## 2020-09-29 PROCEDURE — 99215 OFFICE O/P EST HI 40 MIN: CPT | Performed by: INTERNAL MEDICINE

## 2020-09-29 PROCEDURE — 1111F DSCHRG MED/CURRENT MED MERGE: CPT | Performed by: INTERNAL MEDICINE

## 2020-09-29 PROCEDURE — 1123F ACP DISCUSS/DSCN MKR DOCD: CPT | Performed by: INTERNAL MEDICINE

## 2020-09-29 NOTE — PROGRESS NOTES
visit. No Known Allergies    Vitals:    09/29/20 0859   BP: 128/84   Pulse: 56   Resp: 16   Weight: 196 lb (88.9 kg)   Height: 5' 9\" (1.753 m)       Social History     Socioeconomic History    Marital status: Single     Spouse name: Not on file    Number of children: Not on file    Years of education: Not on file    Highest education level: Not on file   Occupational History    Not on file   Social Needs    Financial resource strain: Not on file    Food insecurity     Worry: Not on file     Inability: Not on file    Transportation needs     Medical: Not on file     Non-medical: Not on file   Tobacco Use    Smoking status: Heavy Tobacco Smoker     Packs/day: 1.00     Years: 30.00     Pack years: 30.00     Types: Cigars, Cigarettes     Start date: 9/3/1969    Smokeless tobacco: Never Used    Tobacco comment: smoked about 1 ppd cigarettes for about 10-15 yrs, cigars about 5/week for past 20+ yrs   Substance and Sexual Activity    Alcohol use:  Yes     Alcohol/week: 8.0 standard drinks     Types: 8 Cans of beer per week     Frequency: 4 or more times a week     Drinks per session: 1 or 2     Binge frequency: Never     Comment: coiple a day sometimes     Drug use: Never    Sexual activity: Not Currently   Lifestyle    Physical activity     Days per week: Not on file     Minutes per session: Not on file    Stress: Not on file   Relationships    Social connections     Talks on phone: Not on file     Gets together: Not on file     Attends Jewish service: Not on file     Active member of club or organization: Not on file     Attends meetings of clubs or organizations: Not on file     Relationship status: Not on file    Intimate partner violence     Fear of current or ex partner: Not on file     Emotionally abused: Not on file     Physically abused: Not on file     Forced sexual activity: Not on file   Other Topics Concern    Not on file   Social History Narrative    Not on file       Family History   Problem Relation Age of Onset    COPD Mother          SUBJECTIVE: Endy Gill presents to the office today for followup of chronic cardiac diagnoses and hfu.  I reviewed hospital records as well as today's echo. Since our last visit he had a cath which showed minimal disease, and an echo that showed only mild PHTN. Subsequently presented with severe SOB and was found to have a submassive PE treated with EKOS by dr. Nathan Prado.  Echo at that time showed severe RV enlargement and dysfunction and RVSP > 100. He complains of no more dyspnea and denies   chest pain, chest pressure/discomfort, claudication, exertional chest pressure/discomfort, fatigue, irregular heart beat, lower extremity edema, near-syncope, orthopnea, palpitations, paroxysmal nocturnal dyspnea, syncope and tachypnea. On new BP regimen, with home readings [id number] systolic. Review of Systems:   Heart: as above   Lungs: as above   Eyes: denies changes in vision or discharge. Ears: denies changes in hearing or pain. Nose: denies epistaxis or masses   Throat: denies sore throat or trouble swallowing. Neuro: denies numbness, tingling, tremors. Skin: denies rashes or itching. : denies hematuria, dysuria   GI: denies vomiting, diarrhea   Psych: denies mood changed, anxiety, depression. all others negative. Physical Exam   /84   Pulse 56   Resp 16   Ht 5' 9\" (1.753 m)   Wt 196 lb (88.9 kg)   BMI 28.94 kg/m²   Constitutional: Oriented to person, place, and time. Well-developed and well-nourished. No distress. Head: Normocephalic and atraumatic. Eyes: EOM are normal. Pupils are equal, round, and reactive to light. Neck: Normal range of motion. Neck supple. No hepatojugular reflux and no JVD present. Carotid bruit is not present. No tracheal deviation present. No thyromegaly present. Cardiovascular: Normal rate, regular rhythm, normal heart sounds and intact distal pulses.   Exam reveals no gallop and no friction rub. No murmur heard. Pulmonary/Chest: Effort normal and breath sounds normal. No respiratory distress. No wheezes. No rales. No tenderness. Abdominal: Soft. Bowel sounds are normal. No distension and no mass. No tenderness. No rebound and no guarding. Musculoskeletal: Normal range of motion. No edema and no tenderness. Neurological: Alert and oriented to person, place, and time. Skin: Skin is warm and dry. No rash noted. Not diaphoretic. No erythema. Psychiatric: Normal mood and affect.  Behavior is normal.     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

## 2020-10-01 RX ORDER — LISINOPRIL 30 MG/1
30 TABLET ORAL DAILY
Qty: 30 TABLET | Refills: 5 | Status: SHIPPED
Start: 2020-10-01 | End: 2021-01-06 | Stop reason: SDUPTHER

## 2020-10-01 RX ORDER — METOPROLOL SUCCINATE 50 MG/1
50 TABLET, EXTENDED RELEASE ORAL DAILY
Qty: 30 TABLET | Refills: 5 | Status: SHIPPED
Start: 2020-10-01 | End: 2021-01-06 | Stop reason: SDUPTHER

## 2020-10-05 ENCOUNTER — OFFICE VISIT (OUTPATIENT)
Dept: VASCULAR SURGERY | Age: 70
End: 2020-10-05
Payer: MEDICARE

## 2020-10-05 VITALS — BODY MASS INDEX: 29.03 KG/M2 | HEIGHT: 69 IN | WEIGHT: 196 LBS | RESPIRATION RATE: 16 BRPM

## 2020-10-05 PROCEDURE — 99213 OFFICE O/P EST LOW 20 MIN: CPT | Performed by: PHYSICIAN ASSISTANT

## 2020-10-05 PROCEDURE — 1123F ACP DISCUSS/DSCN MKR DOCD: CPT | Performed by: SURGERY

## 2020-10-05 PROCEDURE — G8484 FLU IMMUNIZE NO ADMIN: HCPCS | Performed by: SURGERY

## 2020-10-05 PROCEDURE — G8417 CALC BMI ABV UP PARAM F/U: HCPCS | Performed by: SURGERY

## 2020-10-05 PROCEDURE — G8427 DOCREV CUR MEDS BY ELIG CLIN: HCPCS | Performed by: SURGERY

## 2020-10-05 PROCEDURE — 3017F COLORECTAL CA SCREEN DOC REV: CPT | Performed by: SURGERY

## 2020-10-05 PROCEDURE — 4040F PNEUMOC VAC/ADMIN/RCVD: CPT | Performed by: SURGERY

## 2020-10-05 PROCEDURE — 4004F PT TOBACCO SCREEN RCVD TLK: CPT | Performed by: SURGERY

## 2020-10-05 PROCEDURE — 1111F DSCHRG MED/CURRENT MED MERGE: CPT | Performed by: SURGERY

## 2020-10-05 NOTE — PROGRESS NOTES
10/5/2020   Vascular Surgery Outpatient Followup    PCP : Yanira Walters, DO    Previous Procedures  09/02/2020 EKOS catheter placement in b/l pulm arteries    HISTORY OF PRESENT ILLNESS:      Patient returns for post operative evaluation s/p EKOS lysis of b/l PEs. It was done for acute submassive b/l pulmonary embolus and L LE DVT. Patient has no more complaints of shortness of breath. He is able to walk without problem. Overall patient is feeling better than when first admitted to hospital with this issue and even thinks he feels better than his baseline. The patient denies any unexpected problems since the procedure.        PHYSICAL EXAM:    CONSTITUTIONAL:   Awake, alert, cooperative  PSYCHIATRIC :  Oriented to time, place and person     Appropriate insight to disease process  EYES: Lids and lashes normal  ENT:  External ears and nose without lesions   Hearing deficits present - hearing aids in place  NECK: Supple, symmetrical, trachea midline  LUNGS:  No increased work of breathing                 Clear to auscultation  CARDIOVASCULAR:  regular rate and rhythm   ABDOMEN:  soft, non-distended, non-tender  SKIN:   Normal skin color   Texture and turgor normal, no induration  EXTREMITIES:   R LE  Edema absent   Femoral puncture site is soft without evidence of infection or hematoma  L LE Edema absent    Past Medical History:        Diagnosis Date    Anxiety 6/5/2019    [facility] obstructive [facility] disease) [facility]     Essential hypertension 12/5/2017    Hx of blood clots 09/2020    PEs    Mixed hyperlipidemia 6/5/2019    SOB (shortness of breath)     Ulcerative colitis ([facility] 6/5/2019     Past Surgical History:        Procedure Laterality Date    COLONOSCOPY      DENTAL SURGERY      extraction    PULMONARY EMBOLISM LYSIS  09/02/2020    Dr Thuan Mcdonald     Current Medications:   Current Outpatient Medications   Medication Sig Dispense Refill    apixaban (ELIQUIS) 5 MG TABS tablet Take 1 tablet by mouth 2 times daily Taper to 5mg bid after initial 7-days of 10mg [2-tab 5mg] bid 60 tablet 5    lisinopril (PRINIVIL;ZESTRIL) 30 MG tablet Take 1 tablet by mouth daily 30 tablet 5    metoprolol succinate (TOPROL XL) 50 MG extended release tablet Take 1 tablet by mouth daily 30 tablet 5    spironolactone (ALDACTONE) 25 MG tablet Take 1 tablet by mouth daily 30 tablet 3    Elastic Bandages & Supports (JOBST KNEE HIGH COMPRESSION ) MISC Dx: Varicose veins of the legs with pain and swelling. Bilateral thigh-high 20-30 mm Hg compression stockings. 2 each 5    atorvastatin (LIPITOR) 10 MG tablet Take 1 tablet by mouth daily 90 tablet 3    sertraline (ZOLOFT) 100 MG tablet Take 1 tablet by mouth daily 90 tablet 3    tamsulosin (FLOMAX) 0.4 MG capsule Take 0.4 mg by mouth daily      folic acid (FOLVITE) 1 MG tablet Take 1 tablet by mouth daily 90 tablet 3    LIALDA 1.2 g EC tablet Take 1 tablet by mouth 3 times daily (Patient taking differently: Take 3.6 g by mouth daily ) 270 tablet 3    mercaptopurine (PURINETHOL) 50 MG chemo tablet Take 1 tablet by mouth 2 times daily (Patient taking differently: Take 100 mg by mouth daily ) 180 tablet 3     No current facility-administered medications for this visit. Allergies:  Patient has no known allergies.   Social History     Socioeconomic History    Marital status: Single     Spouse name: Not on file    Number of children: Not on file    Years of education: Not on file    Highest education level: Not on file   Occupational History    Not on file   Social Needs    Financial resource strain: Not on file    Food insecurity     Worry: Not on file     Inability: Not on file    Transportation needs     Medical: Not on file     Non-medical: Not on file   Tobacco Use    Smoking status: Heavy Tobacco Smoker     Packs/day: 1.00     Years: 30.00     Pack years: 30.00     Types: Cigars, Cigarettes     Start date: 9/3/1969    Smokeless tobacco: Never Used   Alexandra Mosquera Tobacco comment: smoked about 1 ppd cigarettes for about 10-15 yrs, cigars about 5/week for past 20+ yrs   Substance and Sexual Activity    Alcohol use:  Yes     Alcohol/week: 8.0 standard drinks     Types: 8 Cans of beer per week     Frequency: 4 or more times a week     Drinks per session: 1 or 2     Binge frequency: Never     Comment: coiple a day sometimes     Drug use: Never    Sexual activity: Not Currently   Lifestyle    Physical activity     Days per week: Not on file     Minutes per session: Not on file    Stress: Not on file   Relationships    Social connections     Talks on phone: Not on file     Gets together: Not on file     Attends Mormonism service: Not on file     Active member of club or organization: Not on file     Attends meetings of clubs or organizations: Not on file     Relationship status: Not on file    Intimate partner violence     Fear of current or ex partner: Not on file     Emotionally abused: Not on file     Physically abused: Not on file     Forced sexual activity: Not on file   Other Topics Concern    Not on file   Social History Narrative    Not on file     Family History   Problem Relation Age of Onset    COPD Mother          Lab Results   Component Value Date    WBC 4.8 09/05/2020    HGB 14.7 09/05/2020    HCT 42.7 09/05/2020     09/05/2020    PROTIME 12.7 (H) 08/10/2020    INR 1.1 08/10/2020    APTT 50.1 (H) 09/03/2020    K 4.0 09/05/2020    BUN 15 09/05/2020    CREATININE 0.9 09/05/2020     A/P S/P Pulmonary Embolism Lysis    Hx of L LE DVT  · pt has had complete resolution of their symptoms  · pt does not have residual shortness of breath at this time  · Echocardiogram reviewed post-procedure  · RV Function is normal  · RV Dilation is not present  · RVSP is 41 mm hg  · CT of the Chest reviewed   · No notable PE although this was a noncontrasted study   · pt is currently on eliquis for anticoagulation  · Therapeutic anticoagulation lifelong per heme  · Elevate bilateral LE while in bed or sitting  · Compression stockings PRN for leg swelling or pain  · Pt never had any swelling or pain associated with DVT  · F/U PRN as patient will be chronically anticoagulated so there is no indication for repeat LLE venous US  · Call if patient develops worsening of swelling or wounds  · discussed with patient pathophysiology of DVT, PE, and thrombophlebitis   · All ?s answered    Pt seen and plan reviewed with Dr. Sridevi San.      Zan Holm PA-C

## 2020-10-07 ENCOUNTER — OFFICE VISIT (OUTPATIENT)
Dept: PRIMARY CARE CLINIC | Age: 70
End: 2020-10-07
Payer: MEDICARE

## 2020-10-07 VITALS
OXYGEN SATURATION: 97 % | SYSTOLIC BLOOD PRESSURE: 120 MMHG | HEART RATE: 56 BPM | DIASTOLIC BLOOD PRESSURE: 80 MMHG | WEIGHT: 195 LBS | BODY MASS INDEX: 28.8 KG/M2 | TEMPERATURE: 97.7 F

## 2020-10-07 PROBLEM — Z23 FLU VACCINE NEED: Status: ACTIVE | Noted: 2020-10-07

## 2020-10-07 PROCEDURE — 3017F COLORECTAL CA SCREEN DOC REV: CPT | Performed by: INTERNAL MEDICINE

## 2020-10-07 PROCEDURE — G0008 ADMIN INFLUENZA VIRUS VAC: HCPCS | Performed by: INTERNAL MEDICINE

## 2020-10-07 PROCEDURE — 1123F ACP DISCUSS/DSCN MKR DOCD: CPT | Performed by: INTERNAL MEDICINE

## 2020-10-07 PROCEDURE — 90653 IIV ADJUVANT VACCINE IM: CPT | Performed by: INTERNAL MEDICINE

## 2020-10-07 PROCEDURE — G8482 FLU IMMUNIZE ORDER/ADMIN: HCPCS | Performed by: INTERNAL MEDICINE

## 2020-10-07 PROCEDURE — 4040F PNEUMOC VAC/ADMIN/RCVD: CPT | Performed by: INTERNAL MEDICINE

## 2020-10-07 PROCEDURE — G0439 PPPS, SUBSEQ VISIT: HCPCS | Performed by: INTERNAL MEDICINE

## 2020-10-07 ASSESSMENT — LIFESTYLE VARIABLES
HOW OFTEN DURING THE LAST YEAR HAVE YOU FOUND THAT YOU WERE NOT ABLE TO STOP DRINKING ONCE YOU HAD STARTED: 0
HOW OFTEN DO YOU HAVE SIX OR MORE DRINKS ON ONE OCCASION: 0
HOW OFTEN DO YOU HAVE SIX OR MORE DRINKS ON ONE OCCASION: NEVER
HOW OFTEN DURING THE LAST YEAR HAVE YOU HAD A FEELING OF GUILT OR REMORSE AFTER DRINKING: 0
HOW OFTEN DURING THE LAST YEAR HAVE YOU FAILED TO DO WHAT WAS NORMALLY EXPECTED FROM YOU BECAUSE OF DRINKING: 0
HAS A RELATIVE, FRIEND, DOCTOR, OR ANOTHER HEALTH PROFESSIONAL EXPRESSED CONCERN ABOUT YOUR DRINKING OR SUGGESTED YOU CUT DOWN: 0
AUDIT TOTAL SCORE: 1
HOW OFTEN DO YOU HAVE A DRINK CONTAINING ALCOHOL: 1
HOW OFTEN DURING THE LAST YEAR HAVE YOU FAILED TO DO WHAT WAS NORMALLY EXPECTED FROM YOU BECAUSE OF DRINKING: NEVER
HOW MANY STANDARD DRINKS CONTAINING ALCOHOL DO YOU HAVE ON A TYPICAL DAY: ONE OR TWO
HOW OFTEN DURING THE LAST YEAR HAVE YOU BEEN UNABLE TO REMEMBER WHAT HAPPENED THE NIGHT BEFORE BECAUSE YOU HAD BEEN DRINKING: 0
HOW OFTEN DURING THE LAST YEAR HAVE YOU FOUND THAT YOU WERE NOT ABLE TO STOP DRINKING ONCE YOU HAD STARTED: NEVER
HOW OFTEN DURING THE LAST YEAR HAVE YOU NEEDED AN ALCOHOLIC DRINK FIRST THING IN THE MORNING TO GET YOURSELF GOING AFTER A NIGHT OF HEAVY DRINKING: NEVER
HAVE YOU OR SOMEONE ELSE BEEN INJURED AS A RESULT OF YOUR DRINKING: NO
HOW MANY STANDARD DRINKS CONTAINING ALCOHOL DO YOU HAVE ON A TYPICAL DAY: 0
AUDIT-C TOTAL SCORE: 1
HOW OFTEN DURING THE LAST YEAR HAVE YOU BEEN UNABLE TO REMEMBER WHAT HAPPENED THE NIGHT BEFORE BECAUSE YOU HAD BEEN DRINKING: NEVER
AUDIT-C TOTAL SCORE: 0
AUDIT TOTAL SCORE: 0
HAVE YOU OR SOMEONE ELSE BEEN INJURED AS A RESULT OF YOUR DRINKING: 0
HOW OFTEN DO YOU HAVE A DRINK CONTAINING ALCOHOL: MONTHLY OR LESS
HAS A RELATIVE, FRIEND, DOCTOR, OR ANOTHER HEALTH PROFESSIONAL EXPRESSED CONCERN ABOUT YOUR DRINKING OR SUGGESTED YOU CUT DOWN: NO
HOW OFTEN DURING THE LAST YEAR HAVE YOU HAD A FEELING OF GUILT OR REMORSE AFTER DRINKING: NEVER
HOW OFTEN DURING THE LAST YEAR HAVE YOU NEEDED AN ALCOHOLIC DRINK FIRST THING IN THE MORNING TO GET YOURSELF GOING AFTER A NIGHT OF HEAVY DRINKING: 0

## 2020-10-07 ASSESSMENT — PATIENT HEALTH QUESTIONNAIRE - PHQ9
SUM OF ALL RESPONSES TO PHQ9 QUESTIONS 1 & 2: 0
SUM OF ALL RESPONSES TO PHQ QUESTIONS 1-9: 0
2. FEELING DOWN, DEPRESSED OR HOPELESS: 0
SUM OF ALL RESPONSES TO PHQ QUESTIONS 1-9: 0
1. LITTLE INTEREST OR PLEASURE IN DOING THINGS: 0

## 2020-10-07 NOTE — PROGRESS NOTES
Medicare Annual Wellness Visit  Name: Facundo Burk Date: 10/7/2020   MRN: [id number] Sex: Male   Age: 77 y.o. Ethnicity: Non-/Non    : 1950 Race: Ted Mckeon is here for Medicare AWV    Screenings for behavioral, psychosocial and functional/safety risks, and cognitive dysfunction are all negative except as indicated below. These results, as well as other patient data from the [address] form, are documented in Flowsheets linked to this Encounter. No Known Allergies    Prior to Visit Medications    Medication Sig Taking? Authorizing Provider   apixaban (ELIQUIS) 5 MG TABS tablet Take 1 tablet by mouth 2 times daily Taper to 5mg bid after initial 7-days of 10mg [2-tab 5mg] bid Yes Mary Hogan DO   lisinopril (PRINIVIL;ZESTRIL) 30 MG tablet Take 1 tablet by mouth daily Yes Mary Hogan DO   metoprolol succinate (TOPROL XL) 50 MG extended release tablet Take 1 tablet by mouth daily Yes Mary Hogan DO   spironolactone (ALDACTONE) 25 MG tablet Take 1 tablet by mouth daily Yes Casey Lozano MD   Elastic Bandages & Supports (JOBST KNEE HIGH COMPRESSION SM) MISC Dx: Varicose veins of the legs with pain and swelling. Bilateral thigh-high 20-30 mm Hg compression stockings.  Yes GRACE Velasquez CNP   atorvastatin (LIPITOR) 10 MG tablet Take 1 tablet by mouth daily Yes Mary Hogan DO   sertraline (ZOLOFT) 100 MG tablet Take 1 tablet by mouth daily Yes Mary Hogan DO   tamsulosin (FLOMAX) 0.4 MG capsule Take 0.4 mg by mouth daily Yes Historical Provider, MD   folic acid (FOLVITE) 1 MG tablet Take 1 tablet by mouth daily Yes Mary Hogan DO   LIALDA 1.2 g EC tablet Take 1 tablet by mouth 3 times daily  Patient taking differently: Take 3.6 g by mouth daily  Yes Mary Hogan DO   mercaptopurine (PURINETHOL) 50 MG chemo tablet Take 1 tablet by mouth 2 times daily  Patient taking differently: Take 100 mg by mouth daily  Yes Zuleima Nathan DO Past Medical History:   Diagnosis Date    Anxiety 6/5/2019    COPD (chronic obstructive pulmonary disease) (HCC)     Essential hypertension 12/5/2017    Hx of blood clots 09/2020    PEs    Mixed hyperlipidemia 6/5/2019    SOB (shortness of breath)     Ulcerative colitis ([facility] 6/5/2019       Past Surgical History:   Procedure Laterality Date    COLONOSCOPY      DENTAL SURGERY      extraction    PULMONARY EMBOLISM LYSIS  09/02/2020    Dr Ken Shine       Family History   Problem Relation Age of Onset    COPD Mother        CareTeam (Including outside providers/suppliers regularly involved in providing care):   Patient Care Team:  Lavon Gallo DO as PCP - General (Internal Medicine)  Lavon Gallo DO as PCP - [facility] Empaneled Provider  Adonis Serna MD as Consulting Physician (Cardiology)  Hina Harrison MD as Consulting Physician (Pulmonary Disease)  Tamara Weldon MD as Consulting Physician (Hematology and Oncology)    Wt Readings from Last 3 Encounters:   10/07/20 195 lb (88.5 kg)   10/05/20 196 lb (88.9 kg)   09/29/20 196 lb (88.9 kg)     Vitals:    10/07/20 0741   BP: 120/80   Pulse: 56   Temp: 97.7 °F (36.5 °C)   TempSrc: Temporal   SpO2: 97%   Weight: 195 lb (88.5 kg)     Body mass index is 28.8 kg/m². Based upon direct observation of the patient, evaluation of cognition reveals recent and remote memory intact. Patient is DURABLE POWER OF  for healthcare and living will. Encouraged to bring both of them in so we can scan them into his chart. Patient's complete Health Risk Assessment and screening values have been reviewed and are found in Flowsheets. The following problems were reviewed today and where indicated follow up appointments were made and/or referrals ordered.     Positive Risk Factor Screenings with Interventions:     Substance History:  Social History     Tobacco History     Smoking Status  Heavy Tobacco Smoker Smoking Start Date  9/3/1969 Smoking Aged Out    Hib vaccine  Aged Out    Meningococcal (ACWY) vaccine  Aged Out     Recommendations for Pharmly Due: see orders and patient instructions/AVS.  . Recommended screening schedule for the next 5-10 years is provided to the patient in written form: see Patient Instructions/AVS.    There are no diagnoses linked to this encounter. Cardiovascular Disease Risk Counseling: Assessed the patient's risk to develop cardiovascular disease and reviewed main risk factors. Reviewed steps to reduce disease risk including:   · Quitting tobacco use, reducing amount smoked, or not starting the habit  · Making healthy food choices  · Being physically active and gradualy increasing activity levels   · Reduce weight and determine a healthy BMI goal  · Monitor blood pressure and treat if higher than 140/90 mmHg  · Maintain blood total cholesterol levels under 5 mmol/l or 190 mg/dl  · Maintain LDL cholesterol levels under 3.0 mmol/l or 115 mg/dl   · Control blood glucose levels  · Consider taking aspirin (75 mg daily), once blood pressure is controlled   Provided a follow up plan. Time spent (minutes): 5  Tobacco Cessation Counseling: Patient advised about behavior change, including information about personal health harms, usage of appropriate cessation measures and benefits of cessation.   Time spent (minutes): 5

## 2020-10-07 NOTE — PATIENT INSTRUCTIONS
Learning About Benefits From Quitting Smoking  How does quitting smoking make you healthier? If you're thinking about quitting smoking, you may have a few reasons to be smoke-free. Your health may be one of them. · When you quit smoking, you lower your risks for cancer, lung disease, heart attack, stroke, blood vessel disease, and blindness from macular degeneration. · When you're smoke-free, you get sick less often, and you heal faster. You are less likely to get colds, flu, bronchitis, and pneumonia. · As a nonsmoker, you may find that your mood is better and you are less stressed. When and how will you feel healthier? Quitting has real health benefits that start from day 1 of being smoke-free. And the longer you stay smoke-free, the healthier you get and the better you feel. The first hours  · After just 20 minutes, your blood pressure and heart rate go down. That means there's less stress on your heart and blood vessels. · Within 12 hours, the level of carbon monoxide in your blood drops back to normal. That makes room for more oxygen. With more oxygen in your body, you may notice that you have more energy than when you smoked. After 2 weeks  · Your lungs start to work better. · Your risk of heart attack starts to drop. After 1 month  · When your lungs are clear, you cough less and breathe deeper, so it's easier to be active. · Your sense of taste and smell return. That means you can enjoy food more than you have since you started smoking. Over the years  · Over the years, your risks of heart disease, heart attack, and stroke are lower. · After 10 years, your risk of dying from lung cancer is cut by about half. And your risk for many other types of cancer is lower too. How would quitting help others in your life? When you quit smoking, you improve the health of everyone who now breathes in your smoke. · Their heart, lung, and cancer risks drop, much like yours. · They are sick less. For babies and small children, living smoke-free means they're less likely to have ear infections, pneumonia, and bronchitis. · If you're a woman who is or will be pregnant someday, quitting smoking means a healthier . · Children who are close to you are less likely to become adult smokers. Where can you learn more? Go to https://chpepiceweb.[employer]. org and sign in to your [employer] account. Enter [id number] [id number] [phone] in the [employer] box to learn more about \"Learning About Benefits From Quitting Smoking. \"     If you do not have an account, please click on the \"Sign Up Now\" link. Current as of: 2020               Content Version: 12.6  © 4855-4417 [employer]. Care instructions adapted under license by [facility] ([facility]). If you have questions about a medical condition or this instruction, always ask your healthcare professional. [place] [address] any warranty or liability for your use of this information. Personalized Preventive Plan for Liam Cote - 10/7/2020  Medicare offers a range of preventive health benefits. Some of the tests and screenings are paid in full while other may be subject to a deductible, co-insurance, and/or copay. Some of these benefits include a comprehensive review of your medical history including lifestyle, illnesses that may run in your family, and various assessments and screenings as appropriate. After reviewing your medical record and screening and assessments performed today your provider may have ordered immunizations, labs, imaging, and/or referrals for you. A list of these orders (if applicable) as well as your Preventive Care list are included within your After Visit Summary for your review. Other Preventive Recommendations:    · A preventive eye exam performed by an eye specialist is recommended every 1-2 years to screen for glaucoma; cataracts, macular degeneration, and other eye disorders.   · A preventive dental visit is recommended every 6 months. · Try to get at least 150 minutes of exercise per week or 10,000 steps per day on a pedometer . · Order or download the FREE \"Exercise & Physical Activity: Your Everyday Guide\" from [employer]. Call 4-137.504.5736 or search [employer] online. · You need 7383-2661 mg of calcium and 8478-4418 IU of vitamin D per day. It is possible to meet your calcium requirement with diet alone, but a vitamin D supplement is usually necessary to meet this goal.  · When exposed to the sun, use a sunscreen that protects against both UVA and UVB radiation with an SPF of 30 or greater. Reapply every 2 to 3 hours or after sweating, drying off with a towel, or swimming. · Always wear a seat belt when traveling in a car. Always wear a helmet when riding a bicycle or motorcycle.

## 2020-10-15 ENCOUNTER — CARE COORDINATION (OUTPATIENT)
Dept: CARE COORDINATION | Age: 70
End: 2020-10-15

## 2020-10-15 NOTE — CARE COORDINATION
Call #1    Attempted outreach to offer enrollment into Care Coordination. Left message with contact information requesting call back.     PLAN    Call #2, offer enrollment

## 2020-10-26 ENCOUNTER — CARE COORDINATION (OUTPATIENT)
Dept: CARE COORDINATION | Age: 70
End: 2020-10-26

## 2020-10-26 NOTE — CARE COORDINATION
Call #2    Attempted outreach to offer enrollment into Care Coordination. Left message with contact information requesting call back.     PLAN    Call #3, offer enrollment

## 2020-10-27 ENCOUNTER — CARE COORDINATION (OUTPATIENT)
Dept: CARE COORDINATION | Age: 70
End: 2020-10-27

## 2020-12-21 ENCOUNTER — TELEPHONE (OUTPATIENT)
Dept: PRIMARY CARE CLINIC | Age: 70
End: 2020-12-21

## 2020-12-21 PROBLEM — Z79.01 CHRONIC ANTICOAGULATION: Status: ACTIVE | Noted: 2020-12-21

## 2020-12-21 NOTE — TELEPHONE ENCOUNTER
Patient would like to you place his [employer] in and fax it to [facility] before his apt in Jan. Thank you

## 2020-12-23 LAB
A/G RATIO: 1.2 RATIO (ref 1.1–2.2)
ALBUMIN SERPL-MCNC: 3.8 G/DL (ref 3.4–4.8)
ALP BLD-CCNC: 43 U/L (ref 42–121)
ALT SERPL-CCNC: 21 U/L (ref 10–63)
ANION GAP SERPL CALCULATED.3IONS-SCNC: 8 MEQ/L (ref 3–11)
AST SERPL-CCNC: 20 U/L (ref 10–41)
BASOPHILS ABSOLUTE: 0 K/UL (ref 0–0.2)
BASOPHILS RELATIVE PERCENT: 0.5 % (ref 0–1.5)
BILIRUB SERPL-MCNC: 0.7 MG/DL (ref 0.3–1.5)
BUN BLDV-MCNC: 18 MG/DL (ref 6–20)
CALCIUM SERPL-MCNC: 8.8 MG/DL (ref 8.5–10.5)
CHLORIDE BLD-SCNC: 107 MEQ/L (ref 98–107)
CHOLESTEROL: 160 MG/DL (ref 140–200)
CO2: 23 MEQ/L (ref 21–31)
CREAT SERPL-MCNC: 1.1 MG/DL (ref 0.6–1.3)
CREATININE + EGFR PANEL: 80 ML/MIN
EOSINOPHILS ABSOLUTE: 0.1 K/UL (ref 0–0.33)
EOSINOPHILS RELATIVE PERCENT: 2 % (ref 0–3)
GFR NON-AFRICAN AMERICAN: 66 ML/MIN
GLOBULIN: 3.1 G/DL (ref 1.9–3.9)
GLUCOSE BLD-MCNC: 101 MG/DL (ref 70–99)
HCT VFR BLD CALC: 42.3 % (ref 41–50)
HDLC SERPL-MCNC: 54 MG/DL (ref 29–71)
HEMOGLOBIN: 14.1 G/DL (ref 13.5–16.5)
LDL CHOLESTEROL: 91 MG/DL
LDL/HDL RATIO: 1.7 RATIO
LYMPHOCYTES ABSOLUTE: 1.1 K/UL (ref 1.1–4.8)
LYMPHOCYTES RELATIVE PERCENT: 23.8 % (ref 24–44)
MCH RBC QN AUTO: 32.9 PG (ref 28–34)
MCHC RBC AUTO-ENTMCNC: 33.4 G/DL (ref 33–37)
MCV RBC AUTO: 98.5 FL (ref 80–100)
MONOCYTES ABSOLUTE: 0.4 K/UL (ref 0.2–0.7)
MONOCYTES RELATIVE PERCENT: 8 % (ref 3.4–9)
NEUTROPHILS ABSOLUTE: 3 K/UL (ref 1.83–8.7)
PDW BLD-RTO: 15.1 % (ref 10.9–14.3)
PLATELET # BLD: 232 K/UL (ref 150–450)
PMV BLD AUTO: 7.7 FL (ref 7.4–10.4)
POTASSIUM SERPL-SCNC: 4 MEQ/L (ref 3.6–5)
RBC # BLD: 4.29 M/UL (ref 4.5–5.5)
SEGMENTED NEUTROPHILS RELATIVE PERCENT: 65.7 % (ref 40–74)
SODIUM BLD-SCNC: 138 MEQ/L (ref 135–145)
TOTAL PROTEIN: 6.9 G/DL (ref 5.9–7.8)
TRIGL SERPL-MCNC: 69 MG/DL (ref 41–189)
WBC # BLD: 4.6 K/UL (ref 4.5–11)

## 2021-01-06 ENCOUNTER — OFFICE VISIT (OUTPATIENT)
Dept: PRIMARY CARE CLINIC | Age: 71
End: 2021-01-06
Payer: MEDICARE

## 2021-01-06 VITALS
HEART RATE: 63 BPM | SYSTOLIC BLOOD PRESSURE: 140 MMHG | DIASTOLIC BLOOD PRESSURE: 90 MMHG | TEMPERATURE: 97.8 F | BODY MASS INDEX: 29.98 KG/M2 | WEIGHT: 203 LBS

## 2021-01-06 DIAGNOSIS — F17.200 TOBACCO DEPENDENCE: ICD-10-CM

## 2021-01-06 DIAGNOSIS — R35.0 BENIGN PROSTATIC HYPERPLASIA WITH URINARY FREQUENCY: ICD-10-CM

## 2021-01-06 DIAGNOSIS — K51.80 OTHER ULCERATIVE COLITIS WITHOUT COMPLICATION (HCC): ICD-10-CM

## 2021-01-06 DIAGNOSIS — N40.1 BENIGN PROSTATIC HYPERPLASIA WITH URINARY FREQUENCY: ICD-10-CM

## 2021-01-06 DIAGNOSIS — F41.9 ANXIETY: ICD-10-CM

## 2021-01-06 DIAGNOSIS — I26.99 PULMONARY EMBOLISM, BILATERAL (HCC): ICD-10-CM

## 2021-01-06 DIAGNOSIS — J44.9 CHRONIC OBSTRUCTIVE PULMONARY DISEASE, UNSPECIFIED COPD TYPE (HCC): ICD-10-CM

## 2021-01-06 DIAGNOSIS — I10 ESSENTIAL HYPERTENSION: Primary | ICD-10-CM

## 2021-01-06 DIAGNOSIS — E78.2 MIXED HYPERLIPIDEMIA: ICD-10-CM

## 2021-01-06 DIAGNOSIS — Z12.5 ENCOUNTER FOR SCREENING FOR MALIGNANT NEOPLASM OF PROSTATE: ICD-10-CM

## 2021-01-06 DIAGNOSIS — Z79.01 CHRONIC ANTICOAGULATION: ICD-10-CM

## 2021-01-06 DIAGNOSIS — R97.20 ELEVATED PSA: ICD-10-CM

## 2021-01-06 PROBLEM — Z23 FLU VACCINE NEED: Status: RESOLVED | Noted: 2020-10-07 | Resolved: 2021-01-06

## 2021-01-06 PROBLEM — R91.1 LUNG NODULE: Status: RESOLVED | Noted: 2020-09-09 | Resolved: 2021-01-06

## 2021-01-06 PROCEDURE — 3023F SPIROM DOC REV: CPT | Performed by: INTERNAL MEDICINE

## 2021-01-06 PROCEDURE — 3017F COLORECTAL CA SCREEN DOC REV: CPT | Performed by: INTERNAL MEDICINE

## 2021-01-06 PROCEDURE — 4040F PNEUMOC VAC/ADMIN/RCVD: CPT | Performed by: INTERNAL MEDICINE

## 2021-01-06 PROCEDURE — G8417 CALC BMI ABV UP PARAM F/U: HCPCS | Performed by: INTERNAL MEDICINE

## 2021-01-06 PROCEDURE — G8926 SPIRO NO PERF OR DOC: HCPCS | Performed by: INTERNAL MEDICINE

## 2021-01-06 PROCEDURE — G8482 FLU IMMUNIZE ORDER/ADMIN: HCPCS | Performed by: INTERNAL MEDICINE

## 2021-01-06 PROCEDURE — 99214 OFFICE O/P EST MOD 30 MIN: CPT | Performed by: INTERNAL MEDICINE

## 2021-01-06 PROCEDURE — 1123F ACP DISCUSS/DSCN MKR DOCD: CPT | Performed by: INTERNAL MEDICINE

## 2021-01-06 PROCEDURE — G8427 DOCREV CUR MEDS BY ELIG CLIN: HCPCS | Performed by: INTERNAL MEDICINE

## 2021-01-06 PROCEDURE — 4004F PT TOBACCO SCREEN RCVD TLK: CPT | Performed by: INTERNAL MEDICINE

## 2021-01-06 RX ORDER — ATORVASTATIN CALCIUM 10 MG/1
10 TABLET, FILM COATED ORAL DAILY
Qty: 90 TABLET | Refills: 1 | Status: SHIPPED
Start: 2021-01-06 | End: 2021-07-21 | Stop reason: SDUPTHER

## 2021-01-06 RX ORDER — MERCAPTOPURINE 50 MG/1
50 TABLET ORAL 2 TIMES DAILY
Qty: 180 TABLET | Refills: 1 | Status: SHIPPED
Start: 2021-01-06 | End: 2021-07-21 | Stop reason: SDUPTHER

## 2021-01-06 RX ORDER — FOLIC ACID 1 MG/1
1000 TABLET ORAL DAILY
Qty: 90 TABLET | Refills: 1 | Status: SHIPPED
Start: 2021-01-06 | End: 2021-07-21 | Stop reason: SDUPTHER

## 2021-01-06 RX ORDER — LISINOPRIL 30 MG/1
30 TABLET ORAL DAILY
Qty: 90 TABLET | Refills: 1 | Status: SHIPPED
Start: 2021-01-06 | End: 2021-07-21 | Stop reason: SDUPTHER

## 2021-01-06 RX ORDER — SERTRALINE HYDROCHLORIDE 100 MG/1
100 TABLET, FILM COATED ORAL DAILY
Qty: 90 TABLET | Refills: 1 | Status: SHIPPED
Start: 2021-01-06 | End: 2021-07-21 | Stop reason: SDUPTHER

## 2021-01-06 RX ORDER — METOPROLOL SUCCINATE 50 MG/1
50 TABLET, EXTENDED RELEASE ORAL DAILY
Qty: 90 TABLET | Refills: 1 | Status: SHIPPED
Start: 2021-01-06 | End: 2021-07-21 | Stop reason: SDUPTHER

## 2021-01-06 RX ORDER — MESALAMINE 1.2 G/1
1.2 TABLET, DELAYED RELEASE ORAL 3 TIMES DAILY
Qty: 270 TABLET | Refills: 1 | Status: SHIPPED
Start: 2021-01-06 | End: 2021-07-21 | Stop reason: SDUPTHER

## 2021-01-06 ASSESSMENT — ENCOUNTER SYMPTOMS
TROUBLE SWALLOWING: 0
PHOTOPHOBIA: 0
SHORTNESS OF BREATH: 1
STRIDOR: 0
COLOR CHANGE: 0
ABDOMINAL PAIN: 0
EYE ITCHING: 0
VOMITING: 0
ANAL BLEEDING: 0
EYE PAIN: 0
NAUSEA: 0
EYE DISCHARGE: 0
FACIAL SWELLING: 0
DIARRHEA: 0
SORE THROAT: 0
WHEEZING: 0
COUGH: 0
BLOOD IN STOOL: 0
CONSTIPATION: 0
RHINORRHEA: 0

## 2021-01-06 NOTE — ASSESSMENT & PLAN NOTE
Continue mercaptopurine and Lialda from [address] [place] [address].   Follow-up with him for his every 3 years colonoscopies

## 2021-01-06 NOTE — ASSESSMENT & PLAN NOTE
Diagnostic PSA ordered which patient will have done at [facility] and take the report to Dr. Cody Alejandro

## 2021-01-06 NOTE — ASSESSMENT & PLAN NOTE
Continue anticoagulation with Eliquis 5 mg twice daily. Watch for any signs of bleeding.   Follow-up with his vascular surgeon Dr. Arun Dejesus

## 2021-01-06 NOTE — ASSESSMENT & PLAN NOTE
Blood pressures are stable. Continue medications and monitor blood pressures at home. Call office if systolics are over 448 over diastolics over 90.

## 2021-01-06 NOTE — PROGRESS NOTES
2021    Name: Hao Clemente : 1950 Sex: male  Age: 72 y.o. Subjective:  Chief Complaint   Patient presents with    Hyperlipidemia    Hypertension        Hypertension  Associated symptoms include shortness of breath. Pertinent negatives include no chest pain, headaches or palpitations. Hyperlipidemia  Associated symptoms include shortness of breath. Pertinent negatives include no chest pain or myalgias. Patient presented to [facility] on 9 2 with acute onset of shortness of breath. He had a CTA which showed saddle embolus. He was then taken for catheterization and lysis of the embolus with thrombolytic agent. He was under the care of a vascular surgeon Dr. Sloan Shah. He also saw Dr. Edgar Martinez and and hematologist.   He had an echocardiogram and cardiac catheterization done by Dr. Vianney Cooney. Everything appeared to be okay. The repeat echocardiogram after his pulmonary embolus showed right ventricular strain. He had cor pulmonale and secondary pulmonary arterial arterial hypertension. He also had DVT of his lower extremities. He was started on Eliquis which will be increased to 10 mg twice daily after his loading dose    He had uncontrolled hypertension as well. Medications were adjusted, he is on lisinopril 30 mg daily metoprolol 50 mg daily spironolactone 25 mg daily medications reviewed and medication reconciliation done. He remains on mercaptopurine 50 mg twice daily Lialda 1.2 g 3 times daily Folvite thousand micrograms daily sertraline 100 mg daily tamsulosin 0.4 mg daily    He saw Dr. Kelley Prajapati his vascular surgeon in October and reviewed report. Prior to that he had a CTA which showed bilateral pulmonary emboli. There is a left lower lobe opacity measured measuring 3.6 x 3.4 cm. Follow-up CT recommended in 3 months. He also had a 7 mm nonobstructive left renal calculus. Repeat ultrasound of his lower extremities showed thrombus within the left popliteal and peroneal veins.   Echocardiogram repeated on 9/29/2020 showed normal left ventricle size with ejection fraction of 65%. The right ventricle structure and function were normal.  He had mild to moderate aortic regurgitation with right ventricular systolic pressure at 41 mm    Repeat CT without contrast done by his pulmonologist on September 23, 2020 showed resolution of the soft tissue density of the left lower lobe  . Patient has a history of ulcerative colitis under the care of Scooter Mejía He had a colonoscopy done in June 2019. This showed diverticuli and mild inflammation. No significant change from previous study. He continues on mercaptopurine and Lialda. Has a history of mildly increased PSA. He saw his urologist recently. PSA is slightly elevated at 5.18. His urologist passed away so he is looking for a new one. We will send him to Dr. Luis Angel Rosales Gilberto. He does complain of urgency and frequency during the day with some dribbling. Does not have nocturia. Has a little bit of difficulty starting his urinary stream in the morning. Rebekah Wills He saw Dr. Louisa Britton  who checked his PSA and started him on Flomax. Patient says the Flomax is not really helping his symptoms as well as he thought it should. I asked him to check with Dr. Timmy Nobles regarding the different medication. Repeat PSA done in June 2020 had increased slightly to 7.71. Repeat PSA in the hospital was about 6    Patient has a history of anxiety on Zoloft, hypertension, hyperlipidemia. He takes his medications as directed and has no problems. He has mild hyperglycemia with a fasting blood sugar of 112. We will check his A1c. This is in the hospital it was 6% which is consistent with the prediabetic state    He has benign tremor which improves when he drinks alcohol. He is on lisinopril for blood pressure I like to switch him over to a beta-blocker which should improve his tremor hopefully it will not worsen his breathing. Reviewed blood work done in June 2020. Lipids were good. Fasting blood sugar was 112 otherwise CMP was unremarkable his PSA was slightly elevated as above    Reviewed immunizations and he is up-to-date on all his immunizations. He will be scheduled for annual wellness visit on in the near future. Review of Systems   Constitutional: Negative for appetite change, fatigue and unexpected weight change. HENT: Negative for congestion, ear pain, facial swelling, rhinorrhea, sore throat, tinnitus and trouble swallowing. Eyes: Negative for photophobia, pain, discharge, itching and visual disturbance. Respiratory: Positive for shortness of breath. Negative for cough, wheezing and stridor. See HPI   Cardiovascular: Negative for chest pain, palpitations and leg swelling. Gastrointestinal: Negative for abdominal pain, anal bleeding, blood in stool, constipation, diarrhea, nausea and vomiting. Endocrine: Negative for cold intolerance, heat intolerance, polydipsia, polyphagia and polyuria. Genitourinary: Positive for frequency and urgency. Negative for difficulty urinating, dysuria, flank pain and hematuria. Musculoskeletal: Negative for arthralgias, gait problem, joint swelling and myalgias. Skin: Negative for color change, pallor and rash. Allergic/Immunologic: Negative for environmental allergies and food allergies. Neurological: Positive for tremors. Negative for dizziness, seizures, syncope, speech difficulty, weakness, light-headedness, numbness and headaches. Hematological: Negative for adenopathy. Does not bruise/bleed easily. Psychiatric/Behavioral: Negative for agitation, behavioral problems, confusion, sleep disturbance and suicidal ideas. The patient is not nervous/anxious.            Current Outpatient Medications:     metoprolol succinate (TOPROL XL) 50 MG extended release tablet, Take 1 tablet by mouth daily, Disp: 90 tablet, Rfl: 1    lisinopril (PRINIVIL;ZESTRIL) 30 MG tablet, Take 1 tablet by mouth daily, Disp: 90 tablet, Rfl: 1    apixaban (ELIQUIS) 5 MG TABS tablet, Take 1 tablet by mouth 2 times daily Taper to 5mg bid after initial 7-days of 10mg [2-tab 5mg] bid, Disp: 180 tablet, Rfl: 1    LIALDA 1.2 g EC tablet, Take 1 tablet by mouth 3 times daily, Disp: 270 tablet, Rfl: 1    sertraline (ZOLOFT) 100 MG tablet, Take 1 tablet by mouth daily, Disp: 90 tablet, Rfl: 1    mercaptopurine (PURINETHOL) 50 MG chemo tablet, Take 1 tablet by mouth 2 times daily, Disp: 180 tablet, Rfl: 1    folic acid (FOLVITE) 1 MG tablet, Take 1 tablet by mouth daily, Disp: 90 tablet, Rfl: 1    atorvastatin (LIPITOR) 10 MG tablet, Take 1 tablet by mouth daily, Disp: 90 tablet, Rfl: 1    Elastic Bandages & Supports (JOBST KNEE HIGH COMPRESSION SM) MISC, Dx: Varicose veins of the legs with pain and swelling. Bilateral thigh-high 20-30 mm Hg compression stockings. , Disp: 2 each, Rfl: 5    tamsulosin (FLOMAX) 0.4 MG capsule, Take 0.4 mg by mouth daily, Disp: , Rfl:      No Known Allergies     Past Medical History:   Diagnosis Date    Anxiety 6/5/2019    COPD (chronic obstructive pulmonary disease) ([address]     Essential hypertension 12/5/2017    Hx of blood clots 09/2020    PEs    Lung nodule 9/9/2020    Mixed hyperlipidemia 6/5/2019    SOB (shortness of breath)     Ulcerative colitis ([address] 6/5/2019       Health Maintenance Due   Topic Date Due    Hepatitis C screen  1950        Patient Active Problem List   Diagnosis    Ulcerative colitis ([address]    Anxiety    Mixed hyperlipidemia    Benign prostatic hyperplasia with urinary frequency    Pulmonary embolism, bilateral ([address]    Tobacco dependence    Alcohol use    Essential hypertension    Chronic anticoagulation    COPD (chronic obstructive pulmonary disease) (HCC)    Elevated PSA        Past Surgical History:   Procedure Laterality Date    COLONOSCOPY      DENTAL SURGERY      extraction    PULMONARY EMBOLISM LYSIS  09/02/2020    Dr Zarina Costa        Family History   Problem Relation Age of Onset    COPD Mother         Social History     Tobacco Use    Smoking status: Heavy Tobacco Smoker     Packs/day: 1.00     Years: 30.00     Pack years: 30.00     Types: Cigars, Cigarettes     Start date: 9/3/1969    Smokeless tobacco: Never Used    Tobacco comment: smoked about 1 ppd cigarettes for about 10-15 yrs, cigars about 5/week for past 20+ yrs   Substance Use Topics    Alcohol use: Yes     Alcohol/week: 8.0 standard drinks     Types: 8 Cans of beer per week     Frequency: 4 or more times a week     Drinks per session: 1 or 2     Binge frequency: Never     Comment: coiple a day sometimes     Drug use: Never        Objective  Vitals:    01/06/21 0704 01/06/21 0706   BP: (!) 140/90 (!) 140/90   Pulse: 63    Temp: 97.8 °F (36.6 °C)    TempSrc: Temporal    Weight: 203 lb (92.1 kg)         Exam:  Physical Exam  Vitals signs reviewed. Constitutional:       General: He is not in acute distress. Appearance: Normal appearance. He is well-developed. He is not ill-appearing. HENT:      Head: Normocephalic. Right Ear: External ear normal.      Left Ear: External ear normal.   Eyes:      General: No scleral icterus. Right eye: No discharge. Left eye: No discharge. Conjunctiva/sclera: Conjunctivae normal.      Pupils: Pupils are equal, round, and reactive to light. Neck:      Musculoskeletal: Normal range of motion and neck supple. Thyroid: No thyromegaly. Cardiovascular:      Rate and Rhythm: Normal rate and regular rhythm. Heart sounds: Normal heart sounds. No murmur. No friction rub. No gallop. Pulmonary:      Effort: Pulmonary effort is normal. No respiratory distress. Breath sounds: Normal breath sounds. No wheezing or rales. Chest:      Chest wall: No tenderness. Abdominal:      General: Bowel sounds are normal. There is no distension. Palpations: Abdomen is soft. There is no mass. Tenderness: There is no abdominal tenderness. There is no guarding or rebound. Musculoskeletal: Normal range of motion. General: No tenderness or deformity. Lymphadenopathy:      Cervical: No cervical adenopathy. Skin:     General: Skin is warm and dry. Coloration: Skin is not pale. Findings: No erythema or rash. Neurological:      General: No focal deficit present. Mental Status: He is alert and oriented to person, place, and time. Cranial Nerves: No cranial nerve deficit. Sensory: No sensory deficit. Deep Tendon Reflexes: Reflexes normal.   Psychiatric:         Mood and Affect: Mood normal.         Behavior: Behavior normal.         Thought Content: Thought content normal.         Judgment: Judgment normal.          Last labs reviewed. ASSESSMENT & PLAN :   Problem List        Circulatory    Pulmonary embolism, bilateral (HCC)     Continue anticoagulation with Eliquis 5 mg twice daily. Watch for any signs of bleeding. Follow-up with his vascular surgeon Dr. Lori Sánchez         Relevant Medications    apixaban (ELIQUIS) 5 MG TABS tablet    Essential hypertension - Primary     Blood pressures are stable. Continue medications and monitor blood pressures at home. Call office if systolics are over 047 over diastolics over 90. Relevant Medications    metoprolol succinate (TOPROL XL) 50 MG extended release tablet    lisinopril (PRINIVIL;ZESTRIL) 30 MG tablet    Other Relevant Orders    Comprehensive Metabolic Panel       Respiratory    COPD (chronic obstructive pulmonary disease) ([facility]     Follow-up with pulmonologist.  He is not on any inhalers            Digestive    Ulcerative colitis ([facility]     Continue mercaptopurine and Lialda from [address] [place] [address].   Follow-up with him for his every 3 years colonoscopies         Relevant Medications    LIALDA 1.2 g EC tablet    mercaptopurine (PURINETHOL) 50 MG chemo tablet    folic acid (FOLVITE) 1 MG tablet       Other    Anxiety     Continue sertraline which  helps his anxiety Relevant Medications    sertraline (ZOLOFT) 100 MG tablet    Mixed hyperlipidemia     Watch saturated fats in diet and will monitor lipids fasting lipid profile ordered for before his next office visit. This will be done at the hospital         Relevant Medications    metoprolol succinate (TOPROL XL) 50 MG extended release tablet    lisinopril (PRINIVIL;ZESTRIL) 30 MG tablet    apixaban (ELIQUIS) 5 MG TABS tablet    atorvastatin (LIPITOR) 10 MG tablet    Other Relevant Orders    Lipid Panel    Benign prostatic hyperplasia with urinary frequency     Follow-up with Dr. Ernesto Cooper.  Per doctor's request a PSA will be done prior to his office visit in a couple months         Tobacco dependence     He still smokes a couple of cigars a day. Discussed smoking cessation with patient. He promises to cut down and try and cut it out         Chronic anticoagulation     Continue Eliquis. Watch for any signs of bleeding or drop in hemoglobin         Elevated PSA     Diagnostic PSA ordered which patient will have done at [facility] and take the report to Dr. Mundo Ulrich         Relevant Orders    PSA, DIAGNOSTIC           Return in about 6 months (around 7/6/2021), or hypertension.        Kassie Horvath, DO  1/6/2021

## 2021-01-06 NOTE — ASSESSMENT & PLAN NOTE
Watch saturated fats in diet and will monitor lipids fasting lipid profile ordered for before his next office visit.   This will be done at the hospital

## 2021-01-06 NOTE — ASSESSMENT & PLAN NOTE
Follow-up with Dr. Mayda Rivas.  Per doctor's request a PSA will be done prior to his office visit in a couple months

## 2021-01-06 NOTE — ASSESSMENT & PLAN NOTE
He still smokes a couple of cigars a day. Discussed smoking cessation with patient.   He promises to cut down and try and cut it out

## 2021-01-21 LAB — DIAGNOSTIC PSA: 4.81 NG/ML (ref 0–4)

## 2021-03-22 NOTE — PROGRESS NOTES
2020    Name: Noe Underwood : 1950 Sex: male  Age: 77 y.o. Subjective:  Chief Complaint   Patient presents with    Follow-Up from Hospital        Hypertension   Associated symptoms include shortness of breath. Pertinent negatives include no chest pain, headaches or palpitations. Patient presented to [facility] on  with acute onset of shortness of breath. He had a CTA which showed saddle embolus. He was then taken for catheterization and lysis of the embolus with thrombolytic agent. He was under the care of a vascular surgeon Dr. Erin Orta. He also saw Dr. Craig Dumont and and hematologist.  About 2 weeks prior to that he had an echocardiogram and cardiac catheterization done by Dr. Ayan Paul. Everything appeared to be okay. The repeat echocardiogram after his pulmonary embolus showed right ventricular strain. He had cor pulmonale and secondary pulmonary arterial arterial hypertension. He also had DVT of his lower extremities. He was started on Eliquis which will be increased to 10 mg twice daily after his loading dose  He had uncontrolled hypertension as well. Medications were adjusted, he is on lisinopril 30 mg daily metoprolol 50 mg daily spironolactone 25 mg daily medications reviewed and medication reconciliation done. He remains on mercaptopurine 50 mg twice daily Lialda 1.2 g 3 times daily Folvite thousand micrograms daily sertraline 100 mg daily tamsulosin 0.4 mg daily  He will see his vascular surgeon in October. Prior to that an echocardiogram and a CTA of his chest will be done. Patient has a history of ulcerative colitis under the care of Osvaldo Graham He had a colonoscopy done in 2019. This showed diverticuli and mild inflammation. No significant change from previous study. He continues on mercaptopurine and Lialda. Has a history of mildly increased PSA. He saw his urologist recently. PSA is slightly elevated at 5.18.   His urologist passed away so he is looking for a Orders signed. MAT   new one. We will send him to Dr. Adwoa Simmons Gilberto. He does complain of urgency and frequency during the day with some dribbling. Does not have nocturia. Has a little bit of difficulty starting his urinary stream in the morning. Nimisha Sahni He saw Dr. Jenny Cannon  who checked his PSA and started him on Flomax. Patient says the Flomax is not really helping his symptoms as well as he thought it should. I asked him to check with Dr. Chevy Aguilar regarding the different medication. Repeat PSA done in June 2020 had increased slightly to 7.71. Repeat PSA in the hospital was about 6    Patient has a history of anxiety on Zoloft, hypertension, hyperlipidemia. He takes his medications as directed and has no problems. He has mild hyperglycemia with a fasting blood sugar of 112. We will check his A1c. This is in the hospital it was 6% which is consistent with the prediabetic state    He has benign tremor which improves when he drinks alcohol. He is on lisinopril for blood pressure I like to switch him over to a beta-blocker which should improve his tremor hopefully it will not worsen his breathing. Reviewed blood work done in June 2020. Lipids were good. Fasting blood sugar was 112 otherwise CMP was unremarkable his PSA was slightly elevated as above    Reviewed immunizations and he is up-to-date on all his immunizations. He will be scheduled for annual wellness visit on in the near future. Review of Systems   Constitutional: Negative for appetite change, fatigue and unexpected weight change. HENT: Negative for congestion, ear pain, facial swelling, rhinorrhea, sore throat, tinnitus and trouble swallowing. Eyes: Negative for photophobia, pain, discharge, itching and visual disturbance. Respiratory: Positive for shortness of breath. Negative for cough, wheezing and stridor. See HPI   Cardiovascular: Negative for chest pain, palpitations and leg swelling.    Gastrointestinal: Negative for abdominal pain, anal bleeding, blood atorvastatin (LIPITOR) 10 MG tablet, Take 1 tablet by mouth daily, Disp: 90 tablet, Rfl: 3    sertraline (ZOLOFT) 100 MG tablet, Take 1 tablet by mouth daily, Disp: 90 tablet, Rfl: 3    tamsulosin (FLOMAX) 0.4 MG capsule, Take 0.4 mg by mouth daily, Disp: , Rfl:     folic acid (FOLVITE) 1 MG tablet, Take 1 tablet by mouth daily, Disp: 90 tablet, Rfl: 3    LIALDA 1.2 g EC tablet, Take 1 tablet by mouth 3 times daily, Disp: 270 tablet, Rfl: 3    mercaptopurine (PURINETHOL) 50 MG chemo tablet, Take 1 tablet by mouth 2 times daily, Disp: 180 tablet, Rfl: 3     No Known Allergies     Past Medical History:   Diagnosis Date    Anxiety 6/5/2019    [facility] obstructive [facility] disease) [facility]     Diastolic congestive heart failure, NYHA class 2 ([facility] 9/2/2020    Essential hypertension 12/5/2017    Hx of blood clots 09/2020    PEs    Mixed hyperlipidemia 6/5/2019    SOB (shortness of breath)     Ulcerative colitis ([facility] 6/5/2019       Health Maintenance Due   Topic Date Due    Hepatitis C screen  1950    DTaP/Tdap/Td vaccine (1 - Tdap) 12/01/1970    Low dose CT lung screening  12/01/2006    Annual Wellness Visit (AWV)  06/05/2019    Flu vaccine (1) 09/01/2020        Patient Active Problem List   Diagnosis    Uncontrolled hypertension    Ulcerative colitis ([facility]    Anxiety    Mixed hyperlipidemia    Elevated PSA    Benign prostatic hyperplasia with urinary frequency    Shortness of breath    COPD (chronic obstructive pulmonary disease) ([facility]    Acute saddle pulmonary embolism with acute cor pulmonale (HCC)    Nodular radiologic density    Abnormal liver enzymes    Hyperglycemia    Tobacco dependence    Alcohol use    Cor pulmonale (chronic) (HCC)    DVT (deep vein thrombosis) in pregnancy    Diastolic congestive heart failure, NYHA class 2 ([address]    Essential hypertension    Lung nodule        Past Surgical History:   Procedure Laterality Date    COLONOSCOPY     [place] DENTAL SURGERY extraction    PULMONARY EMBOLISM LYSIS  09/02/2020    Dr Vikas Douglas        Family History   Problem Relation Age of Onset    COPD Mother         Social History     Tobacco Use    Smoking status: Heavy Tobacco Smoker     Packs/day: 1.00     Years: 30.00     Pack years: 30.00     Types: Cigars, Cigarettes     Start date: 9/3/1969    Smokeless tobacco: Never Used    Tobacco comment: smoked about 1 ppd cigarettes for about 10-15 yrs, cigars about 5/week for past 20+ yrs   Substance Use Topics    Alcohol use: Yes     Alcohol/week: 8.0 standard drinks     Types: 8 Cans of beer per week     Frequency: 4 or more times a week     Drinks per session: 1 or 2     Binge frequency: Never     Comment: coiple a day sometimes     Drug use: Never        Objective  Vitals:    09/09/20 0824   BP: 128/70   Pulse: 53   Resp: 16   Temp: 97.5 °F (36.4 °C)   SpO2: 94%   Weight: 194 lb 12.8 oz (88.4 kg)   Height: 5' 4\" (1.626 m)        Exam:  Physical Exam  Vitals signs reviewed. Constitutional:       General: He is not in acute distress. Appearance: Normal appearance. He is well-developed. He is not ill-appearing. HENT:      Head: Normocephalic. Right Ear: External ear normal.      Left Ear: External ear normal.   Eyes:      General: No scleral icterus. Right eye: No discharge. Left eye: No discharge. Conjunctiva/sclera: Conjunctivae normal.      Pupils: Pupils are equal, round, and reactive to light. Neck:      Musculoskeletal: Normal range of motion and neck supple. Thyroid: No thyromegaly. Cardiovascular:      Rate and Rhythm: Normal rate and regular rhythm. Heart sounds: Normal heart sounds. No murmur. No friction rub. No gallop. Pulmonary:      Effort: Pulmonary effort is normal. No respiratory distress. Breath sounds: Normal breath sounds. No wheezing or rales. Chest:      Chest wall: No tenderness.    Abdominal:      General: Bowel sounds are normal. There is no distension. Palpations: Abdomen is soft. There is no mass. Tenderness: There is no abdominal tenderness. There is no guarding or rebound. Musculoskeletal: Normal range of motion. General: No tenderness or deformity. Lymphadenopathy:      Cervical: No cervical adenopathy. Skin:     General: Skin is warm and dry. Coloration: Skin is not pale. Findings: No erythema or rash. Neurological:      General: No focal deficit present. Mental Status: He is alert and oriented to person, place, and time. Cranial Nerves: No cranial nerve deficit. Sensory: No sensory deficit. Deep Tendon Reflexes: Reflexes normal.   Psychiatric:         Mood and Affect: Mood normal.         Behavior: Behavior normal.         Thought Content: Thought content normal.         Judgment: Judgment normal.          Last labs reviewed. ASSESSMENT & PLAN :   Problem List        Circulatory    Acute saddle pulmonary embolism with acute cor pulmonale (HCC) - Primary     Continue Eliquis and recheck CTA in October. Follow-up with vascular surgeon         Relevant Medications    apixaban (ELIQUIS) 5 MG TABS tablet (Start on 9/10/2020)    Other Relevant Orders    [facility] ([facility]) - Yulisa Hernandez MD, [facility]    [place] DISCHARGE MEDS RECONCILED W/ CURRENT OUTPATIENT MED LIST (Completed)    Cor pulmonale (chronic) ([facility]     Monitor.          Diastolic congestive heart failure, NYHA class 2 ([facility])     Repeat echocardiogram in October         Essential hypertension     Continue present medications and monitor blood pressures            Respiratory    COPD (chronic obstructive pulmonary disease) ([facility]    Relevant Orders    [facility] ([facility]) - Yulisa Hernandez MD, [facility]       Digestive    Ulcerative colitis ([facility]     Continue medications and follow-up with GI         Relevant Medications    folic acid (FOLVITE) 1 MG tablet    LIALDA 1.2 g EC tablet

## 2021-07-14 LAB
A/G RATIO: 1.3 RATIO (ref 1.1–2.2)
ALBUMIN SERPL-MCNC: 4 G/DL (ref 3.4–4.8)
ALP BLD-CCNC: 40 U/L (ref 42–121)
ALT SERPL-CCNC: 21 U/L (ref 10–63)
ANION GAP SERPL CALCULATED.3IONS-SCNC: 9 MEQ/L (ref 3–11)
AST SERPL-CCNC: 22 U/L (ref 10–41)
BILIRUB SERPL-MCNC: 1.1 MG/DL (ref 0.3–1.5)
BUN BLDV-MCNC: 18 MG/DL (ref 6–20)
CALCIUM SERPL-MCNC: 9.1 MG/DL (ref 8.5–10.5)
CHLORIDE BLD-SCNC: 105 MEQ/L (ref 98–107)
CHOLESTEROL: 189 MG/DL (ref 140–200)
CO2: 25 MEQ/L (ref 21–31)
CREAT SERPL-MCNC: 1.1 MG/DL (ref 0.6–1.3)
CREATININE + EGFR PANEL: 80 ML/MIN
GFR NON-AFRICAN AMERICAN: 66 ML/MIN
GLOBULIN: 3.2 G/DL (ref 1.9–3.9)
GLUCOSE BLD-MCNC: 105 MG/DL (ref 70–99)
HDLC SERPL-MCNC: 54 MG/DL (ref 29–71)
LDL CHOLESTEROL: 110 MG/DL
LDL/HDL RATIO: 2 RATIO
POTASSIUM SERPL-SCNC: 4 MEQ/L (ref 3.6–5)
SODIUM BLD-SCNC: 139 MEQ/L (ref 135–145)
TOTAL PROTEIN: 7.2 G/DL (ref 5.9–7.8)
TRIGL SERPL-MCNC: 73 MG/DL (ref 41–189)

## 2021-07-21 ENCOUNTER — OFFICE VISIT (OUTPATIENT)
Dept: PRIMARY CARE CLINIC | Age: 71
End: 2021-07-21
Payer: MEDICARE

## 2021-07-21 VITALS
DIASTOLIC BLOOD PRESSURE: 88 MMHG | BODY MASS INDEX: 29.39 KG/M2 | HEART RATE: 53 BPM | OXYGEN SATURATION: 96 % | SYSTOLIC BLOOD PRESSURE: 130 MMHG | WEIGHT: 199 LBS | TEMPERATURE: 97.5 F

## 2021-07-21 DIAGNOSIS — F41.9 ANXIETY: ICD-10-CM

## 2021-07-21 DIAGNOSIS — Z79.01 CURRENT USE OF LONG TERM ANTICOAGULATION: ICD-10-CM

## 2021-07-21 DIAGNOSIS — I26.02 ACUTE SADDLE PULMONARY EMBOLISM WITH ACUTE COR PULMONALE (HCC): ICD-10-CM

## 2021-07-21 DIAGNOSIS — K51.80 OTHER ULCERATIVE COLITIS WITHOUT COMPLICATION (HCC): ICD-10-CM

## 2021-07-21 DIAGNOSIS — I10 ESSENTIAL HYPERTENSION: Primary | ICD-10-CM

## 2021-07-21 DIAGNOSIS — Z79.01 CHRONIC ANTICOAGULATION: ICD-10-CM

## 2021-07-21 DIAGNOSIS — R73.9 HYPERGLYCEMIA: ICD-10-CM

## 2021-07-21 DIAGNOSIS — E78.2 MIXED HYPERLIPIDEMIA: ICD-10-CM

## 2021-07-21 DIAGNOSIS — I26.99 PULMONARY EMBOLISM, BILATERAL (HCC): ICD-10-CM

## 2021-07-21 LAB — HBA1C MFR BLD: 5.6 %

## 2021-07-21 PROCEDURE — G8427 DOCREV CUR MEDS BY ELIG CLIN: HCPCS | Performed by: INTERNAL MEDICINE

## 2021-07-21 PROCEDURE — 4040F PNEUMOC VAC/ADMIN/RCVD: CPT | Performed by: INTERNAL MEDICINE

## 2021-07-21 PROCEDURE — 3017F COLORECTAL CA SCREEN DOC REV: CPT | Performed by: INTERNAL MEDICINE

## 2021-07-21 PROCEDURE — 4004F PT TOBACCO SCREEN RCVD TLK: CPT | Performed by: INTERNAL MEDICINE

## 2021-07-21 PROCEDURE — 1123F ACP DISCUSS/DSCN MKR DOCD: CPT | Performed by: INTERNAL MEDICINE

## 2021-07-21 PROCEDURE — 83036 HEMOGLOBIN GLYCOSYLATED A1C: CPT | Performed by: INTERNAL MEDICINE

## 2021-07-21 PROCEDURE — G8417 CALC BMI ABV UP PARAM F/U: HCPCS | Performed by: INTERNAL MEDICINE

## 2021-07-21 PROCEDURE — 99214 OFFICE O/P EST MOD 30 MIN: CPT | Performed by: INTERNAL MEDICINE

## 2021-07-21 RX ORDER — SERTRALINE HYDROCHLORIDE 100 MG/1
100 TABLET, FILM COATED ORAL DAILY
Qty: 90 TABLET | Refills: 1 | Status: SHIPPED
Start: 2021-07-21 | End: 2022-01-26 | Stop reason: SDUPTHER

## 2021-07-21 RX ORDER — APIXABAN 5 MG/1
TABLET, FILM COATED ORAL
COMMUNITY
Start: 2021-05-03 | End: 2021-07-21 | Stop reason: SDUPTHER

## 2021-07-21 RX ORDER — MESALAMINE 1.2 G/1
1.2 TABLET, DELAYED RELEASE ORAL 3 TIMES DAILY
Qty: 270 TABLET | Refills: 1 | Status: SHIPPED
Start: 2021-07-21 | End: 2022-01-26 | Stop reason: SDUPTHER

## 2021-07-21 RX ORDER — ATORVASTATIN CALCIUM 10 MG/1
10 TABLET, FILM COATED ORAL DAILY
Qty: 90 TABLET | Refills: 1 | Status: SHIPPED
Start: 2021-07-21 | End: 2022-01-26 | Stop reason: SDUPTHER

## 2021-07-21 RX ORDER — LISINOPRIL 30 MG/1
30 TABLET ORAL DAILY
Qty: 90 TABLET | Refills: 1 | Status: SHIPPED
Start: 2021-07-21 | End: 2022-01-26 | Stop reason: SDUPTHER

## 2021-07-21 RX ORDER — MERCAPTOPURINE 50 MG/1
50 TABLET ORAL 2 TIMES DAILY
Qty: 180 TABLET | Refills: 1 | Status: SHIPPED
Start: 2021-07-21 | End: 2022-01-26 | Stop reason: SDUPTHER

## 2021-07-21 RX ORDER — FOLIC ACID 1 MG/1
1000 TABLET ORAL DAILY
Qty: 90 TABLET | Refills: 1 | Status: SHIPPED
Start: 2021-07-21 | End: 2022-01-26 | Stop reason: SDUPTHER

## 2021-07-21 RX ORDER — APIXABAN 5 MG/1
5 TABLET, FILM COATED ORAL 2 TIMES DAILY
Qty: 180 TABLET | Refills: 1 | Status: SHIPPED
Start: 2021-07-21 | End: 2022-01-26 | Stop reason: SDUPTHER

## 2021-07-21 RX ORDER — METOPROLOL SUCCINATE 50 MG/1
50 TABLET, EXTENDED RELEASE ORAL DAILY
Qty: 90 TABLET | Refills: 1 | Status: SHIPPED
Start: 2021-07-21 | End: 2022-01-26 | Stop reason: SDUPTHER

## 2021-07-21 SDOH — ECONOMIC STABILITY: FOOD INSECURITY: WITHIN THE PAST 12 MONTHS, YOU WORRIED THAT YOUR FOOD WOULD RUN OUT BEFORE YOU GOT MONEY TO BUY MORE.: NEVER TRUE

## 2021-07-21 SDOH — ECONOMIC STABILITY: FOOD INSECURITY: WITHIN THE PAST 12 MONTHS, THE FOOD YOU BOUGHT JUST DIDN'T LAST AND YOU DIDN'T HAVE MONEY TO GET MORE.: NEVER TRUE

## 2021-07-21 ASSESSMENT — ENCOUNTER SYMPTOMS
FACIAL SWELLING: 0
SORE THROAT: 0
ABDOMINAL PAIN: 0
SHORTNESS OF BREATH: 0
WHEEZING: 0
BLOOD IN STOOL: 0
EYE ITCHING: 0
EYE DISCHARGE: 0
EYE PAIN: 0
NAUSEA: 0
TROUBLE SWALLOWING: 0
COUGH: 0
STRIDOR: 0
DIARRHEA: 0
VOMITING: 0
CONSTIPATION: 0
PHOTOPHOBIA: 0
ANAL BLEEDING: 0
COLOR CHANGE: 0
RHINORRHEA: 0

## 2021-07-21 ASSESSMENT — SOCIAL DETERMINANTS OF HEALTH (SDOH): HOW HARD IS IT FOR YOU TO PAY FOR THE VERY BASICS LIKE FOOD, HOUSING, MEDICAL CARE, AND HEATING?: NOT HARD AT ALL

## 2021-07-21 NOTE — ASSESSMENT & PLAN NOTE
check hemoglobin A1c to make sure he does not have underlying diabetes  At this was 5.6% so he does not have underlying diabetes

## 2021-07-21 NOTE — ASSESSMENT & PLAN NOTE
Blood pressures are stable. Continue medications and monitor blood pressures at home. Call office if systolics are over 101 over diastolics over 90.

## 2021-07-21 NOTE — PROGRESS NOTES
2021    Name: Calvin Cam : 1950 Sex: male  Age: 72 y.o. Subjective:  Chief Complaint   Patient presents with    6 Month Follow-Up        Hyperlipidemia  Pertinent negatives include no chest pain, myalgias or shortness of breath. Hypertension  Pertinent negatives include no chest pain, headaches, palpitations or shortness of breath. Patient presented to [facility] on 9 2 with acute onset of shortness of breath. He had a CTA which showed saddle embolus. He was then taken for catheterization and lysis of the embolus with thrombolytic agent. He was under the care of a vascular surgeon Dr. Reji Hilario. He also saw Dr. Kiya Cm and and hematologist.   He had an echocardiogram and cardiac catheterization done by Dr. Malia Rouse. Everything appeared to be okay. The repeat echocardiogram after his pulmonary embolus showed right ventricular strain. He had cor pulmonale and secondary pulmonary arterial arterial hypertension. He also had DVT of his lower extremities. He was started on Eliquis which will be increased to 10 mg twice daily after his loading dose    He has been on Eliquis for about 10 months. He had an unprovoked DVT and saddle embolus PE. I am not sure if he is to stay on Eliquis lifelong. He says one of the physicians during this admission at [facility] told him he would be on it all his life. His repeat echocardiogram showed resolution of his right ventricular strain. I will have him see Dr. Kiya Cm to see if he is indeed to stay on Eliquis indefinitely or if it can be stopped after a year. He had uncontrolled hypertension as well. Medications were adjusted, he is on lisinopril 30 mg daily metoprolol 50 mg daily spironolactone 25 mg.. He remains on mercaptopurine 50 mg twice daily Lialda 1.2 g 3 times daily Folvite thousand micrograms daily sertraline 100 mg daily tamsulosin 0.4 mg daily    He saw Dr. Pravin Catalan his vascular surgeon in October and reviewed report.   Prior to that he had a CTA which showed bilateral pulmonary emboli. There is a left lower lobe opacity measured measuring 3.6 x 3.4 cm. Follow-up CT recommended in 3 months. He also had a 7 mm nonobstructive left renal calculus. Repeat ultrasound of his lower extremities showed thrombus within the left popliteal and peroneal veins. Echocardiogram repeated on 9/29/2020 showed normal left ventricle size with ejection fraction of 65%. The right ventricle structure and function were normal.  He had mild to moderate aortic regurgitation with right ventricular systolic pressure at 41 mm    Repeat CT without contrast done by his pulmonologist on September 23, 2020 showed resolution of the soft tissue density of the left lower lobe  . Patient has a history of ulcerative colitis under the care of Dejuan Weinstein He had a colonoscopy done in June 2019. This showed diverticuli and mild inflammation. No significant change from previous study. He continues on mercaptopurine and Lialda. Has a history of mildly increased PSA. He saw his urologist recently. PSA is slightly elevated at 5.18. His urologist passed away so he is looking for a new one. We will send him to Dr. Angela Yost Gilberto. He does complain of urgency and frequency during the day with some dribbling. Does not have nocturia. Has a little bit of difficulty starting his urinary stream in the morning. Heddy  He saw Dr. Nawaf Gloria  who checked his PSA and started him on Flomax. Patient says the Flomax is not really helping his symptoms as well as he thought it should. I asked him to check with Dr. Yecenia Bosch regarding the different medication. Repeat PSA done in June 2020 had increased slightly to 7.71. Repeat PSA in the hospital was about 6    Patient has a history of anxiety on Zoloft, hypertension, hyperlipidemia. He takes his medications as directed and has no problems. He has mild hyperglycemia with a fasting blood sugar of 112. We will check his A1c.   This is in the hospital it was 6% which is consistent with the prediabetic state    He has benign tremor which improves when he drinks alcohol. He is on lisinopril for blood pressure I like to switch him over to a beta-blocker which should improve his tremor hopefully it will not worsen his breathing. Reviewed blood work done in June 2020. Lipids were good. Fasting blood sugar was 112 otherwise CMP was unremarkable his PSA was slightly elevated as above    Reviewed immunizations and he is up-to-date on all his immunizations. He will be scheduled for annual wellness visit on in the near future. Review of Systems   Constitutional: Negative for appetite change, fatigue and unexpected weight change. HENT: Negative for congestion, ear pain, facial swelling, rhinorrhea, sore throat, tinnitus and trouble swallowing. Eyes: Negative for photophobia, pain, discharge, itching and visual disturbance. Respiratory: Negative for cough, shortness of breath, wheezing and stridor. See HPI   Cardiovascular: Negative for chest pain, palpitations and leg swelling. Gastrointestinal: Negative for abdominal pain, anal bleeding, blood in stool, constipation, diarrhea, nausea and vomiting. Endocrine: Negative for cold intolerance, heat intolerance, polydipsia, polyphagia and polyuria. Genitourinary: Positive for frequency and urgency. Negative for difficulty urinating, dysuria, flank pain and hematuria. Less than before   Musculoskeletal: Negative for arthralgias, gait problem, joint swelling and myalgias. Skin: Negative for color change, pallor and rash. Nodule nontender left palm   Allergic/Immunologic: Negative for environmental allergies and food allergies. Neurological: Positive for tremors. Negative for dizziness, seizures, syncope, speech difficulty, weakness, light-headedness, numbness and headaches. Hematological: Negative for adenopathy. Does not bruise/bleed easily.    Psychiatric/Behavioral: Negative for agitation, behavioral problems, confusion, sleep disturbance and suicidal ideas. The patient is not nervous/anxious.            Current Outpatient Medications:     sertraline (ZOLOFT) 100 MG tablet, Take 1 tablet by mouth daily, Disp: 90 tablet, Rfl: 1    metoprolol succinate (TOPROL XL) 50 MG extended release tablet, Take 1 tablet by mouth daily, Disp: 90 tablet, Rfl: 1    mercaptopurine (PURINETHOL) 50 MG chemo tablet, Take 1 tablet by mouth 2 times daily, Disp: 180 tablet, Rfl: 1    lisinopril (PRINIVIL;ZESTRIL) 30 MG tablet, Take 1 tablet by mouth daily, Disp: 90 tablet, Rfl: 1    LIALDA 1.2 g EC tablet, Take 1 tablet by mouth 3 times daily, Disp: 270 tablet, Rfl: 1    folic acid (FOLVITE) 1 MG tablet, Take 1 tablet by mouth daily, Disp: 90 tablet, Rfl: 1    atorvastatin (LIPITOR) 10 MG tablet, Take 1 tablet by mouth daily, Disp: 90 tablet, Rfl: 1    ELIQUIS 5 MG TABS tablet, Take 1 tablet by mouth 2 times daily, Disp: 180 tablet, Rfl: 1    tamsulosin (FLOMAX) 0.4 MG capsule, Take 0.4 mg by mouth daily, Disp: , Rfl:      No Known Allergies     Past Medical History:   Diagnosis Date    Anxiety 6/5/2019    COPD (chronic obstructive pulmonary disease) (HCC)     Essential hypertension 12/5/2017    Hx of blood clots 09/2020    PEs    Lung nodule 9/9/2020    Mixed hyperlipidemia 6/5/2019    SOB (shortness of breath)     Ul[facility]ative colitis ([facility] 6/5/2019       Health Maintenance Due   Topic Date Due    Hepatitis C screen  Never done        Patient Active Problem List   Diagnosis    [facility] ([facility]    Anxiety    Mixed hyperlipidemia    Benign prostatic hyperplasia with urinary frequency    Pulmonary embolism, bilateral (HCC)    Tobacco dependence    Alcohol use    Essential hypertension    Chronic anticoagulation    COPD (chronic obstructive pulmonary disease) (HCC)    Elevated PSA    Hyperglycemia    Acute saddle pulmonary embolism with acute cor pulmonale (HCC)        Past Surgical History:   Procedure Laterality Date    COLONOSCOPY      DENTAL SURGERY      extraction    PULMONARY EMBOLISM LYSIS  09/02/2020    Dr Ina Turner        Family History   Problem Relation Age of Onset    COPD Mother         Social History     Tobacco Use    Smoking status: Heavy Tobacco Smoker     Packs/day: 1.00     Years: 30.00     Pack years: 30.00     Types: Cigars, Cigarettes     Start date: 9/3/1969    Smokeless tobacco: Never Used    Tobacco comment: smoked about 1 ppd cigarettes for about 10-15 yrs, cigars about 5/week for past 20+ yrs   Vaping Use    Vaping Use: Never used   Substance Use Topics    Alcohol use: Yes     Alcohol/week: 8.0 standard drinks     Types: 8 Cans of beer per week     Comment: coiple a day sometimes     Drug use: Never        Objective  Vitals:    07/21/21 0753   BP: 130/88   Pulse: 53   Temp: 97.5 °F (36.4 °C)   TempSrc: Temporal   SpO2: 96%   Weight: 199 lb (90.3 kg)        Exam:  Physical Exam  Vitals reviewed. Constitutional:       General: He is not in acute distress. Appearance: Normal appearance. He is well-developed. He is not ill-appearing. HENT:      Head: Normocephalic. Right Ear: External ear normal.      Left Ear: External ear normal.   Eyes:      General: No scleral icterus. Right eye: No discharge. Left eye: No discharge. Conjunctiva/sclera: Conjunctivae normal.      Pupils: Pupils are equal, round, and reactive to light. Neck:      Thyroid: No thyromegaly. Cardiovascular:      Rate and Rhythm: Normal rate and regular rhythm. Heart sounds: Normal heart sounds. No murmur heard. No friction rub. No gallop. Pulmonary:      Effort: Pulmonary effort is normal. No respiratory distress. Breath sounds: Normal breath sounds. No wheezing or rales. Chest:      Chest wall: No tenderness. Abdominal:      General: Bowel sounds are normal. There is no distension. Palpations: Abdomen is soft.  There is no mass.      Tenderness: There is no abdominal tenderness. There is no guarding or rebound. Musculoskeletal:         General: No tenderness or deformity. Normal range of motion. Cervical back: Normal range of motion and neck supple. Comments: In the middle of his left palm is an approximately 0.6% nodular lesion which is nontender to palpation. Lymphadenopathy:      Cervical: No cervical adenopathy. Skin:     General: Skin is warm and dry. Coloration: Skin is not pale. Findings: No erythema or rash. Neurological:      General: No focal deficit present. Mental Status: He is alert and oriented to person, place, and time. Cranial Nerves: No cranial nerve deficit. Sensory: No sensory deficit. Deep Tendon Reflexes: Reflexes normal.   Psychiatric:         Mood and Affect: Mood normal.         Behavior: Behavior normal.         Thought Content: Thought content normal.         Judgment: Judgment normal.          Last labs reviewed. ASSESSMENT & PLAN :   Problem List        Circulatory    Pulmonary embolism, bilateral ([address]       resolved. No dyspnea. Relevant Medications    ELIQUIS 5 MG TABS tablet    Other Relevant Orders    Ambulatory referral to Pulmonology    Essential hypertension - Primary     Blood pressures are stable. Continue medications and monitor blood pressures at home. Call office if systolics are over 545 over diastolics over 90. Relevant Medications    metoprolol succinate (TOPROL XL) 50 MG extended release tablet    lisinopril (PRINIVIL;ZESTRIL) 30 MG tablet    Acute saddle pulmonary embolism with acute cor pulmonale (HCC)       resolved            Digestive    Ulcerative colitis ([address]       continue Lialda and 6-MP and follow-up with GI         Relevant Medications    mercaptopurine (PURINETHOL) 50 MG chemo tablet    LIALDA 1.2 g EC tablet    folic acid (FOLVITE) 1 MG tablet       Other    Anxiety       he is on no benzodiazepines.   He takes sertraline which controls his anxiety         Relevant Medications    sertraline (ZOLOFT) 100 MG tablet    Mixed hyperlipidemia     Watch saturated fats in diet and will monitor lipids  continue atorvastatin 10 mg at bedtime         Relevant Medications    metoprolol succinate (TOPROL XL) 50 MG extended release tablet    lisinopril (PRINIVIL;ZESTRIL) 30 MG tablet    atorvastatin (LIPITOR) 10 MG tablet    ELIQUIS 5 MG TABS tablet    Chronic anticoagulation     Refer to pulmonologist to see if he is indeed to stay on Eliquis lifelong because of his unprovoked episodes of DVT and PE or if he is able to go off of it after a year of therapy          Hyperglycemia       check hemoglobin A1c to make sure he does not have underlying diabetes  At this was 5.6% so he does not have underlying diabetes         Relevant Orders    POCT glycosylated hemoglobin (Hb A1C) (Completed)           Return in about 3 months (around 10/21/2021), or AWV.        Lety Can, DO  7/21/2021

## 2021-07-21 NOTE — ASSESSMENT & PLAN NOTE
Refer to pulmonologist to see if he is indeed to stay on Eliquis lifelong because of his unprovoked episodes of DVT and PE or if he is able to go off of it after a year of therapy

## 2021-09-29 ENCOUNTER — OFFICE VISIT (OUTPATIENT)
Dept: CARDIOLOGY CLINIC | Age: 71
End: 2021-09-29
Payer: MEDICARE

## 2021-09-29 VITALS
DIASTOLIC BLOOD PRESSURE: 89 MMHG | SYSTOLIC BLOOD PRESSURE: 154 MMHG | BODY MASS INDEX: 30.1 KG/M2 | RESPIRATION RATE: 16 BRPM | HEART RATE: 55 BPM | WEIGHT: 203.2 LBS | HEIGHT: 69 IN | OXYGEN SATURATION: 96 %

## 2021-09-29 DIAGNOSIS — I10 ESSENTIAL HYPERTENSION: Primary | ICD-10-CM

## 2021-09-29 DIAGNOSIS — I35.9 AORTIC VALVE DISEASE: ICD-10-CM

## 2021-09-29 DIAGNOSIS — E78.2 MIXED HYPERLIPIDEMIA: ICD-10-CM

## 2021-09-29 PROBLEM — Z86.711 HISTORY OF PULMONARY EMBOLISM: Status: ACTIVE | Noted: 2021-07-21

## 2021-09-29 PROBLEM — I26.99 PULMONARY EMBOLISM, BILATERAL (HCC): Status: RESOLVED | Noted: 2020-09-02 | Resolved: 2021-09-29

## 2021-09-29 PROCEDURE — G8427 DOCREV CUR MEDS BY ELIG CLIN: HCPCS | Performed by: INTERNAL MEDICINE

## 2021-09-29 PROCEDURE — 1123F ACP DISCUSS/DSCN MKR DOCD: CPT | Performed by: INTERNAL MEDICINE

## 2021-09-29 PROCEDURE — G8417 CALC BMI ABV UP PARAM F/U: HCPCS | Performed by: INTERNAL MEDICINE

## 2021-09-29 PROCEDURE — 4040F PNEUMOC VAC/ADMIN/RCVD: CPT | Performed by: INTERNAL MEDICINE

## 2021-09-29 PROCEDURE — 3017F COLORECTAL CA SCREEN DOC REV: CPT | Performed by: INTERNAL MEDICINE

## 2021-09-29 PROCEDURE — 4004F PT TOBACCO SCREEN RCVD TLK: CPT | Performed by: INTERNAL MEDICINE

## 2021-09-29 PROCEDURE — 93000 ELECTROCARDIOGRAM COMPLETE: CPT | Performed by: INTERNAL MEDICINE

## 2021-09-29 PROCEDURE — 99214 OFFICE O/P EST MOD 30 MIN: CPT | Performed by: INTERNAL MEDICINE

## 2021-09-29 NOTE — PROGRESS NOTES
status: Heavy Tobacco Smoker     Packs/day: 1.00     Years: 30.00     Pack years: 30.00     Types: Cigars, Cigarettes     Start date: 9/3/1969    Smokeless tobacco: Never Used    Tobacco comment: smoked about 1 ppd cigarettes for about 10-15 yrs, cigars about 5/week for past 20+ yrs   Vaping Use    Vaping Use: Never used   Substance and Sexual Activity    Alcohol use: Yes     Alcohol/week: 8.0 standard drinks     Types: 8 Cans of beer per week     Comment: coiple a day sometimes     Drug use: Never    Sexual activity: Not Currently   Other Topics Concern    Not on file   Social History Narrative    Not on file     Social Determinants of Health     Financial Resource Strain: Low Risk     Difficulty of Paying Living Expenses: Not hard at all   Food Insecurity: No Food Insecurity    Worried About [address] in the Last Year: Never true    [facility] [address] in the Last Year: Never true   Transportation Needs:     Lack of Transportation (Medical):  Lack of Transportation (Non-Medical):    Physical Activity:     Days of Exercise per Week:     Minutes of Exercise per Session:    Stress:     Feeling of Stress :    Social Connections:     Frequency of Communication with Friends and Family:     Frequency of Social Gatherings with Friends and Family:     Attends Zoroastrian Services:     Active Member of Clubs or Organizations:     Attends Club or Organization Meetings:     Marital Status:    Intimate Partner Violence:     Fear of Current or Ex-Partner:     Emotionally Abused:     Physically Abused:     Sexually Abused:        Family History   Problem Relation Age of Onset    COPD Mother          SUBJECTIVE: Yung Alcala presents to the office today for followup of chronic cardiac diagnoses. In 2020 presented with severe SOB and was found to have a submassive PE treated with EKOS by dr. Matthew Wesley.  Echo at that time showed severe RV enlargement and dysfunction and RVSP > 100. Estela Bound   [place] to be warm and dry. No rash noted. Not diaphoretic. No erythema. Psychiatric: Normal mood and affect.  Behavior is normal.     EKG:  normal sinus rhythm, rate 55, axis +53, normal    ASSESSMENT AND PLAN:  Patient Active Problem List   Diagnosis    Essential hypertension    Ulcerative colitis     Anxiety    Mixed hyperlipidemia    Mixed aortic valve disease    Benign prostatic hyperplasia with urinary frequency    Hx of unprovoked pulmonary embolism     COPD (chronic obstructive pulmonary disease)     Doing well post unprovoked submassive PE with EKOS lysis in 2020 - no symptoms / signs c/w development of CTEPH  Given 30% potential for recurrence in 5 years would continue anticoagulation, but can drop eliquis to 2.5 mg bid indefinitely  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

## 2021-11-03 ENCOUNTER — OFFICE VISIT (OUTPATIENT)
Dept: FAMILY MEDICINE CLINIC | Age: 71
End: 2021-11-03
Payer: MEDICARE

## 2021-11-03 VITALS
DIASTOLIC BLOOD PRESSURE: 80 MMHG | BODY MASS INDEX: 30.42 KG/M2 | HEART RATE: 55 BPM | TEMPERATURE: 97.3 F | WEIGHT: 206 LBS | SYSTOLIC BLOOD PRESSURE: 130 MMHG | OXYGEN SATURATION: 98 %

## 2021-11-03 DIAGNOSIS — J06.9 UPPER RESPIRATORY INFECTION WITH COUGH AND CONGESTION: Primary | ICD-10-CM

## 2021-11-03 PROCEDURE — 99213 OFFICE O/P EST LOW 20 MIN: CPT | Performed by: NURSE PRACTITIONER

## 2021-11-03 PROCEDURE — G8427 DOCREV CUR MEDS BY ELIG CLIN: HCPCS | Performed by: NURSE PRACTITIONER

## 2021-11-03 PROCEDURE — 4004F PT TOBACCO SCREEN RCVD TLK: CPT | Performed by: NURSE PRACTITIONER

## 2021-11-03 PROCEDURE — 4040F PNEUMOC VAC/ADMIN/RCVD: CPT | Performed by: NURSE PRACTITIONER

## 2021-11-03 PROCEDURE — G8417 CALC BMI ABV UP PARAM F/U: HCPCS | Performed by: NURSE PRACTITIONER

## 2021-11-03 PROCEDURE — 3017F COLORECTAL CA SCREEN DOC REV: CPT | Performed by: NURSE PRACTITIONER

## 2021-11-03 PROCEDURE — 1123F ACP DISCUSS/DSCN MKR DOCD: CPT | Performed by: NURSE PRACTITIONER

## 2021-11-03 PROCEDURE — G8484 FLU IMMUNIZE NO ADMIN: HCPCS | Performed by: NURSE PRACTITIONER

## 2021-11-03 RX ORDER — AZITHROMYCIN 250 MG/1
250 TABLET, FILM COATED ORAL SEE ADMIN INSTRUCTIONS
Qty: 6 TABLET | Refills: 0 | Status: SHIPPED | OUTPATIENT
Start: 2021-11-03 | End: 2021-11-08

## 2021-11-03 NOTE — PROGRESS NOTES
Chief Complaint:   Cough and Congestion    History of Present Illness   Source of history provided by:  patient. Lana Gilmaner is a 72 y.o. old male who presents to walk-in with a cough for the past 14 days. States the cough is productive with clear sputum. Reports the cough is worse in the morning and at nighttime and better throughout the day. No fever noted. Reports post nasal drip and nasal congestion associated with cough. Denies any N/V/D, abdominal pain, CP, progressive SOB, dizziness, or lethargy. Denies any sick contacts. Review of Systems    Unless otherwise stated in this report or unable to obtain because of the patient's clinical or mental status as evidenced by the medical record, this patients's positive and negative responses for Review of Systems, constitutional, psych, eyes, ENT, cardiovascular, respiratory, gastrointestinal, neurological, genitourinary, musculoskeletal, integument systems and systems related to the presenting problem are either stated in the preceding or were not pertinent or were negative for the symptoms and/or complaints related to the medical problem. Past Medical History:  has a past medical history of Anxiety, COPD (chronic obstructive pulmonary disease) ([address] Essential hypertension, Hx of blood clots, Lung nodule, Mixed hyperlipidemia, SOB (shortness of breath), and Ulcerative colitis ([address] Past Surgical History:  has a past surgical history that includes Dental surgery; Colonoscopy; and PULMONARY EMBOLISM LYSIS (09/02/2020). Social History:  reports that he has been smoking cigars and cigarettes. He started smoking about 52 years ago. He has a 30.00 pack-year smoking history. He has never used smokeless tobacco. He reports current alcohol use of about 8.0 standard drinks of alcohol per week. He reports that he does not use drugs. Family History: family history includes COPD in his mother. Allergies: Patient has no known allergies.     Physical Exam Vital Signs:  /80   Pulse 55   Temp 97.3 °F (36.3 °C) (Temporal)   Wt 206 lb (93.4 kg)   SpO2 98%   BMI 30.42 kg/m²    Oxygen Saturation Interpretation: Normal.    Constitutional:  Alert, development consistent with age. Ears:  External Ears: Normal bilateral pinna. TM's & External Canals: TM's normal bilaterally without perforation. Canals without erythema or drainage. Nose:   There is no obvious septal defect. Mouth:  Moist bucca mucosa and normal tongue. Throat: Mild posterior pharyngeal erythema without exudates or lesions. Neck:  Supple. There is no obvious adenopathy or neck tenderness. Lungs:   Breath sounds: Normal chest expansion and breath sounds noted throughout. No wheezes, rales, or rhonchi noted. Heart: Bradycardia with regular rhythm, normal heart sounds, without pathological murmurs, ectopy, gallops, or rubs. Abdomen:  Soft, nontender, good bowel sounds. No firm or pulsatile mass. Skin:  Normal turgor. Warm, dry, without visible rash. Neurological:  Oriented. Motor functions intact. Test Results Section   (All laboratory and radiology results have been personally reviewed by myself)  Labs:  No results found for this visit on 11/03/21. Imaging: All Radiology results interpreted by Radiologist unless otherwise noted. No results found. Assessment / Plan   Impression(s):  Last Pressley was seen today for cough and congestion. Diagnoses and all orders for this visit:    Upper respiratory infection with cough and congestion  -     azithromycin (ZITHROMAX) 250 MG tablet; Take 1 tablet by mouth See Admin Instructions for 5 days 500mg on day 1 followed by 250mg on days 2 - 5  - OTC Mucinex as needed for cough/congestion    Prescription sent for Azithromycin, side effects discussed. Pt advised to f/u with PCP in 7-10 days for recheck if symptoms persist.  ER if changes or worse. Red flag symptoms discussed with patient.  Advised to take all medications as directed. Patient verbalized understanding and agreeable to plan of care. All questions answered. Return if symptoms worsen or fail to improve. Electronically signed by GRACE Zaldivar CNP   DD: 11/3/21    **This report was transcribed using voice recognition software. Every effort was made to ensure accuracy; however, inadvertent computerized transcription errors may be present.

## 2021-11-08 ENCOUNTER — TELEPHONE (OUTPATIENT)
Dept: PRIMARY CARE CLINIC | Age: 71
End: 2021-11-08

## 2021-11-08 NOTE — TELEPHONE ENCOUNTER
----- Message from Jarad Coronel sent at 11/1/2021  2:10 PM EDT -----  Subject: Message to Provider    QUESTIONS  Information for Provider? Pt states he has had a cough for about 10 days   and would like to talk to the nurse. He did not want to make an appt. please call pt  ---------------------------------------------------------------------------  --------------  CALL BACK INFO  What is the best way for the office to contact you? OK to leave message on   voicemail  Preferred Call Back Phone Number? [phone]  ---------------------------------------------------------------------------  --------------  SCRIPT ANSWERS  Relationship to Patient?  Self

## 2021-11-29 ENCOUNTER — TELEPHONE (OUTPATIENT)
Dept: FAMILY MEDICINE CLINIC | Age: 71
End: 2021-11-29

## 2021-11-29 NOTE — TELEPHONE ENCOUNTER
----- Message from Karen Cole sent at 11/26/2021 11:19 AM EST -----  Subject: Appointment Request    Reason for Call: Urgent Cough Cold    QUESTIONS  Type of Appointment? Established Patient  Reason for appointment request? No appointments available during search  Additional Information for Provider? Pt as attmpted to call office   multiple times with no call back he said. I attempted with no answer 4   times and he was not a happy camper. He would like a call back to schedule   as there is no appts showing for me at this time  ---------------------------------------------------------------------------  --------------  [address]  What is the best way for the office to contact you? OK to leave message on   voicemail  Preferred Call Back Phone Number? [phone]  ---------------------------------------------------------------------------  --------------  SCRIPT ANSWERS  Relationship to Patient? Self  (If the patient has Medicare as their primary insurance coverage ask this   question) Are you requesting a Medicare Annual Wellness Visit? No  (Is the patient requesting a pap smear with their physical exam?)? No  (Is the patient requesting their annual physical and does not need PAP or   AWV per above?)? Yes   Have you been diagnosed with, awaiting test results for, or told that you   are suspected of having COVID-19 (Coronavirus)? (If patient has tested   negative or was tested as a requirement for work, school, or travel and   not based on symptoms, answer no)? No  Within the past two weeks have you developed any of the following symptoms   (answer no if symptoms have been present longer than 2 weeks or began   more than 2 weeks ago)? Fever or Chills, Cough, Shortness of breath or   difficulty breathing, Loss of taste or smell, Sore throat, Nasal   congestion, Sneezing or runny nose, Fatigue or generalized body aches   (answer no if pain is specific to a body part e.g. back pain), Diarrhea,   Headache? Yes  Are you currently unable to finish sentences due to any difficulty   breathing? No  Are you unable to swallow liquids? No  Are you having fevers (100.4 or greater), chills, or sweats? No  Do you have COPD, asthma or a chronic lung condition? No  Have your symptoms been present for more than 5 days?  Yes

## 2021-11-29 NOTE — TELEPHONE ENCOUNTER
Katie Campbell would like a call regarding being DX with COVID on 11/27. Wants to make sure he's taking what is needed.   Tested at [facility] in [facility]

## 2021-11-29 NOTE — TELEPHONE ENCOUNTER
Called patient. Will send in requisition for monoclonal antibody infusion to [facility] tomorrow.   This is within the 10-day window

## 2021-11-30 ENCOUNTER — TELEPHONE (OUTPATIENT)
Dept: PRIMARY CARE CLINIC | Age: 71
End: 2021-11-30

## 2021-11-30 NOTE — TELEPHONE ENCOUNTER
Patient called on 11/29/2021. He complained of congestion, cough, loss of sense of taste or smell. He was not short of breath. He had no fever over 100. Symptoms started on 11/23/2021. He is a candidate for monoclonal antibody infusion as he is over 73years old with a BMI greater than 25 he also has a history of ulcerative colitis and is on 6-mercaptopurine. He is on no oxygen. Discussed with patient. Requisition sent for monoclonal antibody infusion at [facility] on 11/30/2021. They will contact the patient to schedule the infusion as he is less than 10 days from the onset of symptoms.

## 2022-01-18 ENCOUNTER — TELEPHONE (OUTPATIENT)
Dept: PRIMARY CARE CLINIC | Age: 72
End: 2022-01-18

## 2022-01-18 DIAGNOSIS — Z86.711 HISTORY OF PULMONARY EMBOLISM: ICD-10-CM

## 2022-01-18 DIAGNOSIS — E78.2 MIXED HYPERLIPIDEMIA: ICD-10-CM

## 2022-01-18 DIAGNOSIS — I10 ESSENTIAL HYPERTENSION: Primary | ICD-10-CM

## 2022-01-18 DIAGNOSIS — K51.80 OTHER ULCERATIVE COLITIS WITHOUT COMPLICATION (HCC): ICD-10-CM

## 2022-01-18 DIAGNOSIS — Z79.01 CHRONIC ANTICOAGULATION: ICD-10-CM

## 2022-01-18 DIAGNOSIS — Z87.898 HISTORY OF ELEVATED PSA: ICD-10-CM

## 2022-01-18 NOTE — TELEPHONE ENCOUNTER
----- Message from Jazmyne Hart sent at 1/18/2022  8:45 AM EST -----  Subject: Referral Request    QUESTIONS   Reason for referral request? Patient has a 6 month follow up on 1/26/2022   and was wanting to get his blood work done at the [facility] and to   get the orders sent there. Has the physician seen you for this condition before? No   Preferred Specialist (if applicable)? Do you already have an appointment scheduled? Yes  Select Scheduled Date? 2022-01-26  Select Scheduled Physician? Slade Faria   Additional Information for Provider? Patient would like a call when the   orders are sent over. Patient said a text is okay if that is possible.   ---------------------------------------------------------------------------  --------------  CALL BACK INFO  What is the best way for the office to contact you? OK to leave message on   voicemail  Preferred Call Back Phone Number?  [phone]

## 2022-01-19 LAB
A/G RATIO: 1.3 RATIO (ref 1.1–2.2)
ALBUMIN SERPL-MCNC: 4.1 G/DL (ref 3.4–4.8)
ALP BLD-CCNC: 41 U/L (ref 42–121)
ALT SERPL-CCNC: 24 U/L (ref 10–63)
ANION GAP SERPL CALCULATED.3IONS-SCNC: 7 MEQ/L (ref 3–11)
AST SERPL-CCNC: 21 U/L (ref 10–41)
BASOPHILS ABSOLUTE: 0 K/UL (ref 0–0.2)
BASOPHILS RELATIVE PERCENT: 0.5 % (ref 0–1.5)
BILIRUB SERPL-MCNC: 0.7 MG/DL (ref 0.3–1.5)
BUN BLDV-MCNC: 15 MG/DL (ref 6–20)
CALCIUM SERPL-MCNC: 8.9 MG/DL (ref 8.5–10.5)
CHLORIDE BLD-SCNC: 108 MEQ/L (ref 98–107)
CHOLESTEROL: 178 MG/DL (ref 140–200)
CO2: 25 MEQ/L (ref 21–31)
CREAT SERPL-MCNC: 1 MG/DL (ref 0.6–1.3)
CREATININE + EGFR PANEL: 89 ML/MIN
DIAGNOSTIC PSA: 5.07 NG/ML (ref 0–4)
EOSINOPHILS ABSOLUTE: 0.1 K/UL (ref 0–0.33)
EOSINOPHILS RELATIVE PERCENT: 2.6 % (ref 0–3)
GFR NON-AFRICAN AMERICAN: 74 ML/MIN
GLOBULIN: 3.2 G/DL (ref 1.9–3.9)
GLUCOSE BLD-MCNC: 109 MG/DL (ref 70–99)
HCT VFR BLD CALC: 42 % (ref 41–50)
HDLC SERPL-MCNC: 55 MG/DL (ref 29–71)
HEMOGLOBIN: 14.3 G/DL (ref 13.5–16.5)
LDL CHOLESTEROL: 106 MG/DL
LDL/HDL RATIO: 1.9 RATIO
LYMPHOCYTES ABSOLUTE: 1.1 K/UL (ref 1.1–4.8)
LYMPHOCYTES RELATIVE PERCENT: 25.9 % (ref 24–44)
MCH RBC QN AUTO: 33.5 PG (ref 28–34)
MCHC RBC AUTO-ENTMCNC: 34 G/DL (ref 33–37)
MCV RBC AUTO: 98.4 FL (ref 80–100)
MONOCYTES ABSOLUTE: 0.4 K/UL (ref 0.2–0.7)
MONOCYTES RELATIVE PERCENT: 9.4 % (ref 3.4–9)
NEUTROPHILS ABSOLUTE: 2.5 K/UL (ref 1.83–8.7)
PDW BLD-RTO: 14.9 % (ref 10.9–14.3)
PLATELET # BLD: 212 K/UL (ref 150–450)
PMV BLD AUTO: 8.1 FL (ref 7.4–10.4)
POTASSIUM SERPL-SCNC: 4.1 MEQ/L (ref 3.6–5)
RBC # BLD: 4.26 M/UL (ref 4.5–5.5)
SEGMENTED NEUTROPHILS RELATIVE PERCENT: 61.6 % (ref 40–74)
SODIUM BLD-SCNC: 140 MEQ/L (ref 135–145)
TOTAL PROTEIN: 7.3 G/DL (ref 5.9–7.8)
TRIGL SERPL-MCNC: 94 MG/DL (ref 41–189)
WBC # BLD: 4.1 K/UL (ref 4.5–11)

## 2022-01-26 ENCOUNTER — OFFICE VISIT (OUTPATIENT)
Dept: PRIMARY CARE CLINIC | Age: 72
End: 2022-01-26
Payer: MEDICARE

## 2022-01-26 VITALS
BODY MASS INDEX: 30.51 KG/M2 | DIASTOLIC BLOOD PRESSURE: 80 MMHG | WEIGHT: 206 LBS | OXYGEN SATURATION: 96 % | HEIGHT: 69 IN | HEART RATE: 135 BPM | SYSTOLIC BLOOD PRESSURE: 132 MMHG | RESPIRATION RATE: 16 BRPM

## 2022-01-26 DIAGNOSIS — I26.99 PULMONARY EMBOLISM, BILATERAL (HCC): ICD-10-CM

## 2022-01-26 DIAGNOSIS — E78.2 MIXED HYPERLIPIDEMIA: ICD-10-CM

## 2022-01-26 DIAGNOSIS — F41.9 ANXIETY: ICD-10-CM

## 2022-01-26 DIAGNOSIS — J44.9 CHRONIC OBSTRUCTIVE PULMONARY DISEASE, UNSPECIFIED COPD TYPE (HCC): ICD-10-CM

## 2022-01-26 DIAGNOSIS — I10 ESSENTIAL HYPERTENSION: Primary | ICD-10-CM

## 2022-01-26 DIAGNOSIS — Z12.2 SCREENING FOR LUNG CANCER: ICD-10-CM

## 2022-01-26 DIAGNOSIS — F17.200 TOBACCO DEPENDENCE: ICD-10-CM

## 2022-01-26 DIAGNOSIS — K51.80 OTHER ULCERATIVE COLITIS WITHOUT COMPLICATION (HCC): ICD-10-CM

## 2022-01-26 DIAGNOSIS — R73.9 HYPERGLYCEMIA: ICD-10-CM

## 2022-01-26 DIAGNOSIS — Z87.891 PERSONAL HISTORY OF TOBACCO USE: ICD-10-CM

## 2022-01-26 PROBLEM — I26.02 ACUTE SADDLE PULMONARY EMBOLISM WITH ACUTE COR PULMONALE (HCC): Status: ACTIVE | Noted: 2022-01-26

## 2022-01-26 PROBLEM — I26.02 ACUTE SADDLE PULMONARY EMBOLISM WITH ACUTE COR PULMONALE (HCC): Status: RESOLVED | Noted: 2022-01-26 | Resolved: 2022-01-26

## 2022-01-26 PROCEDURE — 4040F PNEUMOC VAC/ADMIN/RCVD: CPT | Performed by: INTERNAL MEDICINE

## 2022-01-26 PROCEDURE — G0296 VISIT TO DETERM LDCT ELIG: HCPCS | Performed by: INTERNAL MEDICINE

## 2022-01-26 PROCEDURE — G8482 FLU IMMUNIZE ORDER/ADMIN: HCPCS | Performed by: INTERNAL MEDICINE

## 2022-01-26 PROCEDURE — 4004F PT TOBACCO SCREEN RCVD TLK: CPT | Performed by: INTERNAL MEDICINE

## 2022-01-26 PROCEDURE — G8427 DOCREV CUR MEDS BY ELIG CLIN: HCPCS | Performed by: INTERNAL MEDICINE

## 2022-01-26 PROCEDURE — 1123F ACP DISCUSS/DSCN MKR DOCD: CPT | Performed by: INTERNAL MEDICINE

## 2022-01-26 PROCEDURE — 99214 OFFICE O/P EST MOD 30 MIN: CPT | Performed by: INTERNAL MEDICINE

## 2022-01-26 PROCEDURE — G8417 CALC BMI ABV UP PARAM F/U: HCPCS | Performed by: INTERNAL MEDICINE

## 2022-01-26 PROCEDURE — 3023F SPIROM DOC REV: CPT | Performed by: INTERNAL MEDICINE

## 2022-01-26 PROCEDURE — 3017F COLORECTAL CA SCREEN DOC REV: CPT | Performed by: INTERNAL MEDICINE

## 2022-01-26 RX ORDER — MERCAPTOPURINE 50 MG/1
50 TABLET ORAL 2 TIMES DAILY
Qty: 180 TABLET | Refills: 1 | Status: SHIPPED
Start: 2022-01-26 | End: 2022-07-26 | Stop reason: SDUPTHER

## 2022-01-26 RX ORDER — SERTRALINE HYDROCHLORIDE 100 MG/1
100 TABLET, FILM COATED ORAL DAILY
Qty: 90 TABLET | Refills: 1 | Status: SHIPPED
Start: 2022-01-26 | End: 2022-07-26 | Stop reason: SDUPTHER

## 2022-01-26 RX ORDER — MESALAMINE 1.2 G/1
1.2 TABLET, DELAYED RELEASE ORAL 3 TIMES DAILY
Qty: 270 TABLET | Refills: 1 | Status: SHIPPED | OUTPATIENT
Start: 2022-01-26

## 2022-01-26 RX ORDER — FOLIC ACID 1 MG/1
1000 TABLET ORAL DAILY
Qty: 90 TABLET | Refills: 1 | Status: SHIPPED
Start: 2022-01-26 | End: 2022-07-26 | Stop reason: SDUPTHER

## 2022-01-26 RX ORDER — ATORVASTATIN CALCIUM 10 MG/1
10 TABLET, FILM COATED ORAL DAILY
Qty: 90 TABLET | Refills: 1 | Status: SHIPPED
Start: 2022-01-26 | End: 2022-07-26 | Stop reason: SDUPTHER

## 2022-01-26 RX ORDER — APIXABAN 5 MG/1
5 TABLET, FILM COATED ORAL 2 TIMES DAILY
Qty: 180 TABLET | Refills: 1 | Status: SHIPPED
Start: 2022-01-26 | End: 2022-01-26 | Stop reason: DRUGHIGH

## 2022-01-26 RX ORDER — METOPROLOL SUCCINATE 50 MG/1
50 TABLET, EXTENDED RELEASE ORAL DAILY
Qty: 90 TABLET | Refills: 1 | Status: SHIPPED
Start: 2022-01-26 | End: 2022-07-26 | Stop reason: SDUPTHER

## 2022-01-26 RX ORDER — LISINOPRIL 30 MG/1
30 TABLET ORAL DAILY
Qty: 90 TABLET | Refills: 1 | Status: SHIPPED
Start: 2022-01-26 | End: 2022-07-26 | Stop reason: SDUPTHER

## 2022-01-26 ASSESSMENT — PATIENT HEALTH QUESTIONNAIRE - PHQ9
SUM OF ALL RESPONSES TO PHQ QUESTIONS 1-9: 0
SUM OF ALL RESPONSES TO PHQ9 QUESTIONS 1 & 2: 0
SUM OF ALL RESPONSES TO PHQ QUESTIONS 1-9: 0
2. FEELING DOWN, DEPRESSED OR HOPELESS: 0
1. LITTLE INTEREST OR PLEASURE IN DOING THINGS: 0

## 2022-01-26 ASSESSMENT — ENCOUNTER SYMPTOMS
NAUSEA: 0
EYE DISCHARGE: 0
VOMITING: 0
SORE THROAT: 0
DIARRHEA: 0
ABDOMINAL PAIN: 0
EYE ITCHING: 0
EYE PAIN: 0
ANAL BLEEDING: 0
SHORTNESS OF BREATH: 0
COLOR CHANGE: 0
FACIAL SWELLING: 0
BLOOD IN STOOL: 0
RHINORRHEA: 0
CONSTIPATION: 0
PHOTOPHOBIA: 0
STRIDOR: 0
COUGH: 0
WHEEZING: 0
TROUBLE SWALLOWING: 0

## 2022-01-26 NOTE — PROGRESS NOTES
2022    Name: Aleshia Bowden : 1950 Sex: male  Age: 80 y.o. Subjective:  Chief Complaint   Patient presents with    Hypertension        Hyperlipidemia  Pertinent negatives include no chest pain, myalgias or shortness of breath. Hypertension  Pertinent negatives include no chest pain, headaches, palpitations or shortness of breath. Patient is a history of saddle embolus accompanied by cor pulmonale and pulmonary hypertension. This happened in . This was an unprovoked episode. He was treated by Dr. Kelvin Mars and followed up with Dr. Gretchen Joseph and Dr. Justo Stewart. He has been on Eliquis 5 mg twice daily since then. He saw Dr. Justo Stewart in the 2021 and I reviewed his reports. He recommended that Eliquis be cut back to 2.5 mg twice a day and continued indefinitely. He also recommended he stay on his beta-blocker and his ACE inhibitor. He had uncontrolled hypertension as well. Medications were adjusted, he is on lisinopril 30 mg daily and metoprolol 50 mg daily . He remains on mercaptopurine 50 mg twice daily Lialda 1.2 g 3 times daily Folvite thousand micrograms daily sertraline 100 mg daily tamsulosin 0.4 mg daily    He saw Dr. Kelvin Mars his vascular surgeon and reviewed report. Prior to that he had a CTA which showed bilateral pulmonary emboli. There is a left lower lobe opacity measured measuring 3.6 x 3.4 cm. Follow-up CT recommended in 3 months. He also had a 7 mm nonobstructive left renal calculus. Repeat ultrasound of his lower extremities showed thrombus within the left popliteal and peroneal veins. Echocardiogram repeated on 2020 showed normal left ventricle size with ejection fraction of 65%.   The right ventricle structure and function were normal.  He had mild to moderate aortic regurgitation with right ventricular systolic pressure at 41 mm    Repeat CT without contrast done by his pulmonologist on 2020 showed resolution of the soft tissue density of the left lower lobe  .  Patient has a history of ulcerative colitis under the care of Rojelio Viveros He had a colonoscopy done in June 2019. This showed diverticuli and mild inflammation. No significant change from previous study. He continues on mercaptopurine and Lialda. He is to have a recheck colonoscopy this year. He wants to wait until his GI doctor contacts him. He saw Dr. Carolan Schlatter yesterday. He does complain of urgency and frequency during the day with some dribbling. Does not have nocturia. Has a little bit of difficulty starting his urinary stream in the morning. Mely Suazo He saw Dr. Carolan Schlatter  who checked his PSA and started him on Flomax. Patient says the Flomax is not really helping his symptoms as well as he thought it should. I asked him to check with Dr. Sergio Farmer regarding the different medication. His last PSA January 2022 was 5.07 which is stable    Patient has a history of anxiety on Zoloft, hypertension, hyperlipidemia. He takes his medications as directed and has no problems. He has mild hyperglycemia with a fasting blood sugar of 109. Hemoglobin A1c in July 2021 was 5.6%. He has benign tremors which improve when he drinks alcohol. .    Reviewed immunizations and he is up-to-date on all his immunizations he had his flu shot on October 2021. In November 2021 he had Covid infection and received monoclonal antibodies. He cannot have his booster Covid shot until the end of February 2022. Mely Suazo He will be scheduled for annual wellness visit on in the near future. Review of Systems   Constitutional: Negative for appetite change, fatigue and unexpected weight change. HENT: Negative for congestion, ear pain, facial swelling, rhinorrhea, sore throat, tinnitus and trouble swallowing. Eyes: Negative for photophobia, pain, discharge, itching and visual disturbance. Respiratory: Negative for cough, shortness of breath, wheezing and stridor.          See HPI   Cardiovascular: Negative for chest pain, palpitations and leg swelling. Gastrointestinal: Negative for abdominal pain, anal bleeding, blood in stool, constipation, diarrhea, nausea and vomiting. Endocrine: Negative for cold intolerance, heat intolerance, polydipsia, polyphagia and polyuria. Genitourinary: Positive for frequency and urgency. Negative for difficulty urinating, dysuria, flank pain and hematuria. Less than before   Musculoskeletal: Negative for arthralgias, gait problem, joint swelling and myalgias. Skin: Negative for color change, pallor and rash. Nodule nontender left palm   Allergic/Immunologic: Negative for environmental allergies and food allergies. Neurological: Positive for tremors. Negative for dizziness, seizures, syncope, speech difficulty, weakness, light-headedness, numbness and headaches. Hematological: Negative for adenopathy. Does not bruise/bleed easily. Psychiatric/Behavioral: Negative for agitation, behavioral problems, confusion, sleep disturbance and suicidal ideas. The patient is not nervous/anxious.            Current Outpatient Medications:     sertraline (ZOLOFT) 100 MG tablet, Take 1 tablet by mouth daily, Disp: 90 tablet, Rfl: 1    metoprolol succinate (TOPROL XL) 50 MG extended release tablet, Take 1 tablet by mouth daily, Disp: 90 tablet, Rfl: 1    mercaptopurine (PURINETHOL) 50 MG chemo tablet, Take 1 tablet by mouth 2 times daily, Disp: 180 tablet, Rfl: 1    lisinopril (PRINIVIL;ZESTRIL) 30 MG tablet, Take 1 tablet by mouth daily, Disp: 90 tablet, Rfl: 1    LIALDA 1.2 g EC tablet, Take 1 tablet by mouth 3 times daily, Disp: 270 tablet, Rfl: 1    folic acid (FOLVITE) 1 MG tablet, Take 1 tablet by mouth daily, Disp: 90 tablet, Rfl: 1    atorvastatin (LIPITOR) 10 MG tablet, Take 1 tablet by mouth daily, Disp: 90 tablet, Rfl: 1    apixaban (ELIQUIS) 2.5 MG TABS tablet, Take 1 tablet by mouth 2 times daily, Disp: 180 tablet, Rfl: 1    tamsulosin (FLOMAX) 0.4 MG capsule, Take 0.4 mg by mouth daily, Disp: , Rfl:      No Known Allergies     Past Medical History:   Diagnosis Date    Anxiety 6/5/2019    [facility]     Essential hypertension 12/5/2017    Hx of blood clots 09/2020    PEs    Lung nodule 9/9/2020    Mixed hyperlipidemia 6/5/2019    SOB (shortness of breath)     Ulcerative colitis ([facility] 6/5/2019       Health Maintenance Due   Topic Date Due    Hepatitis C screen  Never done    Depression Screen  Never done    COVID-19 Vaccine (2 - Booster for [employer] series) 05/05/2021    Low dose CT lung screening  09/23/2021    Annual Wellness Visit (AWV)  10/08/2021        Patient Active Problem List   Diagnosis    Ulcerative colitis ([facility]    Anxiety    Mixed hyperlipidemia    Benign prostatic hyperplasia with urinary frequency    Pulmonary embolism, bilateral (HCC)    Tobacco dependence    Alcohol use    Essential hypertension    Chronic anticoagulation    COPD (chronic obstructive pulmonary disease) (HCC)    Elevated PSA    Hyperglycemia    History of pulmonary embolism    Aortic valve disease    Benign prostatic hyperplasia without lower urinary tract symptoms    Screening for lung cancer        Past Surgical History:   Procedure Laterality Date    COLONOSCOPY      DENTAL SURGERY      extraction    PULMONARY EMBOLISM LYSIS  09/02/2020    Dr Rosa Chin        Family History   Problem Relation Age of Onset    COPD Mother         Social History     Tobacco Use    Smoking status: Heavy Tobacco Smoker     Packs/day: 1.00     Years: 30.00     Pack years: 30.00     Types: Cigars, Cigarettes     Start date: 9/3/1969    Smokeless tobacco: Never Used    Tobacco comment: smoked about 1 ppd cigarettes for about 10-15 yrs, cigars about 5/week for past 20+ yrs   Vaping Use    Vaping Use: Never used   Substance Use Topics    Alcohol use:  Yes     Alcohol/week: 8.0 standard drinks     Types: 8 Cans of beer per week     Comment: coiple a day sometimes     Drug use: Never        Objective  Vitals:    01/26/22 0712   BP: 132/80   Pulse: 135   Resp: 16   SpO2: 96%   Weight: 206 lb (93.4 kg)   Height: 5' 9\" (1.753 m)        Exam:  Physical Exam  Vitals reviewed. Constitutional:       General: He is not in acute distress. Appearance: Normal appearance. He is well-developed. He is not ill-appearing. HENT:      Head: Normocephalic. Right Ear: External ear normal.      Left Ear: External ear normal.   Eyes:      General: No scleral icterus. Right eye: No discharge. Left eye: No discharge. Conjunctiva/sclera: Conjunctivae normal.      Pupils: Pupils are equal, round, and reactive to light. Neck:      Thyroid: No thyromegaly. Cardiovascular:      Rate and Rhythm: Normal rate and regular rhythm. Heart sounds: Normal heart sounds. No murmur heard. No friction rub. No gallop. Pulmonary:      Effort: Pulmonary effort is normal. No respiratory distress. Breath sounds: Normal breath sounds. No wheezing or rales. Chest:      Chest wall: No tenderness. Abdominal:      General: Bowel sounds are normal. There is no distension. Palpations: Abdomen is soft. There is no mass. Tenderness: There is no abdominal tenderness. There is no guarding or rebound. Musculoskeletal:         General: No tenderness or deformity. Normal range of motion. Cervical back: Normal range of motion and neck supple. Comments: In the middle of his left palm is an approximately 0.6% nodular lesion which is nontender to palpation. Lymphadenopathy:      Cervical: No cervical adenopathy. Skin:     General: Skin is warm and dry. Coloration: Skin is not pale. Findings: No erythema or rash. Neurological:      General: No focal deficit present. Mental Status: He is alert and oriented to person, place, and time. Cranial Nerves: No cranial nerve deficit. Sensory: No sensory deficit.       Deep Tendon Reflexes: Reflexes normal.   Psychiatric:         Mood and Affect: Mood normal.         Behavior: Behavior normal.         Thought Content: Thought content normal.         Judgment: Judgment normal.          Last labs reviewed. ASSESSMENT & PLAN :   Problem List        Circulatory    Pulmonary embolism, bilateral ([facility]       continue decreased dose of Eliquis 2.5 mg twice daily indefinitely unless he bleeds. Relevant Medications    apixaban (ELIQUIS) 2.5 MG TABS tablet    Essential hypertension - Primary     Blood pressures are stable. Continue medications and monitor blood pressures at home. Call office if systolics are over 935 over diastolics over 90. Relevant Medications    metoprolol succinate (TOPROL XL) 50 MG extended release tablet    lisinopril (PRINIVIL;ZESTRIL) 30 MG tablet       Respiratory    COPD (chronic obstructive pulmonary disease) ([facility]       doing well without any exacerbations. He does not use a rescue inhaler            Digestive    Ulcerative colitis ([facility]       continue medications and follow-up with Dr. Bindu Medrano         Relevant Medications    mercaptopurine (PURINETHOL) 50 MG chemo tablet    LIALDA 1.2 g EC tablet    folic acid (FOLVITE) 1 MG tablet       Other    Anxiety       continue sertraline         Relevant Medications    sertraline (ZOLOFT) 100 MG tablet    Mixed hyperlipidemia     Watch saturated fats in diet and will monitor lipids  continue atorvastatin 10 mg daily         Relevant Medications    metoprolol succinate (TOPROL XL) 50 MG extended release tablet    lisinopril (PRINIVIL;ZESTRIL) 30 MG tablet    atorvastatin (LIPITOR) 10 MG tablet    apixaban (ELIQUIS) 2.5 MG TABS tablet    Tobacco dependence       patient cutting back on his use of tobacco products. Hyperglycemia       very mild. Watch carbs and sweets in his diet         Screening for lung cancer       low-dose CT lung to screen for lung cancer.          Relevant Orders    CT LUNG

## 2022-01-26 NOTE — PATIENT INSTRUCTIONS
Sign up for My Chart  What is lung cancer screening? Lung cancer screening is a way in which doctors check the lungs for early signs of cancer in people who have no symptoms of lung cancer. A low-dose CT scan uses much less radiation than a normal CT scan and shows a more detailed image of the lungs than a standard X-ray. The goal of lung cancer screening is to find cancer early, before it has a chance to grow, spread, or cause problems. One large study found that smokers who were screened with low-dose CT scans were less likely to die of lung cancer than those who were screened with standard X-ray. Below is a summary of the things you need to know regarding screening for lung cancer with low-dose computed tomography (LDCT). This is a screening program that involves routine annual screening with LDCT studies of the lung. The LDCTs are done using low-dose radiation that is not thought to increase your cancer risk. If you have other serious medical conditions (other cancers, congestive heart failure) that limit your life expectancy to less than 10 years, you should not undergo lung cancer screening with LDCT. The chance is 20%-60% that the LDCT result will show abnormalities. This would require additional testing which could include repeat imaging or even invasive procedures. Most (about 95%) of \"abnormal\" LDCT results are false in the sense that no lung cancer is ultimately found. Additionally, some (about 10%) of the cancers found would not affect your life expectancy, even if undetected and untreated. If you are still smoking, the single most important thing that you can do to reduce your risk of dying of lung cancer is to quit. For this screening to be covered by Medicare and most other insurers, strict criteria must be met. If you do not meet these criteria, but still wish to undergo LDCT testing, you will be required to sign a waiver indicating your willingness to pay for the scan.

## 2022-01-26 NOTE — ASSESSMENT & PLAN NOTE
Blood pressures are stable. Continue medications and monitor blood pressures at home. Call office if systolics are over 734 over diastolics over 90.

## 2022-04-13 ENCOUNTER — TELEPHONE (OUTPATIENT)
Dept: PRIMARY CARE CLINIC | Age: 72
End: 2022-04-13

## 2022-04-13 NOTE — TELEPHONE ENCOUNTER
LMOM for patient letting him know that Dr Haja David signed the form he had dropped off and it has been placed in the folder up front for him to . Also advised if he wants it mailed, he can call to let us know.

## 2022-05-16 ENCOUNTER — TELEPHONE (OUTPATIENT)
Dept: PRIMARY CARE CLINIC | Age: 72
End: 2022-05-16

## 2022-05-16 NOTE — TELEPHONE ENCOUNTER
Last Appointment:  1/26/2022  Future Appointments   Date Time Provider Abrahan Cabrerai   7/26/2022  7:00 AM Matthew Hughes DO [facility]   7/26/2022  7:15 AM Matthew Hughes DO [facility]   9/28/2022  7:45 AM Clarke Smith MD [address] [facility]        Patient called the Eliquis will need PA. I started the PA.     Electronically signed by Noel Wooten LPN on 9/00/9361 at 14:77 PM

## 2022-06-29 ENCOUNTER — TELEPHONE (OUTPATIENT)
Dept: PRIMARY CARE CLINIC | Age: 72
End: 2022-06-29

## 2022-06-29 DIAGNOSIS — R73.9 HYPERGLYCEMIA: ICD-10-CM

## 2022-06-29 DIAGNOSIS — Z79.01 CHRONIC ANTICOAGULATION: ICD-10-CM

## 2022-06-29 DIAGNOSIS — E78.2 MIXED HYPERLIPIDEMIA: ICD-10-CM

## 2022-06-29 DIAGNOSIS — R97.20 ELEVATED PSA: ICD-10-CM

## 2022-06-29 DIAGNOSIS — I10 ESSENTIAL HYPERTENSION: Primary | ICD-10-CM

## 2022-06-29 DIAGNOSIS — K51.80 OTHER ULCERATIVE COLITIS WITHOUT COMPLICATION (HCC): ICD-10-CM

## 2022-06-29 NOTE — TELEPHONE ENCOUNTER
Spoke in length to patient today. In May, he gave us forms from [employer] to help him with tier exception for his Eliquis, Dr Fern Shannon filled out the forms and they were faxed on 05/18. Patient called today and said he is still paying $135 a month for the prescription. I reprinted the forms and faxed them again this morning. This afternoon Dr Fern Shannon received the same forms faxed back in her mailbox. I tried to call the number on the from of the forms and they told me it was the member line, could not talk to me, and would send me to the provider line. That gentleman could not find the member in their system by either the member number from the form or his name. I called the patient and advised that he needed to call the member number and inquire about the status of his form. We do not know why they were sent back with no explanation. He agreed to call to see where he can get with it.  Per Dr Fern Shannon, the next thing we can offer is the [address] [place] [address]. We can give the patient the number for this program.

## 2022-06-29 NOTE — TELEPHONE ENCOUNTER
Patient wants to know if you would put in an order for him to have bloodwork before his late July appt. If so, please place orders so he can have it done at the hospital. He also was asking about the forms that were supposed to be sent to help him with the coverage for his Eliquis. I advised that the forms were faxed on 05/18. I told him I would fax it again and when I have time, I will try to call when I have time to see if they actually did receive it.

## 2022-07-20 LAB
A/G RATIO: 1.3 RATIO (ref 1.1–2.2)
ALBUMIN SERPL-MCNC: 3.8 G/DL (ref 3.4–4.8)
ALP BLD-CCNC: 44 U/L (ref 42–121)
ALT SERPL-CCNC: 19 U/L (ref 10–63)
ANION GAP SERPL CALCULATED.3IONS-SCNC: 6 MEQ/L (ref 3–11)
AST SERPL-CCNC: 19 U/L (ref 10–41)
BASOPHILS ABSOLUTE: 0 K/UL (ref 0–0.2)
BASOPHILS RELATIVE PERCENT: 0.5 % (ref 0–1.5)
BILIRUB SERPL-MCNC: 0.7 MG/DL (ref 0.3–1.5)
BUN BLDV-MCNC: 21 MG/DL (ref 6–20)
CALCIUM SERPL-MCNC: 8.8 MG/DL (ref 8.5–10.5)
CHLORIDE BLD-SCNC: 106 MEQ/L (ref 98–107)
CHOLESTEROL: 185 MG/DL (ref 140–200)
CO2: 25 MEQ/L (ref 21–31)
CREAT SERPL-MCNC: 1.1 MG/DL (ref 0.6–1.3)
CREATININE + EGFR PANEL: 80 ML/MIN
DIAGNOSTIC PSA: 4.69 NG/ML (ref 0–4)
EOSINOPHILS ABSOLUTE: 0.1 K/UL (ref 0–0.33)
EOSINOPHILS RELATIVE PERCENT: 2.9 % (ref 0–3)
ESTIMATED AVERAGE GLUCOSE: 120 MG/DL
GFR NON-AFRICAN AMERICAN: 66 ML/MIN
GLOBULIN: 2.9 G/DL (ref 1.9–3.9)
GLUCOSE BLD-MCNC: 99 MG/DL (ref 70–99)
HBA1C MFR BLD: 5.8 % (ref 4–6)
HCT VFR BLD CALC: 40.7 % (ref 41–50)
HDLC SERPL-MCNC: 49 MG/DL (ref 29–71)
HEMOGLOBIN: 14.1 G/DL (ref 13.5–16.5)
LDL CHOLESTEROL: 106 MG/DL
LDL/HDL RATIO: 2.2 RATIO
LYMPHOCYTES ABSOLUTE: 0.9 K/UL (ref 1.1–4.8)
LYMPHOCYTES RELATIVE PERCENT: 22.8 % (ref 24–44)
MCH RBC QN AUTO: 33.8 PG (ref 28–34)
MCHC RBC AUTO-ENTMCNC: 34.6 G/DL (ref 33–37)
MCV RBC AUTO: 97.9 FL (ref 80–100)
MONOCYTES ABSOLUTE: 0.4 K/UL (ref 0.2–0.7)
MONOCYTES RELATIVE PERCENT: 9.3 % (ref 3.4–9)
NEUTROPHILS ABSOLUTE: 2.6 K/UL (ref 1.83–8.7)
PDW BLD-RTO: 14 % (ref 10.9–14.3)
PLATELET # BLD: 206 K/UL (ref 150–450)
PMV BLD AUTO: 8.4 FL (ref 7.4–10.4)
POTASSIUM SERPL-SCNC: 4.1 MEQ/L (ref 3.6–5)
RBC # BLD: 4.16 M/UL (ref 4.5–5.5)
SEGMENTED NEUTROPHILS RELATIVE PERCENT: 64.5 % (ref 40–74)
SODIUM BLD-SCNC: 137 MEQ/L (ref 135–145)
TOTAL PROTEIN: 6.7 G/DL (ref 5.9–7.8)
TRIGL SERPL-MCNC: 119 MG/DL (ref 41–189)
WBC # BLD: 4.1 K/UL (ref 4.5–11)

## 2022-07-26 ENCOUNTER — OFFICE VISIT (OUTPATIENT)
Dept: PRIMARY CARE CLINIC | Age: 72
End: 2022-07-26
Payer: MEDICARE

## 2022-07-26 VITALS
OXYGEN SATURATION: 98 % | TEMPERATURE: 97.5 F | WEIGHT: 205 LBS | SYSTOLIC BLOOD PRESSURE: 128 MMHG | DIASTOLIC BLOOD PRESSURE: 70 MMHG | BODY MASS INDEX: 30.27 KG/M2 | HEART RATE: 59 BPM

## 2022-07-26 DIAGNOSIS — I26.99 PULMONARY EMBOLISM, BILATERAL (HCC): ICD-10-CM

## 2022-07-26 DIAGNOSIS — R97.20 ELEVATED PSA: ICD-10-CM

## 2022-07-26 DIAGNOSIS — Z00.00 ROUTINE GENERAL MEDICAL EXAMINATION AT A HEALTH CARE FACILITY: ICD-10-CM

## 2022-07-26 DIAGNOSIS — F41.9 ANXIETY: ICD-10-CM

## 2022-07-26 DIAGNOSIS — E78.2 MIXED HYPERLIPIDEMIA: ICD-10-CM

## 2022-07-26 DIAGNOSIS — F17.200 TOBACCO DEPENDENCE: ICD-10-CM

## 2022-07-26 DIAGNOSIS — Z86.711 HISTORY OF PULMONARY EMBOLISM: ICD-10-CM

## 2022-07-26 DIAGNOSIS — Z00.00 MEDICARE ANNUAL WELLNESS VISIT, SUBSEQUENT: Primary | ICD-10-CM

## 2022-07-26 DIAGNOSIS — Z78.9 ALCOHOL USE: ICD-10-CM

## 2022-07-26 DIAGNOSIS — K51.80 OTHER ULCERATIVE COLITIS WITHOUT COMPLICATION (HCC): ICD-10-CM

## 2022-07-26 DIAGNOSIS — I10 ESSENTIAL HYPERTENSION: ICD-10-CM

## 2022-07-26 DIAGNOSIS — K62.5 RECTAL BLEEDING: ICD-10-CM

## 2022-07-26 DIAGNOSIS — I71.21 THORACIC ASCENDING AORTIC ANEURYSM: ICD-10-CM

## 2022-07-26 DIAGNOSIS — Z79.01 CHRONIC ANTICOAGULATION: Primary | ICD-10-CM

## 2022-07-26 PROCEDURE — 1123F ACP DISCUSS/DSCN MKR DOCD: CPT | Performed by: INTERNAL MEDICINE

## 2022-07-26 PROCEDURE — 99214 OFFICE O/P EST MOD 30 MIN: CPT | Performed by: INTERNAL MEDICINE

## 2022-07-26 PROCEDURE — G0439 PPPS, SUBSEQ VISIT: HCPCS | Performed by: INTERNAL MEDICINE

## 2022-07-26 RX ORDER — FOLIC ACID 1 MG/1
1000 TABLET ORAL DAILY
Qty: 90 TABLET | Refills: 1 | Status: SHIPPED | OUTPATIENT
Start: 2022-07-26

## 2022-07-26 RX ORDER — MESALAMINE 1.2 G/1
1.2 TABLET, DELAYED RELEASE ORAL 3 TIMES DAILY
Qty: 270 TABLET | Refills: 1 | Status: CANCELLED | OUTPATIENT
Start: 2022-07-26

## 2022-07-26 RX ORDER — MERCAPTOPURINE 50 MG/1
50 TABLET ORAL 2 TIMES DAILY
Qty: 180 TABLET | Refills: 1 | Status: SHIPPED | OUTPATIENT
Start: 2022-07-26

## 2022-07-26 RX ORDER — SERTRALINE HYDROCHLORIDE 100 MG/1
100 TABLET, FILM COATED ORAL DAILY
Qty: 90 TABLET | Refills: 1 | Status: SHIPPED | OUTPATIENT
Start: 2022-07-26

## 2022-07-26 RX ORDER — LISINOPRIL 30 MG/1
30 TABLET ORAL DAILY
Qty: 90 TABLET | Refills: 1 | Status: SHIPPED | OUTPATIENT
Start: 2022-07-26

## 2022-07-26 RX ORDER — METOPROLOL SUCCINATE 50 MG/1
50 TABLET, EXTENDED RELEASE ORAL DAILY
Qty: 90 TABLET | Refills: 1 | Status: SHIPPED | OUTPATIENT
Start: 2022-07-26

## 2022-07-26 RX ORDER — ATORVASTATIN CALCIUM 10 MG/1
10 TABLET, FILM COATED ORAL DAILY
Qty: 90 TABLET | Refills: 1 | Status: SHIPPED | OUTPATIENT
Start: 2022-07-26

## 2022-07-26 SDOH — HEALTH STABILITY: PHYSICAL HEALTH: ON AVERAGE, HOW MANY MINUTES DO YOU ENGAGE IN EXERCISE AT THIS LEVEL?: 20 MIN

## 2022-07-26 SDOH — HEALTH STABILITY: PHYSICAL HEALTH: ON AVERAGE, HOW MANY DAYS PER WEEK DO YOU ENGAGE IN MODERATE TO STRENUOUS EXERCISE (LIKE A BRISK WALK)?: 5 DAYS

## 2022-07-26 ASSESSMENT — LIFESTYLE VARIABLES
HOW MANY STANDARD DRINKS CONTAINING ALCOHOL DO YOU HAVE ON A TYPICAL DAY: 1 OR 2
HOW MANY STANDARD DRINKS CONTAINING ALCOHOL DO YOU HAVE ON A TYPICAL DAY: 1
HOW OFTEN DO YOU HAVE A DRINK CONTAINING ALCOHOL: 2-3 TIMES A WEEK
HOW OFTEN DO YOU HAVE SIX OR MORE DRINKS ON ONE OCCASION: 1
HOW OFTEN DO YOU HAVE A DRINK CONTAINING ALCOHOL: 4

## 2022-07-26 ASSESSMENT — PATIENT HEALTH QUESTIONNAIRE - PHQ9
SUM OF ALL RESPONSES TO PHQ QUESTIONS 1-9: 0
2. FEELING DOWN, DEPRESSED OR HOPELESS: 0
1. LITTLE INTEREST OR PLEASURE IN DOING THINGS: 0
SUM OF ALL RESPONSES TO PHQ9 QUESTIONS 1 & 2: 0

## 2022-07-26 ASSESSMENT — ENCOUNTER SYMPTOMS
DIARRHEA: 0
FACIAL SWELLING: 0
SORE THROAT: 0
SHORTNESS OF BREATH: 0
EYE ITCHING: 0
COLOR CHANGE: 0
ABDOMINAL PAIN: 0
TROUBLE SWALLOWING: 0
RHINORRHEA: 0
PHOTOPHOBIA: 0
COUGH: 0
EYE PAIN: 0
EYE DISCHARGE: 0
NAUSEA: 0
ANAL BLEEDING: 0
WHEEZING: 0
VOMITING: 0
CONSTIPATION: 0
STRIDOR: 0

## 2022-07-26 NOTE — PROGRESS NOTES
2022    Name: Meño Trujillo : 1950 Sex: male  Age: 80 y.o. Subjective:  Chief Complaint   Patient presents with    Hypertension        Hypertension  Pertinent negatives include no chest pain, headaches, palpitations or shortness of breath. Hyperlipidemia  Pertinent negatives include no chest pain, myalgias or shortness of breath. Patient is a history of saddle embolus accompanied by cor pulmonale and pulmonary hypertension. This happened in . This was an unprovoked episode. He was treated by Dr. Maria Del Rosario Lama and followed up with Dr. Aleta Boothe and Dr. Sridevi Go. He has been on Eliquis 5 mg twice daily since then. He saw Dr. Sridevi Go in the fall  and I reviewed his reports. He recommended that Eliquis be cut back to 2.5 mg twice a day and continued indefinitely. He also recommended he stay on his beta-blocker and his ACE inhibitor. He had uncontrolled hypertension as well. Medications were adjusted, he is on lisinopril 30 mg daily and metoprolol 50 mg daily . He remains on mercaptopurine 50 mg twice daily Lialda 1.2 g 3 times daily Folvite thousand micrograms daily sertraline 100 mg daily tamsulosin 0.4 mg daily blood pressures have improved    He saw Dr. Maria Del Rosario Lama his vascular surgeon and reviewed report. Prior to that he had a CTA which showed bilateral pulmonary emboli. There is a left lower lobe opacity measured measuring 3.6 x 3.4 cm. Follow-up CT recommended in 3 months. He also had a 7 mm nonobstructive left renal calculus. Repeat ultrasound of his lower extremities showed thrombus within the left popliteal and peroneal veins. Echocardiogram repeated on 2020 showed normal left ventricle size with ejection fraction of 65%.   The right ventricle structure and function were normal.  He had mild to moderate aortic regurgitation with right ventricular systolic pressure at 41 mm    Repeat CT without contrast done by his pulmonologist on 2020 showed resolution of the soft tissue density of the left lower lobe    CTA in March 2022 was lungs revealed aneurysmal dilatation of his ascending aorta 4.2 cm. This will be rechecked in September which should be 6 months from previous CTA and if it has increased in size we will refer him back to Dr. Vicky Royal  . Patient has a history of ulcerative colitis under the care of Emerson Terrazas He had a colonoscopy done in June 2019. This showed diverticuli and mild inflammation. No significant change from previous study. He continues on mercaptopurine and Lialda. He is to have a recheck colonoscopy this year. He had a few episodes of bright red rectal bleeding. He said it was when he wiped himself. He was not sure if his hemorrhoids were causing the problem. He has had no abdominal pain or weight loss. We will make sure Dr. Sena Bosch sees him in a timely fashion    He saw Dr. Ned Escobar   He does complain of urgency and frequency during the day with some dribbling. Does not have nocturia. Has a little bit of difficulty starting his urinary stream in the morning. Ramona Eli He saw Dr. Ned Escobar  who checked his PSA and started him on Flomax. Patient says the Flomax is not really helping his symptoms as well as he thought it should. I asked him to check with Dr. Bogdan Reza regarding the different medication. His last PSA January 2022 was 5.07 which is stable. Recheck PSA this July was still slightly elevated at 4.69 but better than it was last year    Patient has a history of anxiety on Zoloft, hypertension, hyperlipidemia. He takes his medications as directed and has no problems. His fasting CMP in July 2022 was acceptable    His fasting lipid profile July 2022 showed an LDL of 106, otherwise total cholesterol was good at 165 and triglycerides were 119    He has benign tremors which improve when he drinks alcohol. ..  Ask him how much alcohol he drinks it is reluctant to say but he does drink every day. He complains of some red spots close to his nose.   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 He was encouraged to cut back on his alcohol use    Reviewed immunizations and he is up-to-date on all his immunizations he had his flu shot on October 2021. In November 2021 he had Covid infection and received monoclonal antibodies. He had his booster shot and he did not have his second booster shot, he was encouraged to get it    Review of Systems   Constitutional:  Negative for appetite change, fatigue and unexpected weight change. HENT:  Negative for congestion, ear pain, facial swelling, rhinorrhea, sore throat, tinnitus and trouble swallowing. Eyes:  Negative for photophobia, pain, discharge, itching and visual disturbance. Respiratory:  Negative for cough, shortness of breath, wheezing and stridor. See HPI   Cardiovascular:  Negative for chest pain, palpitations and leg swelling. Gastrointestinal:  Negative for abdominal pain, anal bleeding, constipation, diarrhea, nausea and vomiting. Occasional rectal bleeding   Endocrine: Negative for cold intolerance, heat intolerance, polydipsia, polyphagia and polyuria. Genitourinary:  Positive for frequency and urgency. Negative for difficulty urinating, dysuria, flank pain and hematuria. Less than before   Musculoskeletal:  Negative for arthralgias, gait problem, joint swelling and myalgias. Foot pain   Skin:  Negative for color change, pallor and rash. Nodule nontender left palm   Allergic/Immunologic: Negative for environmental allergies and food allergies. Neurological:  Positive for tremors. Negative for dizziness, seizures, syncope, speech difficulty, weakness, light-headedness, numbness and headaches. Hematological:  Negative for adenopathy. Does not bruise/bleed easily. Psychiatric/Behavioral:  Negative for agitation, behavioral problems, confusion, sleep disturbance and suicidal ideas.  The patient Benign prostatic hyperplasia without lower urinary tract symptoms    Screening for lung cancer    Thoracic ascending aortic aneurysm [facility])    Rectal bleeding        Past Surgical History:   Procedure Laterality Date    COLONOSCOPY      DENTAL SURGERY      extraction    PULMONARY EMBOLISM LYSIS  09/02/2020    Dr Nuha Mcnamara        Family History   Problem Relation Age of Onset    COPD Mother         Social History     Tobacco Use    Smoking status: Heavy Smoker     Packs/day: 1.00     Years: 30.00     Pack years: 30.00     Types: Cigars, Cigarettes     Start date: 9/3/1969    Smokeless tobacco: Never    Tobacco comments:     smoked about 1 ppd cigarettes for about 10-15 yrs, cigars about 5/week for past 20+ yrs   Vaping Use    Vaping Use: Never used   Substance Use Topics    Alcohol use: Yes     Alcohol/week: 8.0 standard drinks     Types: 8 Cans of beer per week     Comment: coiple a day sometimes     Drug use: Never        Objective  Vitals:    07/26/22 0709   BP: 128/70   Pulse: 59   Temp: 97.5 °F (36.4 °C)   TempSrc: Temporal   SpO2: 98%   Weight: 205 lb (93 kg)        Exam:  Physical Exam  Vitals reviewed. Constitutional:       General: He is not in acute distress. Appearance: Normal appearance. He is well-developed. He is not ill-appearing. HENT:      Head: Normocephalic. Right Ear: External ear normal.      Left Ear: External ear normal.   Eyes:      General: No scleral icterus. Right eye: No discharge. Left eye: No discharge. Conjunctiva/sclera: Conjunctivae normal.      Pupils: Pupils are equal, round, and reactive to light. Neck:      Thyroid: No thyromegaly. Cardiovascular:      Rate and Rhythm: Normal rate and regular rhythm. Heart sounds: Normal heart sounds. No murmur heard. No friction rub. No gallop. Pulmonary:      Effort: Pulmonary effort is normal. No respiratory distress. Breath sounds: Normal breath sounds. No wheezing or rales.    Chest: Chest wall: No tenderness. Abdominal:      General: Bowel sounds are normal. There is no distension. Palpations: Abdomen is soft. There is no mass. Tenderness: There is no abdominal tenderness. There is no guarding or rebound. Musculoskeletal:         General: No tenderness or deformity. Normal range of motion. Cervical back: Normal range of motion and neck supple. Comments: In the middle of his left palm is an approximately 0.6% nodular lesion which is nontender to palpation. Lymphadenopathy:      Cervical: No cervical adenopathy. Skin:     General: Skin is warm and dry. Coloration: Skin is not pale. Findings: No erythema or rash. Comments: Flat reddish spots on nose and nasolabial areas   Neurological:      General: No focal deficit present. Mental Status: He is alert and oriented to person, place, and time. Cranial Nerves: No cranial nerve deficit. Sensory: No sensory deficit. Deep Tendon Reflexes: Reflexes normal.      Comments: Intention tremor   Psychiatric:         Mood and Affect: Mood normal.         Behavior: Behavior normal.         Thought Content: Thought content normal.         Judgment: Judgment normal.        Last labs reviewed. ASSESSMENT & PLAN :   Problem List          Circulatory    Thoracic ascending aortic aneurysm [facility])       CTA of his chest in September 2022 with referral to Dr. Tyra Hooker if it is increased in size         Relevant Medications    apixaban (ELIQUIS) 2.5 MG TABS tablet    atorvastatin (LIPITOR) 10 MG tablet    lisinopril (PRINIVIL;ZESTRIL) 30 MG tablet    metoprolol succinate (TOPROL XL) 50 MG extended release tablet    Other Relevant Orders    CTA CHEST W CONTRAST    Pulmonary embolism, bilateral ([address]       continue Eliquis         Relevant Medications    apixaban (ELIQUIS) 2.5 MG TABS tablet    Essential hypertension     Blood pressures are stable. Continue medications and monitor blood pressures at home.  Call office if systolics are over 814 over diastolics over 90. reviewed fasting CMP result         Relevant Medications    lisinopril (PRINIVIL;ZESTRIL) 30 MG tablet    metoprolol succinate (TOPROL XL) 50 MG extended release tablet       Digestive    Rectal bleeding       occasional, not mixed in with stool, usually seen on toilet paper, no accompanying abdominal pain. CBC was normal.  Referred to Dr. Dennie Kohler         Ulcerative colitis [facility])       continue yearly Aldara, 6-MP and folic acid. Follow-up with GI this year         Relevant Medications    LIALDA 1.2 g EC tablet    folic acid (FOLVITE) 1 MG tablet    mercaptopurine (PURINETHOL) 50 MG chemo tablet    Other Relevant Orders    Amb External Referral To Gastroenterology       Other    Anxiety       continue sertraline         Relevant Medications    sertraline (ZOLOFT) 100 MG tablet    Mixed hyperlipidemia     Watch saturated fats in diet and will monitor lipids  reviewed fasting lipids         Relevant Medications    apixaban (ELIQUIS) 2.5 MG TABS tablet    atorvastatin (LIPITOR) 10 MG tablet    lisinopril (PRINIVIL;ZESTRIL) 30 MG tablet    metoprolol succinate (TOPROL XL) 50 MG extended release tablet    Tobacco dependence       discussed need for him to stop smoking. He will try. He smokes cigars at least 1 a day         Alcohol use       discussed cutting back on alcohol use for general health         Chronic anticoagulation - Primary       continue Eliquis         Elevated PSA       monitor per urology         History of pulmonary embolism       continue Eliquis             Return in about 6 months (around 1/26/2023), or HTN, HL.        Ted Section, DO  7/26/2022  Low Dose CT (LDCT) Lung Screening criteria met:     Age 55-77(Medicare) or 50-80 (USPSTF)   Pack year smoking >30 (Medicare) or >20 (USPSTF)   Still smoking or less than 15 year since quit   No sign or symptoms of lung cancer   > 11 months since last LDCT     Risks and benefits of lung cancer screening with LDCT scans discussed:    Significance of positive screen - False-positive LDCT results often occur. 95% of all positive results do not lead to a diagnosis of cancer. Usually further imaging can resolve most false-positive results; however, some patients may require invasive procedures. Over diagnosis risk - 10% to 12% of screen-detected lung cancer cases are over diagnosed--that is, the cancer would not have been detected in the patient's lifetime without the screening. Need for follow up screens annually to continue lung cancer screening effectiveness     Risks associated with radiation from annual LDCT- Radiation exposure is about the same as for a mammogram, which is about 1/3 of the annual background radiation exposure from everyday life. Starting screening at age 54 is not likely to increase cancer risk from radiation exposure. Patients with comorbidities resulting in life expectancy of < 10 years, or that would preclude treatment of an abnormality identified on CT, should not be screened due to lack of benefit.     To obtain maximal benefit from this screening, smoking cessation and long-term abstinence from smoking is critical

## 2022-07-26 NOTE — PROGRESS NOTES
Medicare Annual Wellness Visit    Tj Ferguson is here for Medicare AW    Assessment & Plan    Recommendations for Preventive Services Due: see orders and patient instructions/AVS.  Recommended screening schedule for the next 5-10 years is provided to the patient in written form: see Patient Instructions/AVS.     No follow-ups on file. Subjective       Patient's complete Health Risk Assessment and screening values have been reviewed and are found in Flowsheets. The following problems were reviewed today and where indicated follow up appointments were made and/or referrals ordered.     Positive Risk Factor Screenings with Interventions:       Tobacco Use:  Tobacco Use: High Risk    Smoking Tobacco Use: Heavy Smoker    Smokeless Tobacco Use: Never     E-cigarette/Vaping       Questions Responses    E-cigarette/Vaping Use Never User    Start Date     Passive Exposure     Quit Date     Counseling Given     Comments           Substance Use - Tobacco Interventions:  patient is not ready to work toward tobacco cessation at this time         General Health and ACP:  General  In general, how would you say your health is?: Good  In the past 7 days, have you experienced any of the following: New or Increased Pain, New or Increased Fatigue, Loneliness, Social Isolation, Stress or Anger?: No  Do you get the social and emotional support that you need?: Yes  Do you have a Living Will?: Yes    Advance Directives       Power of  Living Will ACP-Advance Directive ACP-Power of     Not on File Not on File Not on File Not on File        General Health Risk Interventions:  No Living Will: ACP documents already completed- patient asked to provide copy to the office    Health Habits/Nutrition:  Physical Activity: Insufficiently Active    Days of Exercise per Week: 5 days    Minutes of Exercise per Session: 20 min     Have you lost any weight without trying in the past 3 months?: No     Have you seen the dentist within the past year?: Yes  Health Habits/Nutrition Interventions:  Nutritional issues:  educational materials for healthy, well-balanced diet provided     Safety:  Do you have working smoke detectors?: Yes  Do you have any tripping hazards - loose or unsecured carpets or rugs?: No  Do you have any tripping hazards - clutter in doorways, halls, or stairs?: No  Do you have either shower bars, grab bars, non-slip mats or non-slip surfaces in your shower or bathtub?: (!) No  Do all of your stairways have a railing or banister?: Yes  Do you always fasten your seatbelt when you are in a car?: Yes  Safety Interventions: Will install grab bars in bathroom           Objective   There were no vitals filed for this visit. There is no height or weight on file to calculate BMI. No Known Allergies  Prior to Visit Medications    Medication Sig Taking?  Authorizing Provider   sertraline (ZOLOFT) 100 MG tablet Take 1 tablet by mouth daily Yes Mary Hogan DO   metoprolol succinate (TOPROL XL) 50 MG extended release tablet Take 1 tablet by mouth daily Yes Mary Hgoan DO   mercaptopurine (PURINETHOL) 50 MG chemo tablet Take 1 tablet by mouth 2 times daily Yes Mary Hogan DO   lisinopril (PRINIVIL;ZESTRIL) 30 MG tablet Take 1 tablet by mouth daily Yes Mary Hogan DO   LIALDA 1.2 g EC tablet Take 1 tablet by mouth 3 times daily Yes Mary Hogan DO   folic acid (FOLVITE) 1 MG tablet Take 1 tablet by mouth daily Yes Mary Hogan DO   atorvastatin (LIPITOR) 10 MG tablet Take 1 tablet by mouth daily Yes Mary Hogan DO   apixaban (ELIQUIS) 2.5 MG TABS tablet Take 1 tablet by mouth 2 times daily Yes Mary Hogan DO   tamsulosin (FLOMAX) 0.4 MG capsule Take 0.4 mg by mouth daily Yes Historical Provider, MD Nixon (Including outside providers/suppliers regularly involved in providing care):   Patient Care Team:  Susan Ortega DO as PCP - General (Internal Medicine)  Susan Ortega DO as PCP - REHABILITATION [facility] Empaneled Provider  Edda Walter MD as Consulting Physician (Cardiology)  Heather Bernardo MD as Consulting Physician (Pulmonary Disease)  Martha Milligan MD as Consulting Physician (Hematology and Oncology)     Reviewed and updated this visit:  Meds              Cardiovascular Disease Risk Counseling: Assessed the patient's risk to develop cardiovascular disease and reviewed main risk factors. Reviewed steps to reduce disease risk including:   Quitting tobacco use, reducing amount smoked, or not starting the habit  Making healthy food choices  Being physically active and gradualy increasing activity levels   Reduce weight and determine a healthy BMI goal  Monitor blood pressure and treat if higher than 140/90 mmHg  Maintain blood total cholesterol levels under 5 mmol/l or 190 mg/dl  Maintain LDL cholesterol levels under 3.0 mmol/l or 115 mg/dl   Control blood glucose levels  Consider taking aspirin (75 mg daily), once blood pressure is controlled   Provided a follow up plan. Time spent (minutes): 5  Obesity Counseling: Assessed behavioral health risks and factors affecting choice of behavior. Suggested weight control approaches, including dietary changes behavioral modification and follow up plan. Provided educational and support documentation. Time spent (minutes): 5Tobacco Cessation Counseling: Patient advised about behavior change, including information about personal health harms, usage of appropriate cessation measures and benefits of cessation.   Time spent (minutes): 5

## 2022-07-26 NOTE — ASSESSMENT & PLAN NOTE
occasional, not mixed in with stool, usually seen on toilet paper, no accompanying abdominal pain.   CBC was normal.  Referred to Dr. Zenaida Ash

## 2022-07-26 NOTE — PATIENT INSTRUCTIONS
Learning About Low-Carbohydrate Diets  What is a low-carbohydrate diet? A low-carbohydrate (or \"low-carb\") diet limits foods and drinks that have carbohydrates. This includes grains, fruits, milk and yogurt, and starchy vegetables like potatoes, beans, and corn. It also avoids foods and drinks that have added sugar. Instead, low-carb diets include foods that are high inprotein and fat. Why might you follow a low-carb diet? Low-carb diets may be used for a variety of reasons, such as for weight loss. People who have diabetes may use a low-carb diet to help manage their bloodsugar levels. What should you do before you start the diet? Talk to your doctor before you try any diet. This is even more important if you have health problems like kidney disease, heart disease, or diabetes. Your doctor may suggest that you meet with a registered dietitian. A dietitian canhelp you make an eating plan that works for you. What foods do you eat on a low-carb diet? On a low-carb diet, you choose foods that are high in protein and fat. Examplesof these are:  Meat, poultry, and fish. Eggs. Nuts, such as walnuts, pecans, almonds, and peanuts. Peanut butter and other nut butters. Tofu. Avocado. Layo Dart. Non-starchy vegetables like broccoli, cauliflower, green beans, mushrooms, peppers, lettuce, and spinach. Unsweetened non-dairy milks like almond milk and coconut milk. Cheese, cottage cheese, and cream cheese. Where can you learn more? Go to https://Fitzealpadmini.Vubiquity. org and sign in to your [employer] account. Enter C335 in the [facility] box to learn more about \"Learning About Low-Carbohydrate Diets. \"     If you do not have an account, please click on the \"Sign Up Now\" link. Current as of: September 8, 2021               Content Version: 13.3  © 3520-6376 Healthwise, Incorporated. Care instructions adapted under license by [address]  If you have questions about a medical condition or this instruction, always ask your healthcare professional. [place] [address] any warranty or liability for your use of this information. Personalized Preventive Plan for Elsa Barrett - 7/26/2022  Medicare offers a range of preventive health benefits. Some of the tests and screenings are paid in full while other may be subject to a deductible, co-insurance, and/or copay. Some of these benefits include a comprehensive review of your medical history including lifestyle, illnesses that may run in your family, and various assessments and screenings as appropriate. After reviewing your medical record and screening and assessments performed today your provider may have ordered immunizations, labs, imaging, and/or referrals for you. A list of these orders (if applicable) as well as your Preventive Care list are included within your After Visit Summary for your review. Other Preventive Recommendations:    A preventive eye exam performed by an eye specialist is recommended every 1-2 years to screen for glaucoma; cataracts, macular degeneration, and other eye disorders. A preventive dental visit is recommended every 6 months. Try to get at least 150 minutes of exercise per week or 10,000 steps per day on a pedometer . Order or download the FREE \"Exercise & Physical Activity: Your Everyday Guide\" from [employer]. Call 5-742.198.5288 or search [employer] online. You need 3140-7710 mg of calcium and 6425-3350 IU of vitamin D per day. It is possible to meet your calcium requirement with diet alone, but a vitamin D supplement is usually necessary to meet this goal.  When exposed to the sun, use a sunscreen that protects against both UVA and UVB radiation with an SPF of 30 or greater. Reapply every 2 to 3 hours or after sweating, drying off with a towel, or swimming. Always wear a seat belt when traveling in a car.  Always wear a helmet when riding a bicycle or motorcycle.

## 2022-10-04 ENCOUNTER — OFFICE VISIT (OUTPATIENT)
Dept: CARDIOLOGY CLINIC | Age: 72
End: 2022-10-04
Payer: MEDICARE

## 2022-10-04 VITALS
HEART RATE: 58 BPM | HEIGHT: 69 IN | BODY MASS INDEX: 31.34 KG/M2 | SYSTOLIC BLOOD PRESSURE: 159 MMHG | RESPIRATION RATE: 18 BRPM | DIASTOLIC BLOOD PRESSURE: 83 MMHG | WEIGHT: 211.6 LBS

## 2022-10-04 DIAGNOSIS — I77.810 THORACIC AORTIC ECTASIA (HCC): ICD-10-CM

## 2022-10-04 DIAGNOSIS — I35.9 AORTIC VALVE DISEASE: Primary | ICD-10-CM

## 2022-10-04 PROBLEM — I26.99 PULMONARY EMBOLISM, BILATERAL (HCC): Status: RESOLVED | Noted: 2020-09-02 | Resolved: 2022-10-04

## 2022-10-04 PROBLEM — K62.5 RECTAL BLEEDING: Status: RESOLVED | Noted: 2022-07-26 | Resolved: 2022-10-04

## 2022-10-04 PROBLEM — Z79.01 CHRONIC ANTICOAGULATION: Status: RESOLVED | Noted: 2020-12-21 | Resolved: 2022-10-04

## 2022-10-04 PROBLEM — Z12.2 SCREENING FOR LUNG CANCER: Status: RESOLVED | Noted: 2022-01-26 | Resolved: 2022-10-04

## 2022-10-04 PROCEDURE — 4004F PT TOBACCO SCREEN RCVD TLK: CPT | Performed by: INTERNAL MEDICINE

## 2022-10-04 PROCEDURE — 3017F COLORECTAL CA SCREEN DOC REV: CPT | Performed by: INTERNAL MEDICINE

## 2022-10-04 PROCEDURE — 99213 OFFICE O/P EST LOW 20 MIN: CPT | Performed by: INTERNAL MEDICINE

## 2022-10-04 PROCEDURE — G8484 FLU IMMUNIZE NO ADMIN: HCPCS | Performed by: INTERNAL MEDICINE

## 2022-10-04 PROCEDURE — 1123F ACP DISCUSS/DSCN MKR DOCD: CPT | Performed by: INTERNAL MEDICINE

## 2022-10-04 PROCEDURE — G8427 DOCREV CUR MEDS BY ELIG CLIN: HCPCS | Performed by: INTERNAL MEDICINE

## 2022-10-04 PROCEDURE — G8417 CALC BMI ABV UP PARAM F/U: HCPCS | Performed by: INTERNAL MEDICINE

## 2022-10-04 PROCEDURE — 93000 ELECTROCARDIOGRAM COMPLETE: CPT | Performed by: INTERNAL MEDICINE

## 2022-10-04 NOTE — PROGRESS NOTES
Chief Complaint   Patient presents with    Valvular Heart Disease       Patient Active Problem List    Diagnosis Date Noted    Thoracic aortic ectasia ([facility] 07/26/2022     Overview Note:     A. CT scan 2022: 4.2 cm      Aortic valve disease 09/29/2021     Overview Note:     A. Echo 2020:  Mild to moderate AI, trileaflet valve, normal LV size and EF       Hyperglycemia 07/21/2021    History of pulmonary embolism 07/21/2021    COPD (chronic obstructive pulmonary disease) (HCC)     Elevated PSA     Tobacco dependence 09/02/2020    Alcohol use 09/02/2020    Ulcerative colitis ([facility] 06/05/2019    Anxiety 06/05/2019    Mixed hyperlipidemia 06/05/2019    Essential hypertension 12/05/2017       Current Outpatient Medications   Medication Sig Dispense Refill    apixaban (ELIQUIS) 2.5 MG TABS tablet Take 1 tablet by mouth in the morning and 1 tablet before bedtime. 180 tablet 1    atorvastatin (LIPITOR) 10 MG tablet Take 1 tablet by mouth in the morning. 90 tablet 1    folic acid (FOLVITE) 1 MG tablet Take 1 tablet by mouth in the morning. 90 tablet 1    lisinopril (PRINIVIL;ZESTRIL) 30 MG tablet Take 1 tablet by mouth in the morning. 90 tablet 1    mercaptopurine (PURINETHOL) 50 MG chemo tablet Take 1 tablet by mouth in the morning and 1 tablet before bedtime. 180 tablet 1    metoprolol succinate (TOPROL XL) 50 MG extended release tablet Take 1 tablet by mouth in the morning. 90 tablet 1    sertraline (ZOLOFT) 100 MG tablet Take 1 tablet by mouth in the morning. 90 tablet 1    LIALDA 1.2 g EC tablet Take 1 tablet by mouth 3 times daily 270 tablet 1    tamsulosin (FLOMAX) 0.4 MG capsule Take 0.8 mg by mouth daily       No current facility-administered medications for this visit.         No Known Allergies    Vitals:    10/04/22 0757   BP: (!) 159/83   Pulse: 58   Resp: 18   Weight: 211 lb 9.6 oz (96 kg)   Height: 5' 9\" (1.753 m)               SUBJECTIVE: Trev Luna presents to the office today for followup of chronic cardiac diagnoses. In 2020 presented with severe SOB and was found to have a submassive PE treated with EKOS by dr. Ivonne Munguia.  Echo at that time showed severe RV enlargement and dysfunction and RVSP > 100. Author Christopher Felt to be \"unprovoked\" by pulmonary and chronic anticoagulation recommended by dr Jammie Jimenez. He complains of no more dyspnea and denies   chest pain, chest pressure/discomfort, claudication, exertional chest pressure/discomfort, fatigue, irregular heart beat, lower extremity edema, near-syncope, orthopnea, palpitations, paroxysmal nocturnal dyspnea, syncope and tachypnea. Walks 2 miles a day, does home chores. Had screening CT which showed mild aortic ectasia        Physical Exam   BP (!) 159/83   Pulse 58   Resp 18   Ht 5' 9\" (1.753 m)   Wt 211 lb 9.6 oz (96 kg)   BMI 31.25 kg/m²      Constitutional: Oriented to person, place, and time. Well-developed and well-nourished. No distress. Neck: Normal range of motion. Neck supple. No hepatojugular reflux and no JVD present. Carotid bruit is not present. No tracheal deviation present. No thyromegaly present. Cardiovascular: Normal rate, regular rhythm, normal heart sounds and intact distal pulses. Exam reveals no gallop and no friction rub. NO murmur heard. Pulmonary/Chest: Effort normal and breath sounds normal. No respiratory distress. No wheezes. No rales. No tenderness. Abdominal: Soft. Bowel sounds are normal. No distension and no mass. No tenderness. No rebound and no guarding. Musculoskeletal: Normal range of motion. No edema and no tenderness. Neurological: Alert and oriented to person, place, and time. Skin: Skin is warm and dry. No rash noted. Not diaphoretic. No erythema. Psychiatric: Normal mood and affect.  Behavior is normal.     EKG:  normal sinus rhythm, normal    ASSESSMENT AND PLAN:  Patient Active Problem List   Diagnosis    Essential hypertension    Ulcerative colitis     Anxiety    Mixed hyperlipidemia    Mixed aortic valve disease    Benign prostatic hyperplasia with urinary frequency    Hx of unprovoked pulmonary embolism     COPD (chronic obstructive pulmonary disease)     Doing well post unprovoked submassive PE with EKOS lysis in 2020 - no symptoms / signs c/w development of CTEPH  On [facility] for unprovoked event  Has mild to moderate AI with trileaflet valve on 2020 echo, no murmur today  Mild thoracic aorta ectasia      OV one year        Alicja Conde M.D  [facility]

## 2022-10-24 NOTE — PROGRESS NOTES
Subjective: The patient is awake and alert in bed, has no complaints. No pain. No GI or  blood loss. No fevers. No problems overnight. Denies chest pain, angina, dyspnea or abdominal discomfort. No nausea or vomiting. Tolerating diet. Objective:    /78   Pulse 66   Temp 98.4 °F (36.9 °C) (Temporal)   Resp 15   Ht 5' 9\" (1.753 m)   Wt 205 lb (93 kg)   SpO2 96%   BMI 30.27 kg/m²     General: NAD  HEENT: No thrush or mucositis, EOMI, PERRLA  Heart:  RRR, no murmurs, gallops, or rubs.   Lungs:  CTA bilaterally, no wheeze, rales or rhonchi  Abd: bowel sounds present, nontender, nondistended, no masses  Extrem:  No clubbing, cyanosis, or edema  Lymphatics: No palpable adenopathy in cervical and supraclavicular regions  Skin: Intact, no petechia or purpura     CBC with Differential:    Lab Results   Component Value Date    WBC 5.1 09/04/2020    RBC 4.52 09/04/2020    HGB 14.9 09/04/2020    HCT 43.4 09/04/2020     09/04/2020    MCV 96.0 09/04/2020    MCH 33.0 09/04/2020    MCHC 34.3 09/04/2020    RDW 13.6 09/04/2020    SEGSPCT 64.8 12/04/2019    LYMPHOPCT 12.1 09/02/2020    MONOPCT 2.2 09/02/2020    BASOPCT 0.1 09/02/2020    MONOSABS 0.17 09/02/2020    LYMPHSABS 0.93 09/02/2020    EOSABS 0.07 09/02/2020    BASOSABS 0.01 09/02/2020     CMP:    Lab Results   Component Value Date     09/04/2020    K 4.1 09/04/2020    K 4.2 09/02/2020     09/04/2020    CO2 23 09/04/2020    BUN 13 09/04/2020    CREATININE 0.9 09/04/2020    GFRAA >60 09/04/2020    AGRATIO 1.1 12/04/2019    LABGLOM >60 09/04/2020    GLUCOSE 107 09/04/2020    PROT 6.6 09/03/2020    LABALBU 3.7 09/03/2020    CALCIUM 9.1 09/04/2020    BILITOT 1.0 09/03/2020    ALKPHOS 58 09/03/2020    AST 37 09/03/2020    ALT 64 09/03/2020        Current Facility-Administered Medications:     atorvastatin (LIPITOR) tablet 10 mg, 10 mg, Oral, Daily, Anderson Castillo MD, 10 mg at 09/04/20 0845    mesalamine (DELZICOL) delayed release capsule 400 mg, 400 mg, Oral, TID, Sheryl Cespedes MD, 400 mg at 09/04/20 0845    lisinopril (PRINIVIL;ZESTRIL) tablet 10 mg, 10 mg, Oral, Daily, Sheryl Cespedes MD, 10 mg at 09/04/20 0845    mercaptopurine (PURINETHOL) chemo tablet 50 mg, 50 mg, Oral, BID, Sheryl Cespedes MD    metoprolol succinate (TOPROL XL) extended release tablet 25 mg, 25 mg, Oral, Daily, Sheryl Cespedes MD, 25 mg at 09/04/20 0844    sertraline (ZOLOFT) tablet 100 mg, 100 mg, Oral, Daily, Sheryl Cespedes MD, 100 mg at 09/04/20 0845    tamsulosin (FLOMAX) capsule 0.4 mg, 0.4 mg, Oral, Daily, Sheryl Cespedes MD, 0.4 mg at 09/04/20 0844    hydrALAZINE (APRESOLINE) injection 10 mg, 10 mg, Intravenous, Q6H PRN, GRACE Rome CNP    apixaban (ELIQUIS) tablet 10 mg, 10 mg, Oral, BID, 10 mg at 09/04/20 0844 **FOLLOWED BY** [START ON 9/10/2020] apixaban (ELIQUIS) tablet 5 mg, 5 mg, Oral, BID, GRACE Julio CNP    sodium chloride flush 0.9 % injection 10 mL, 10 mL, Intravenous, 2 times per day, Geovanna Matthews MD, 10 mL at 09/04/20 0844    sodium chloride flush 0.9 % injection 10 mL, 10 mL, Intravenous, PRN, Geovanna Matthews MD    acetaminophen (TYLENOL) tablet 650 mg, 650 mg, Oral, Q4H PRN, Geovanna Matthews MD, 650 mg at 09/03/20 0148    ondansetron (ZOFRAN) injection 4 mg, 4 mg, Intravenous, Q6H PRN, Geovanna Matthews MD    Assessment:    Principal Problem:    Acute saddle pulmonary embolism with acute cor pulmonale (HCC)  Active Problems:    Uncontrolled hypertension    Ulcerative colitis (HCC)    Anxiety    Mixed hyperlipidemia    Elevated PSA    Benign prostatic hyperplasia with urinary frequency    Shortness of breath    COPD (chronic obstructive pulmonary disease) (HCC)    Nodular radiologic density    Abnormal liver enzymes    Hyperglycemia    Metabolic acidosis    Tobacco dependence    Alcohol use    SIRS (systemic inflammatory response syndrome) (HCC)    Cor pulmonale (chronic) ([facility]  Resolved Problems:    * No resolved hospital problems. *    60-year-old male who presented with shortness of breath CTA showed massive PE with evidence of right heart strain. Patient is status post Catheter directed lysis of PE via EKOS on 8/2. We were subsequently consulted for unprovoked PE.        Plan:  -Hypercoagulable work-up sent off and pending.   -Patient will be on indefinite anticoagulation given size of unprovoked PE.  -Continue Eliquis, maintain platelets >04H while on anticoagulation  -Patient will get repeat CT chest in 2-3 weeks to see if he will need bronchoscopy or not  -Patient can follow up with Dr. Edy Mora at discharge  Electronically signed by GRACE Zfaar NP on 9/4/2020 at 11:01 AM     Attending Addendum:     Patient seen and examined personally. Reviewed pertinent labs and imaging reports. Progress note has been updated to reflect my changes. Agree with the note above. Indefinite anticoagulation. Can follow-up on hypercoagulable studies as outpatient.     Iban Barba MD  Medical Oncologist/Hematologist  [facility] for [address] Glycopyrrolate Counseling:  I discussed with the patient the risks of glycopyrrolate including but not limited to skin rash, drowsiness, dry mouth, difficulty urinating, and blurred vision.

## 2023-01-17 ENCOUNTER — TELEPHONE (OUTPATIENT)
Dept: PRIMARY CARE CLINIC | Age: 73
End: 2023-01-17

## 2023-01-17 DIAGNOSIS — Z79.01 CHRONIC ANTICOAGULATION: Primary | ICD-10-CM

## 2023-01-17 DIAGNOSIS — Z86.711 HISTORY OF PULMONARY EMBOLISM: ICD-10-CM

## 2023-01-18 LAB
A/G RATIO: 1.1 RATIO (ref 1.1–2.2)
ALBUMIN SERPL-MCNC: 3.9 G/DL (ref 3.4–4.8)
ALP BLD-CCNC: 41 U/L (ref 42–121)
ALT SERPL-CCNC: 21 U/L (ref 10–63)
ANION GAP SERPL CALCULATED.3IONS-SCNC: 6 MEQ/L (ref 3–11)
AST SERPL-CCNC: 19 U/L (ref 10–41)
BASOPHILS ABSOLUTE: 0 K/UL (ref 0–0.2)
BASOPHILS RELATIVE PERCENT: 0.5 % (ref 0–1.5)
BILIRUB SERPL-MCNC: 0.8 MG/DL (ref 0.3–1.5)
BUN BLDV-MCNC: 18 MG/DL (ref 6–20)
CALCIUM SERPL-MCNC: 9 MG/DL (ref 8.5–10.5)
CHLORIDE BLD-SCNC: 105 MEQ/L (ref 98–107)
CHOLESTEROL: 179 MG/DL (ref 140–200)
CO2: 23 MEQ/L (ref 21–31)
CREAT SERPL-MCNC: 1 MG/DL (ref 0.6–1.3)
CREATININE + EGFR PANEL: 89 ML/MIN
DIAGNOSTIC PSA: 5.35 NG/ML (ref 0–4)
EOSINOPHILS ABSOLUTE: 0.1 K/UL (ref 0–0.33)
EOSINOPHILS RELATIVE PERCENT: 2.9 % (ref 0–3)
ESTIMATED AVERAGE GLUCOSE: 117 MG/DL
GFR NON-AFRICAN AMERICAN: 74 ML/MIN
GLOBULIN: 3.6 G/DL (ref 1.9–3.9)
GLUCOSE BLD-MCNC: 108 MG/DL (ref 70–99)
HBA1C MFR BLD: 5.7 % (ref 4–6)
HCT VFR BLD CALC: 42.8 % (ref 41–50)
HDLC SERPL-MCNC: 52 MG/DL (ref 29–71)
HEMOGLOBIN: 14.3 G/DL (ref 13.5–16.5)
INR BLD: 1.22 (ref 0.8–2)
LDL CHOLESTEROL: 111 MG/DL
LDL/HDL RATIO: 2.1 RATIO
LYMPHOCYTES ABSOLUTE: 1 K/UL (ref 1.1–4.8)
LYMPHOCYTES RELATIVE PERCENT: 25.8 % (ref 24–44)
MCH RBC QN AUTO: 33 PG (ref 28–34)
MCHC RBC AUTO-ENTMCNC: 33.5 G/DL (ref 33–37)
MCV RBC AUTO: 98.4 FL (ref 80–100)
MONOCYTES ABSOLUTE: 0.4 K/UL (ref 0.2–0.7)
MONOCYTES RELATIVE PERCENT: 10 % (ref 3.4–9)
NEUTROPHILS ABSOLUTE: 2.3 K/UL (ref 1.83–8.7)
PDW BLD-RTO: 14.9 % (ref 10.9–14.3)
PLATELET # BLD: 212 K/UL (ref 150–450)
PMV BLD AUTO: 8.4 FL (ref 7.4–10.4)
POTASSIUM SERPL-SCNC: 4.1 MEQ/L (ref 3.6–5)
PT: 13.6 S (ref 9.5–12.9)
RBC # BLD: 4.35 M/UL (ref 4.5–5.5)
SEGMENTED NEUTROPHILS RELATIVE PERCENT: 60.8 % (ref 40–74)
SODIUM BLD-SCNC: 134 MEQ/L (ref 135–145)
TOTAL PROTEIN: 7.5 G/DL (ref 5.9–7.8)
TRIGL SERPL-MCNC: 86 MG/DL (ref 41–189)
WBC # BLD: 3.8 K/UL (ref 4.5–11)

## 2023-01-25 ENCOUNTER — OFFICE VISIT (OUTPATIENT)
Dept: PRIMARY CARE CLINIC | Age: 73
End: 2023-01-25

## 2023-01-25 ENCOUNTER — TELEPHONE (OUTPATIENT)
Dept: PRIMARY CARE CLINIC | Age: 73
End: 2023-01-25

## 2023-01-25 VITALS
DIASTOLIC BLOOD PRESSURE: 74 MMHG | WEIGHT: 213 LBS | SYSTOLIC BLOOD PRESSURE: 120 MMHG | BODY MASS INDEX: 31.55 KG/M2 | OXYGEN SATURATION: 98 % | HEIGHT: 69 IN | HEART RATE: 56 BPM | TEMPERATURE: 97.3 F

## 2023-01-25 DIAGNOSIS — J44.9 CHRONIC OBSTRUCTIVE PULMONARY DISEASE, UNSPECIFIED COPD TYPE (HCC): ICD-10-CM

## 2023-01-25 DIAGNOSIS — F41.9 ANXIETY: ICD-10-CM

## 2023-01-25 DIAGNOSIS — I77.810 THORACIC AORTIC ECTASIA (HCC): ICD-10-CM

## 2023-01-25 DIAGNOSIS — I10 ESSENTIAL HYPERTENSION: ICD-10-CM

## 2023-01-25 DIAGNOSIS — Z86.711 HISTORY OF PULMONARY EMBOLISM: Primary | ICD-10-CM

## 2023-01-25 DIAGNOSIS — K51.80 OTHER ULCERATIVE COLITIS WITHOUT COMPLICATION (HCC): ICD-10-CM

## 2023-01-25 DIAGNOSIS — R73.9 HYPERGLYCEMIA: ICD-10-CM

## 2023-01-25 DIAGNOSIS — Z79.01 CHRONIC ANTICOAGULATION: ICD-10-CM

## 2023-01-25 DIAGNOSIS — E78.2 MIXED HYPERLIPIDEMIA: ICD-10-CM

## 2023-01-25 DIAGNOSIS — R97.20 ELEVATED PSA: ICD-10-CM

## 2023-01-25 PROBLEM — I26.99 PULMONARY EMBOLISM, BILATERAL (HCC): Status: ACTIVE | Noted: 2022-01-26

## 2023-01-25 PROBLEM — I26.99 PULMONARY EMBOLISM, BILATERAL (HCC): Status: RESOLVED | Noted: 2020-09-02 | Resolved: 2023-01-25

## 2023-01-25 RX ORDER — ATORVASTATIN CALCIUM 10 MG/1
10 TABLET, FILM COATED ORAL DAILY
Qty: 90 TABLET | Refills: 1 | Status: SHIPPED | OUTPATIENT
Start: 2023-01-25

## 2023-01-25 RX ORDER — SERTRALINE HYDROCHLORIDE 100 MG/1
100 TABLET, FILM COATED ORAL DAILY
Qty: 90 TABLET | Refills: 1 | Status: SHIPPED | OUTPATIENT
Start: 2023-01-25

## 2023-01-25 RX ORDER — MESALAMINE 1.2 G/1
1.2 TABLET, DELAYED RELEASE ORAL 3 TIMES DAILY
Qty: 270 TABLET | Refills: 1 | Status: SHIPPED | OUTPATIENT
Start: 2023-01-25

## 2023-01-25 RX ORDER — FOLIC ACID 1 MG/1
1000 TABLET ORAL DAILY
Qty: 90 TABLET | Refills: 1 | Status: SHIPPED | OUTPATIENT
Start: 2023-01-25

## 2023-01-25 RX ORDER — METOPROLOL SUCCINATE 50 MG/1
50 TABLET, EXTENDED RELEASE ORAL DAILY
Qty: 90 TABLET | Refills: 1 | Status: SHIPPED | OUTPATIENT
Start: 2023-01-25

## 2023-01-25 RX ORDER — LISINOPRIL 30 MG/1
30 TABLET ORAL DAILY
Qty: 90 TABLET | Refills: 1 | Status: SHIPPED | OUTPATIENT
Start: 2023-01-25

## 2023-01-25 RX ORDER — MERCAPTOPURINE 50 MG/1
125 TABLET ORAL DAILY
Qty: 225 TABLET | Refills: 1 | Status: SHIPPED | OUTPATIENT
Start: 2023-01-25

## 2023-01-25 SDOH — ECONOMIC STABILITY: FOOD INSECURITY: WITHIN THE PAST 12 MONTHS, THE FOOD YOU BOUGHT JUST DIDN'T LAST AND YOU DIDN'T HAVE MONEY TO GET MORE.: NEVER TRUE

## 2023-01-25 SDOH — ECONOMIC STABILITY: FOOD INSECURITY: WITHIN THE PAST 12 MONTHS, YOU WORRIED THAT YOUR FOOD WOULD RUN OUT BEFORE YOU GOT MONEY TO BUY MORE.: NEVER TRUE

## 2023-01-25 ASSESSMENT — ENCOUNTER SYMPTOMS
VOMITING: 0
EYE PAIN: 0
STRIDOR: 0
CONSTIPATION: 0
WHEEZING: 0
EYE ITCHING: 0
COUGH: 0
SORE THROAT: 0
PHOTOPHOBIA: 0
EYE DISCHARGE: 0
TROUBLE SWALLOWING: 0
SHORTNESS OF BREATH: 0
ABDOMINAL PAIN: 0
NAUSEA: 0
COLOR CHANGE: 0
RHINORRHEA: 0
DIARRHEA: 0
ANAL BLEEDING: 0
FACIAL SWELLING: 0

## 2023-01-25 ASSESSMENT — PATIENT HEALTH QUESTIONNAIRE - PHQ9
2. FEELING DOWN, DEPRESSED OR HOPELESS: 0
SUM OF ALL RESPONSES TO PHQ QUESTIONS 1-9: 0
1. LITTLE INTEREST OR PLEASURE IN DOING THINGS: 0
SUM OF ALL RESPONSES TO PHQ9 QUESTIONS 1 & 2: 0

## 2023-01-25 ASSESSMENT — SOCIAL DETERMINANTS OF HEALTH (SDOH): HOW HARD IS IT FOR YOU TO PAY FOR THE VERY BASICS LIKE FOOD, HOUSING, MEDICAL CARE, AND HEATING?: NOT HARD AT ALL

## 2023-01-25 NOTE — PROGRESS NOTES
2023    Name: Myron Rausch : 1950 Sex: male  Age: 71 y.o. Subjective:  Chief Complaint   Patient presents with    Hypertension        Hypertension  Pertinent negatives include no chest pain, headaches, palpitations or shortness of breath. Hyperlipidemia  Pertinent negatives include no chest pain, myalgias or shortness of breath. Patient is a history of saddle embolus accompanied by cor pulmonale and pulmonary hypertension. This happened in . This was an unprovoked episode. He was treated by Dr. Rashaun Thakur and followed up with Dr. Tanya Schwartz and Dr. Danny Puentes. He has been on Eliquis 5 mg twice daily since then. He saw Dr. Danny Puentes in the 2021 and I reviewed his reports. He recommended that Eliquis be cut back to 2.5 mg twice a day and continued indefinitely. He also recommended he stay on his beta-blocker and his ACE inhibitor. He saw Dr. Danny Puentes late last year and I reviewed the reports. 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 When he sees him this October he will decide whether to recheck a CT a of his aorta. He has been following up with Dr. Roosevelt Hawkins regarding his ulcerative colitis. He has several bowel movements during the day. GI increased his 6-mercaptopurine to 50 mg, 2-1/2 tablets daily. He continues on his Lialda 1.2 g 3 times daily and folic acid 4183 mcg daily. He is scheduled for a colonoscopy this Wednesday. PT/INR was requested by GI and will be sent to him. He has hypertension as well. Medications were adjusted, he is on lisinopril 30 mg daily and metoprolol 50 mg daily . He has a history of elevated PSAs and sees Dr. Colvin Power Gilberto. Last PSA was a little higher than the previous at 5.35 in 2023 compared to 4.69 in 2022. Copies will be sent to Dr. Td Sifuentes    He saw Dr. Rashaun Thakur his vascular surgeon and reviewed report. Prior to that he had a CTA which showed bilateral pulmonary emboli.   There is a left lower lobe opacity measured measuring 3.6 x 3.4 cm. Follow-up CT recommended in 3 months. He also had a 7 mm nonobstructive left renal calculus. Repeat ultrasound of his lower extremities showed thrombus within the left popliteal and peroneal veins. Echocardiogram repeated on 9/29/2020 showed normal left ventricle size with ejection fraction of 65%. The right ventricle structure and function were normal.  He had mild to moderate aortic regurgitation with right ventricular systolic pressure at 41 mm    Repeat CT without contrast done by his pulmonologist on September 23, 2020 showed resolution of the soft tissue density of the left lower lobe    CTA in March 2022 was lungs revealed aneurysmal dilatation of his ascending aorta 4.2 cm. He saw Dr. Bijan Titus   He does complain of urgency and frequency during the day with some dribbling. Does not have nocturia. Has a little bit of difficulty starting his urinary stream in the morning. Sherryle Moder He saw Dr. Bijan Titus  who checked his PSA and started him on Flomax. Patient says the Flomax is not really helping his symptoms as well as he thought it should. I asked him to check with Dr. Ritchie Perez regarding the different medication. His last PSA January 2022 was 5.07 which is stable. Recheck PSA this July was still slightly elevated at 4.69 but better than it was last year    Patient has a history of anxiety on Zoloft, hypertension, hyperlipidemia. He takes his medications as directed and has no problems. His fasting CMP in January was acceptable except for fasting blood sugar of 108. However his hemoglobin A1c was normal at 5.7%. Lipids show total cholesterol 179, triglycerides 86, LDL cholesterol 111, HDL cholesterol 52. He continues atorvastatin 10 mg daily        He has benign tremors which improve when he drinks alcohol. ..  Ask him how much alcohol he drinks it is reluctant to say but he does drink every day.   He continues to smoke both cigars and cigarettes and refuses to quit.    He complains of some red spots close to his nose.  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  He was encouraged to cut back on his alcohol use    Reviewed immunizations and he is up-to-date on all his immunizations he had his flu shot on October 2022.  In November 2021 he had Covid infection and received monoclonal antibodies.  He had his booster shots.  Is up-to-date on his Shingrix shot and his Pneumovax.  He will probably need a Td booster in the near future    Review of Systems   Constitutional:  Negative for appetite change, fatigue and unexpected weight change.   HENT:  Positive for congestion. Negative for ear pain, facial swelling, rhinorrhea, sore throat, tinnitus and trouble swallowing.    Eyes:  Negative for photophobia, pain, discharge, itching and visual disturbance.   Respiratory:  Negative for cough, shortness of breath, wheezing and stridor.    Cardiovascular:  Negative for chest pain, palpitations and leg swelling.   Gastrointestinal:  Negative for abdominal pain, anal bleeding, constipation, diarrhea, nausea and vomiting.   Endocrine: Negative for cold intolerance, heat intolerance, polydipsia, polyphagia and polyuria.   Genitourinary:  Negative for difficulty urinating, dysuria, flank pain, frequency, hematuria and urgency.   Musculoskeletal:  Negative for arthralgias, gait problem, joint swelling and myalgias.   Skin:  Negative for color change, pallor and rash.   Allergic/Immunologic: Negative for environmental allergies and food allergies.   Neurological:  Positive for tremors. Negative for dizziness, seizures, syncope, speech difficulty, weakness, light-headedness, numbness and headaches.        Tingling in bilateral anterior thighs only when he is standing, not when he is lying sitting or walking   Hematological:  Negative for adenopathy. Does not bruise/bleed easily.  Psychiatric/Behavioral:  Negative for agitation, behavioral problems, confusion, sleep disturbance and suicidal ideas. The patient is not nervous/anxious. Current Outpatient Medications:     apixaban (ELIQUIS) 2.5 MG TABS tablet, Take 1 tablet by mouth 2 times daily, Disp: 180 tablet, Rfl: 1    LIALDA 1.2 g EC tablet, Take 1 tablet by mouth 3 times daily, Disp: 270 tablet, Rfl: 1    atorvastatin (LIPITOR) 10 MG tablet, Take 1 tablet by mouth daily, Disp: 90 tablet, Rfl: 1    folic acid (FOLVITE) 1 MG tablet, Take 1 tablet by mouth daily, Disp: 90 tablet, Rfl: 1    lisinopril (PRINIVIL;ZESTRIL) 30 MG tablet, Take 1 tablet by mouth daily, Disp: 90 tablet, Rfl: 1    metoprolol succinate (TOPROL XL) 50 MG extended release tablet, Take 1 tablet by mouth daily, Disp: 90 tablet, Rfl: 1    sertraline (ZOLOFT) 100 MG tablet, Take 1 tablet by mouth daily, Disp: 90 tablet, Rfl: 1    mercaptopurine (PURINETHOL) 50 MG chemo tablet, Take 2.5 tablets by mouth daily, Disp: 225 tablet, Rfl: 1    tamsulosin (FLOMAX) 0.4 MG capsule, Take 0.8 mg by mouth daily, Disp: , Rfl:      No Known Allergies     Past Medical History:   Diagnosis Date    Anxiety 6/5/2019    COPD (chronic obstructive pulmonary disease) ([facility] [address]     Essential hypertension 12/5/2017    Hx of blood clots 09/2020    PEs    Lung nodule 9/9/2020    Mixed hyperlipidemia 6/5/2019    SOB (shortness of breath)     Ulcerative colitis ([facility] [address] 6/5/2019       There are no preventive care reminders to display for this patient.        Patient Active Problem List   Diagnosis    Ulcerative colitis ([facility] [address]    Anxiety    Mixed hyperlipidemia    Tobacco dependence    Alcohol use    Essential hypertension    Chronic anticoagulation    COPD (chronic obstructive pulmonary disease) (HCC)    Elevated PSA    Hyperglycemia    History of pulmonary embolism    Aortic valve disease    Pulmonary embolism, bilateral (HCC)    Thoracic aortic ectasia (HCC)        Past Surgical History:   Procedure Laterality Date    COLONOSCOPY      DENTAL SURGERY      extraction    PULMONARY EMBOLISM LYSIS  2020    Dr Era Prader        Family History   Problem Relation Age of Onset    COPD Mother         Social History     Tobacco Use    Smoking status: Heavy Smoker     Packs/day: 1.00     Years: 30.00     Pack years: 30.00     Types: Cigars, Cigarettes     Start date: 9/3/1969     Last attempt to quit: 1975     Years since quittin.0    Smokeless tobacco: Never    Tobacco comments:     smoked about 1 ppd cigarettes for about 10-15 yrs, cigars about 5/week for past 20+ yrs   Vaping Use    Vaping Use: Never used   Substance Use Topics    Alcohol use: Yes     Alcohol/week: 8.0 standard drinks     Types: 8 Cans of beer per week     Comment: coiple a day sometimes     Drug use: Never        Objective  Vitals:    23 1029   BP: 120/74   Pulse: 56   Temp: 97.3 °F (36.3 °C)   SpO2: 98%   Weight: 213 lb (96.6 kg)   Height: 5' 9\" (1.753 m)        Exam:  Physical Exam  Vitals reviewed. Constitutional:       General: He is not in acute distress. Appearance: Normal appearance. He is well-developed. He is not ill-appearing. HENT:      Head: Normocephalic. Right Ear: External ear normal.      Left Ear: External ear normal.   Eyes:      General: No scleral icterus. Right eye: No discharge. Left eye: No discharge. Conjunctiva/sclera: Conjunctivae normal.      Pupils: Pupils are equal, round, and reactive to light. Neck:      Thyroid: No thyromegaly. Cardiovascular:      Rate and Rhythm: Normal rate and regular rhythm. Heart sounds: Normal heart sounds. No murmur heard. No friction rub. No gallop. Pulmonary:      Effort: Pulmonary effort is normal. No respiratory distress. Breath sounds: Normal breath sounds. No wheezing or rales. Chest:      Chest wall: No tenderness. Abdominal:      General: Bowel sounds are normal. There is no distension.       Palpations: Abdomen is soft. There is no mass. Tenderness: There is no abdominal tenderness. There is no guarding or rebound. Musculoskeletal:         General: No tenderness or deformity. Normal range of motion. Cervical back: Normal range of motion and neck supple. Comments: In the middle of his left palm is an approximately 0.6 cm nodular lesion which is nontender to palpation. Lymphadenopathy:      Cervical: No cervical adenopathy. Skin:     General: Skin is warm and dry. Coloration: Skin is not pale. Findings: No erythema or rash. Comments: Flat reddish spots on nose and nasolabial areas   Neurological:      General: No focal deficit present. Mental Status: He is alert and oriented to person, place, and time. Cranial Nerves: No cranial nerve deficit. Sensory: No sensory deficit. Deep Tendon Reflexes: Reflexes normal.      Comments: Intention tremor   Psychiatric:         Mood and Affect: Mood normal.         Behavior: Behavior normal.         Thought Content: Thought content normal.         Judgment: Judgment normal.        Last labs reviewed. ASSESSMENT & PLAN :   Problem List          Circulatory    Thoracic aortic ectasia (HCC)       measuring 4.2 cm on a CT scan in 2022. He follows with Dr. Franco Lake and we will leave it up to him to decide if he wants to recheck this in October. Essential hypertension       at goal.  Continue his lisinopril 30 mg a day and metoprolol succinate 50 mg a day. Monitor his blood pressures let me know if they go over 898 systolic or over 86 diastolic  Prescription given for lisinopril 30 mg a day and metoprolol succinate 50 mg a day         Relevant Medications    lisinopril (PRINIVIL;ZESTRIL) 30 MG tablet    metoprolol succinate (TOPROL XL) 50 MG extended release tablet       Respiratory    COPD (chronic obstructive pulmonary disease) ([facility] [address]       patient is not significantly short of breath and does not need a rescue inhaler.   He continues to smoke and will not quit.  We will consider doing pulmonary function test on him the next time he comes in.  Consider continuing his low-dose CT scan for lung cancer screening            Digestive    Ulcerative colitis (HCC)       not at goal.  GI increased his mercaptopurine and scheduled him for colonoscopy this Wednesday.  Await results  Prescription given for 6-MP, Lialda and folic acid         Relevant Medications    LIALDA 1.2 g EC tablet    folic acid (FOLVITE) 1 MG tablet    mercaptopurine (PURINETHOL) 50 MG chemo tablet       Other    Anxiety       doing well on sertraline 100 mg daily, prescription given for sertraline 100 mg         Relevant Medications    sertraline (ZOLOFT) 100 MG tablet    Mixed hyperlipidemia       not at goal.  LDL still 111.  He is tolerating atorvastatin 10 mg a day.  We will ask him to watch his diet more carefully and recheck lipids before he comes back in 6 months  Prescription given for atorvastatin 10 mg daily         Relevant Medications    apixaban (ELIQUIS) 2.5 MG TABS tablet    atorvastatin (LIPITOR) 10 MG tablet    lisinopril (PRINIVIL;ZESTRIL) 30 MG tablet    metoprolol succinate (TOPROL XL) 50 MG extended release tablet    Chronic anticoagulation       no bleeding, continue Eliquis 2.5 mg twice daily prescription given for Eliquis         Elevated PSA       last 2 PSA results to be sent to his urologist         Hyperglycemia       elevated fasting blood sugars but normal hemoglobin A1c.  Watch carbs and sweets in his diet         History of pulmonary embolism - Primary       follow-up with cardiologist and pulmonologist.  Continue Eliquis 2.5 mg twice a day.  His platelet count has decreased slightly and we will continue to monitor that.  Hemoglobin and hematocrit are normal.             Return in about 6 months (around 7/25/2023), or AWV plus HTN HL.       Mary Hogan, DO  1/25/2023  Low Dose CT (LDCT) Lung Screening criteria met:     Age  55-77(Medicare) or 50-80 ([facility])   Pack year smoking >30 (Medicare) or >20 ([facility])   Still smoking or less than 15 year since quit   No sign or symptoms of lung cancer   > 11 months since last LDCT     Risks and benefits of lung cancer screening with LDCT scans discussed:    Significance of positive screen - False-positive LDCT results often occur. 95% of all positive results do not lead to a diagnosis of cancer. Usually further imaging can resolve most false-positive results; however, some patients may require invasive procedures. Over diagnosis risk - 10% to 12% of screen-detected lung cancer cases are over diagnosed--that is, the cancer would not have been detected in the patient's lifetime without the screening. Need for follow up screens annually to continue lung cancer screening effectiveness     Risks associated with radiation from annual LDCT- Radiation exposure is about the same as for a mammogram, which is about 1/3 of the annual background radiation exposure from everyday life. Starting screening at age 54 is not likely to increase cancer risk from radiation exposure. Patients with comorbidities resulting in life expectancy of < 10 years, or that would preclude treatment of an abnormality identified on CT, should not be screened due to lack of benefit.     To obtain maximal benefit from this screening, smoking cessation and long-term abstinence from smoking is critical

## 2023-01-25 NOTE — ASSESSMENT & PLAN NOTE
not at goal.  GI increased his mercaptopurine and scheduled him for colonoscopy this Wednesday.   Await results  Prescription given for 6-MP, Lialda and folic acid

## 2023-01-25 NOTE — ASSESSMENT & PLAN NOTE
at goal.  Continue his lisinopril 30 mg a day and metoprolol succinate 50 mg a day.   Monitor his blood pressures let me know if they go over 049 systolic or over 86 diastolic  Prescription given for lisinopril 30 mg a day and metoprolol succinate 50 mg a day

## 2023-01-25 NOTE — ASSESSMENT & PLAN NOTE
follow-up with cardiologist and pulmonologist.  Continue Eliquis 2.5 mg twice a day. His platelet count has decreased slightly and we will continue to monitor that.   Hemoglobin and hematocrit are normal.

## 2023-01-25 NOTE — ASSESSMENT & PLAN NOTE
not at goal.  LDL still 111. He is tolerating atorvastatin 10 mg a day.   We will ask him to watch his diet more carefully and recheck lipids before he comes back in 6 months  Prescription given for atorvastatin 10 mg daily

## 2023-01-25 NOTE — ASSESSMENT & PLAN NOTE
patient is not significantly short of breath and does not need a rescue inhaler. He continues to smoke and will not quit. We will consider doing pulmonary function test on him the next time he comes in.   Consider continuing his low-dose CT scan for lung cancer screening

## 2023-01-25 NOTE — ASSESSMENT & PLAN NOTE
measuring 4.2 cm on a CT scan in 2022. He follows with Dr. Frederick Ruano and we will leave it up to him to decide if he wants to recheck this in October.

## 2023-02-06 ENCOUNTER — TELEPHONE (OUTPATIENT)
Dept: PRIMARY CARE CLINIC | Age: 73
End: 2023-02-06

## 2023-02-06 NOTE — TELEPHONE ENCOUNTER
Call patient. Let us know if he still wants his low-dose CT lung screen for lung cancer. If he does let me know and I will put the order in.   He can get this scheduled at [facility]

## 2023-02-07 DIAGNOSIS — F17.210 NICOTINE DEPENDENCE, CIGARETTES, UNCOMPLICATED: Primary | ICD-10-CM

## 2023-06-14 ENCOUNTER — TELEPHONE (OUTPATIENT)
Dept: PRIMARY CARE CLINIC | Age: 73
End: 2023-06-14

## 2023-06-14 DIAGNOSIS — Z79.01 CHRONIC ANTICOAGULATION: ICD-10-CM

## 2023-06-14 DIAGNOSIS — K51.80 OTHER ULCERATIVE COLITIS WITHOUT COMPLICATION (HCC): ICD-10-CM

## 2023-06-14 DIAGNOSIS — R73.9 HYPERGLYCEMIA: ICD-10-CM

## 2023-06-14 DIAGNOSIS — I10 ESSENTIAL HYPERTENSION: Primary | ICD-10-CM

## 2023-06-14 DIAGNOSIS — E78.2 MIXED HYPERLIPIDEMIA: ICD-10-CM

## 2023-06-14 DIAGNOSIS — R97.20 ELEVATED PSA: ICD-10-CM

## 2023-06-14 PROBLEM — I26.99 PULMONARY EMBOLISM, BILATERAL (HCC): Status: RESOLVED | Noted: 2022-01-26 | Resolved: 2023-06-14

## 2023-06-14 NOTE — TELEPHONE ENCOUNTER
Patient is requesting order for labs and PSA. He has an appt w Dr Roney Richardson in 2 weeks and appt with you Aug 2nd. He wants labs completed early for both appts. I advised it may be too early to have them done for you, but he still wants them ordered and mailed to him.

## 2023-06-21 LAB
A/G RATIO: 1 RATIO (ref 1.1–2.2)
ALBUMIN SERPL-MCNC: 3.8 G/DL (ref 3.4–4.8)
ALP BLD-CCNC: 39 U/L (ref 42–121)
ALT SERPL-CCNC: 23 U/L (ref 10–63)
ANION GAP SERPL CALCULATED.3IONS-SCNC: 7 MEQ/L (ref 3–11)
AST SERPL-CCNC: 19 U/L (ref 10–41)
BASOPHILS ABSOLUTE: 0 K/UL (ref 0–0.2)
BASOPHILS RELATIVE PERCENT: 0.4 % (ref 0–1.5)
BILIRUB SERPL-MCNC: 1.1 MG/DL (ref 0.3–1.5)
BUN BLDV-MCNC: 19 MG/DL (ref 6–20)
CALCIUM SERPL-MCNC: 9.2 MG/DL (ref 8.5–10.5)
CHLORIDE BLD-SCNC: 110 MEQ/L (ref 98–107)
CHOLESTEROL: 190 MG/DL (ref 140–200)
CO2: 22 MEQ/L (ref 21–31)
CREAT SERPL-MCNC: 1 MG/DL (ref 0.6–1.3)
CREATININE + EGFR PANEL: 89 ML/MIN
DIAGNOSTIC PSA: 4.55 NG/ML (ref 0–4)
EOSINOPHILS ABSOLUTE: 0.1 K/UL (ref 0–0.33)
EOSINOPHILS RELATIVE PERCENT: 2 % (ref 0–3)
GFR NON-AFRICAN AMERICAN: 74 ML/MIN
GLOBULIN: 3.7 G/DL (ref 1.9–3.9)
GLUCOSE BLD-MCNC: 104 MG/DL (ref 70–99)
HCT VFR BLD CALC: 41.7 % (ref 41–50)
HDLC SERPL-MCNC: 50 MG/DL (ref 29–71)
HEMOGLOBIN: 14.3 G/DL (ref 13.5–16.5)
LDL CHOLESTEROL: 125 MG/DL
LDL/HDL RATIO: 2.5 RATIO
LYMPHOCYTES ABSOLUTE: 1 K/UL (ref 1.1–4.8)
LYMPHOCYTES RELATIVE PERCENT: 26 % (ref 24–44)
MCH RBC QN AUTO: 34.4 PG (ref 28–34)
MCHC RBC AUTO-ENTMCNC: 34.4 G/DL (ref 33–37)
MCV RBC AUTO: 100 FL (ref 80–100)
MONOCYTES ABSOLUTE: 0.3 K/UL (ref 0.2–0.7)
MONOCYTES RELATIVE PERCENT: 7.9 % (ref 3.4–9)
NEUTROPHILS ABSOLUTE: 2.4 K/UL (ref 1.83–8.7)
PDW BLD-RTO: 14.5 % (ref 10.9–14.3)
PLATELET # BLD: 197 K/UL (ref 150–450)
PMV BLD AUTO: 8.6 FL (ref 7.4–10.4)
POTASSIUM SERPL-SCNC: 3.9 MEQ/L (ref 3.6–5)
RBC # BLD: 4.17 M/UL (ref 4.5–5.5)
SEGMENTED NEUTROPHILS RELATIVE PERCENT: 63.7 % (ref 40–74)
SODIUM BLD-SCNC: 139 MEQ/L (ref 135–145)
TOTAL PROTEIN: 7.5 G/DL (ref 5.9–7.8)
TRIGL SERPL-MCNC: 134 MG/DL (ref 41–189)

## 2023-08-02 ENCOUNTER — OFFICE VISIT (OUTPATIENT)
Dept: PRIMARY CARE CLINIC | Age: 73
End: 2023-08-02
Payer: MEDICARE

## 2023-08-02 VITALS
HEIGHT: 69 IN | HEART RATE: 59 BPM | SYSTOLIC BLOOD PRESSURE: 136 MMHG | DIASTOLIC BLOOD PRESSURE: 82 MMHG | OXYGEN SATURATION: 94 % | WEIGHT: 216 LBS | BODY MASS INDEX: 31.99 KG/M2 | TEMPERATURE: 97.9 F

## 2023-08-02 DIAGNOSIS — K51.80 OTHER ULCERATIVE COLITIS WITHOUT COMPLICATION (HCC): ICD-10-CM

## 2023-08-02 DIAGNOSIS — Z79.01 CHRONIC ANTICOAGULATION: ICD-10-CM

## 2023-08-02 DIAGNOSIS — Z86.711 HISTORY OF PULMONARY EMBOLISM: ICD-10-CM

## 2023-08-02 DIAGNOSIS — J44.9 CHRONIC OBSTRUCTIVE PULMONARY DISEASE, UNSPECIFIED COPD TYPE (HCC): ICD-10-CM

## 2023-08-02 DIAGNOSIS — Z00.00 MEDICARE ANNUAL WELLNESS VISIT, SUBSEQUENT: Primary | ICD-10-CM

## 2023-08-02 DIAGNOSIS — E78.2 MIXED HYPERLIPIDEMIA: ICD-10-CM

## 2023-08-02 DIAGNOSIS — I10 ESSENTIAL HYPERTENSION: Primary | ICD-10-CM

## 2023-08-02 DIAGNOSIS — I77.810 THORACIC AORTIC ECTASIA (HCC): ICD-10-CM

## 2023-08-02 DIAGNOSIS — F41.9 ANXIETY: ICD-10-CM

## 2023-08-02 PROCEDURE — 99214 OFFICE O/P EST MOD 30 MIN: CPT | Performed by: INTERNAL MEDICINE

## 2023-08-02 PROCEDURE — G8427 DOCREV CUR MEDS BY ELIG CLIN: HCPCS | Performed by: INTERNAL MEDICINE

## 2023-08-02 PROCEDURE — 3023F SPIROM DOC REV: CPT | Performed by: INTERNAL MEDICINE

## 2023-08-02 PROCEDURE — 1123F ACP DISCUSS/DSCN MKR DOCD: CPT | Performed by: INTERNAL MEDICINE

## 2023-08-02 PROCEDURE — 3079F DIAST BP 80-89 MM HG: CPT | Performed by: INTERNAL MEDICINE

## 2023-08-02 PROCEDURE — 3017F COLORECTAL CA SCREEN DOC REV: CPT | Performed by: INTERNAL MEDICINE

## 2023-08-02 PROCEDURE — 3075F SYST BP GE 130 - 139MM HG: CPT | Performed by: INTERNAL MEDICINE

## 2023-08-02 PROCEDURE — G8417 CALC BMI ABV UP PARAM F/U: HCPCS | Performed by: INTERNAL MEDICINE

## 2023-08-02 PROCEDURE — 4004F PT TOBACCO SCREEN RCVD TLK: CPT | Performed by: INTERNAL MEDICINE

## 2023-08-02 PROCEDURE — G0439 PPPS, SUBSEQ VISIT: HCPCS | Performed by: INTERNAL MEDICINE

## 2023-08-02 RX ORDER — ATORVASTATIN CALCIUM 10 MG/1
10 TABLET, FILM COATED ORAL DAILY
Qty: 90 TABLET | Refills: 1 | Status: SHIPPED | OUTPATIENT
Start: 2023-08-02

## 2023-08-02 RX ORDER — METOPROLOL SUCCINATE 50 MG/1
50 TABLET, EXTENDED RELEASE ORAL DAILY
Qty: 90 TABLET | Refills: 1 | Status: SHIPPED | OUTPATIENT
Start: 2023-08-02

## 2023-08-02 RX ORDER — SERTRALINE HYDROCHLORIDE 100 MG/1
100 TABLET, FILM COATED ORAL DAILY
Qty: 90 TABLET | Refills: 1 | Status: SHIPPED | OUTPATIENT
Start: 2023-08-02

## 2023-08-02 RX ORDER — MESALAMINE 1.2 G/1
1.2 TABLET, DELAYED RELEASE ORAL 3 TIMES DAILY
Qty: 270 TABLET | Refills: 1 | Status: SHIPPED | OUTPATIENT
Start: 2023-08-02

## 2023-08-02 RX ORDER — FOLIC ACID 1 MG/1
1000 TABLET ORAL DAILY
Qty: 90 TABLET | Refills: 1 | Status: SHIPPED | OUTPATIENT
Start: 2023-08-02

## 2023-08-02 RX ORDER — DICYCLOMINE HCL 20 MG
TABLET ORAL
COMMUNITY
Start: 2023-04-26

## 2023-08-02 RX ORDER — MERCAPTOPURINE 50 MG/1
125 TABLET ORAL DAILY
Qty: 225 TABLET | Refills: 1 | Status: SHIPPED | OUTPATIENT
Start: 2023-08-02

## 2023-08-02 RX ORDER — LISINOPRIL 30 MG/1
30 TABLET ORAL DAILY
Qty: 90 TABLET | Refills: 1 | Status: SHIPPED | OUTPATIENT
Start: 2023-08-02

## 2023-08-02 SDOH — ECONOMIC STABILITY: INCOME INSECURITY: HOW HARD IS IT FOR YOU TO PAY FOR THE VERY BASICS LIKE FOOD, HOUSING, MEDICAL CARE, AND HEATING?: NOT HARD AT ALL

## 2023-08-02 SDOH — ECONOMIC STABILITY: FOOD INSECURITY: WITHIN THE PAST 12 MONTHS, THE FOOD YOU BOUGHT JUST DIDN'T LAST AND YOU DIDN'T HAVE MONEY TO GET MORE.: NEVER TRUE

## 2023-08-02 SDOH — ECONOMIC STABILITY: FOOD INSECURITY: WITHIN THE PAST 12 MONTHS, YOU WORRIED THAT YOUR FOOD WOULD RUN OUT BEFORE YOU GOT MONEY TO BUY MORE.: NEVER TRUE

## 2023-08-02 SDOH — ECONOMIC STABILITY: HOUSING INSECURITY
IN THE LAST 12 MONTHS, WAS THERE A TIME WHEN YOU DID NOT HAVE A STEADY PLACE TO SLEEP OR SLEPT IN A SHELTER (INCLUDING NOW)?: NO

## 2023-08-02 ASSESSMENT — ENCOUNTER SYMPTOMS
EYE ITCHING: 0
ABDOMINAL PAIN: 0
TROUBLE SWALLOWING: 0
ANAL BLEEDING: 0
EYE DISCHARGE: 0
COUGH: 0
SORE THROAT: 0
WHEEZING: 0
FACIAL SWELLING: 0
VOMITING: 0
CONSTIPATION: 0
RHINORRHEA: 0
COLOR CHANGE: 0
STRIDOR: 0
EYE PAIN: 0
DIARRHEA: 0
PHOTOPHOBIA: 0
SHORTNESS OF BREATH: 0
NAUSEA: 0

## 2023-08-02 ASSESSMENT — LIFESTYLE VARIABLES
HOW OFTEN DURING THE LAST YEAR HAVE YOU BEEN UNABLE TO REMEMBER WHAT HAPPENED THE NIGHT BEFORE BECAUSE YOU HAD BEEN DRINKING: 0
HOW OFTEN DURING THE LAST YEAR HAVE YOU NEEDED AN ALCOHOLIC DRINK FIRST THING IN THE MORNING TO GET YOURSELF GOING AFTER A NIGHT OF HEAVY DRINKING: 0
HOW OFTEN DO YOU HAVE A DRINK CONTAINING ALCOHOL: 4 OR MORE TIMES A WEEK
HAVE YOU OR SOMEONE ELSE BEEN INJURED AS A RESULT OF YOUR DRINKING: 0
HOW OFTEN DURING THE LAST YEAR HAVE YOU FOUND THAT YOU WERE NOT ABLE TO STOP DRINKING ONCE YOU HAD STARTED: 0
HOW MANY STANDARD DRINKS CONTAINING ALCOHOL DO YOU HAVE ON A TYPICAL DAY: 1 OR 2
HAS A RELATIVE, FRIEND, DOCTOR, OR ANOTHER HEALTH PROFESSIONAL EXPRESSED CONCERN ABOUT YOUR DRINKING OR SUGGESTED YOU CUT DOWN: 0
HOW OFTEN DURING THE LAST YEAR HAVE YOU FAILED TO DO WHAT WAS NORMALLY EXPECTED FROM YOU BECAUSE OF DRINKING: 0
HOW OFTEN DURING THE LAST YEAR HAVE YOU HAD A FEELING OF GUILT OR REMORSE AFTER DRINKING: 0

## 2023-08-02 NOTE — PROGRESS NOTES
Abdominal:      General: Bowel sounds are normal. There is no distension. Palpations: Abdomen is soft. There is no mass. Tenderness: There is no abdominal tenderness. There is no guarding or rebound. Musculoskeletal:         General: No tenderness or deformity. Normal range of motion. Cervical back: Normal range of motion and neck supple. Comments: In the middle of his left palm is an approximately 0.6 cm nodular lesion which is nontender to palpation. Lymphadenopathy:      Cervical: No cervical adenopathy. Skin:     General: Skin is warm and dry. Coloration: Skin is not pale. Findings: No erythema or rash. Comments: Flat reddish spots on nose and nasolabial areas   Neurological:      General: No focal deficit present. Mental Status: He is alert and oriented to person, place, and time. Cranial Nerves: No cranial nerve deficit. Sensory: No sensory deficit. Deep Tendon Reflexes: Reflexes normal.      Comments: Intention tremor   Psychiatric:         Mood and Affect: Mood normal.         Behavior: Behavior normal.         Thought Content: Thought content normal.         Judgment: Judgment normal.        Last labs reviewed. Prescriptions given for apixaban, Lialda, lisinopril, mercaptopurine, metoprolol succinate, sertraline, atorvastatin and folic acid    ASSESSMENT & PLAN :   Problem List          Circulatory    Thoracic aortic ectasia (HCC)       stable. Last measurement 4.2 cm. We will have repeat CTA in 1 year           Essential hypertension - Primary       at goal.  Continue lisinopril 30 mg a day.   Monitor blood pressures and recheck fasting CMP before his next office visit           Relevant Medications    lisinopril (PRINIVIL;ZESTRIL) 30 MG tablet    metoprolol succinate (TOPROL XL) 50 MG extended release tablet    Other Relevant Orders    Comprehensive Metabolic Panel       Respiratory    COPD (chronic obstructive pulmonary disease) ([address])

## 2023-08-02 NOTE — ASSESSMENT & PLAN NOTE
at goal.  Continue lisinopril 30 mg a day.   Monitor blood pressures and recheck fasting CMP before his next office visit

## 2023-08-02 NOTE — PROGRESS NOTES
Medicare Annual Wellness Visit    Ty Hernandez is here for Medicare AWV    Assessment & Plan     Recommendations for Preventive Services Due: see orders and patient instructions/AVS.  Recommended screening schedule for the next 5-10 years is provided to the patient in written form: see Patient Instructions/AVS.     No follow-ups on file. Subjective       Patient's complete Health Risk Assessment and screening values have been reviewed and are found in Flowsheets. Positive Risk Factor Screenings with Interventions:                 Weight and Activity:  Physical Activity: Sufficiently Active    Days of Exercise per Week: 5 days    Minutes of Exercise per Session: 30 min     On average, how many days per week do you engage in moderate to strenuous exercise (like a brisk walk)?: 5 days  Have you lost any weight without trying in the past 3 months?: No  There is no height or weight on file to calculate BMI. (!) Abnormal    Obesity Interventions:  Patient comments: will watch portions, increase exercise and decrease  intake of calorie dense foods           Hearing Screen:  Do you or your family notice any trouble with your hearing that hasn't been managed with hearing aids?: (!) Yes    Interventions: Will wear hearing aids regularly     Safety:  Do you have either shower bars, grab bars, non-slip mats or non-slip surfaces in your shower or bathtub?: (!) No    Interventions:  See AVS for additional education material      Tobacco Use:  Tobacco Use: High Risk    Smoking Tobacco Use: Heavy Smoker    Smokeless Tobacco Use: Never    Passive Exposure: Not on file     E-cigarette/Vaping       Questions Responses    E-cigarette/Vaping Use Never User    Start Date     Passive Exposure     Quit Date     Counseling Given     Comments           Interventions:  Patient declined any further intervention or treatment    Tobacco Use Counseling: Patient was counseled on tobacco cessation.  Based upon patient's motivation to change

## 2023-09-26 ENCOUNTER — OFFICE VISIT (OUTPATIENT)
Dept: FAMILY MEDICINE CLINIC | Age: 73
End: 2023-09-26
Payer: MEDICARE

## 2023-09-26 VITALS
DIASTOLIC BLOOD PRESSURE: 78 MMHG | HEART RATE: 52 BPM | RESPIRATION RATE: 16 BRPM | HEIGHT: 69 IN | OXYGEN SATURATION: 98 % | TEMPERATURE: 96.9 F | SYSTOLIC BLOOD PRESSURE: 128 MMHG | WEIGHT: 216 LBS | BODY MASS INDEX: 31.99 KG/M2

## 2023-09-26 DIAGNOSIS — R03.0 ELEVATED BLOOD PRESSURE READING: Primary | ICD-10-CM

## 2023-09-26 PROCEDURE — 3074F SYST BP LT 130 MM HG: CPT

## 2023-09-26 PROCEDURE — 4004F PT TOBACCO SCREEN RCVD TLK: CPT

## 2023-09-26 PROCEDURE — G8417 CALC BMI ABV UP PARAM F/U: HCPCS

## 2023-09-26 PROCEDURE — 3078F DIAST BP <80 MM HG: CPT

## 2023-09-26 PROCEDURE — 99214 OFFICE O/P EST MOD 30 MIN: CPT

## 2023-09-26 PROCEDURE — 3017F COLORECTAL CA SCREEN DOC REV: CPT

## 2023-09-26 PROCEDURE — 1123F ACP DISCUSS/DSCN MKR DOCD: CPT

## 2023-09-26 PROCEDURE — G8427 DOCREV CUR MEDS BY ELIG CLIN: HCPCS

## 2023-09-29 ENCOUNTER — NURSE ONLY (OUTPATIENT)
Dept: PRIMARY CARE CLINIC | Age: 73
End: 2023-09-29

## 2023-09-29 ENCOUNTER — TELEPHONE (OUTPATIENT)
Dept: PRIMARY CARE CLINIC | Age: 73
End: 2023-09-29

## 2023-09-29 VITALS — SYSTOLIC BLOOD PRESSURE: 136 MMHG | DIASTOLIC BLOOD PRESSURE: 80 MMHG

## 2023-09-29 NOTE — TELEPHONE ENCOUNTER
Patient came in for bp check on nurse schedule. Bp in the office today is 136/80. He said he is no longer checking it at home and got rid of his monitor.

## 2023-10-10 ENCOUNTER — OFFICE VISIT (OUTPATIENT)
Dept: CARDIOLOGY CLINIC | Age: 73
End: 2023-10-10
Payer: MEDICARE

## 2023-10-10 VITALS
BODY MASS INDEX: 31.99 KG/M2 | WEIGHT: 216 LBS | HEIGHT: 69 IN | DIASTOLIC BLOOD PRESSURE: 78 MMHG | SYSTOLIC BLOOD PRESSURE: 140 MMHG | RESPIRATION RATE: 12 BRPM | HEART RATE: 55 BPM

## 2023-10-10 DIAGNOSIS — I10 ESSENTIAL HYPERTENSION: ICD-10-CM

## 2023-10-10 DIAGNOSIS — I77.810 THORACIC AORTIC ECTASIA (HCC): ICD-10-CM

## 2023-10-10 DIAGNOSIS — I35.9 AORTIC VALVE DISEASE: Primary | ICD-10-CM

## 2023-10-10 PROBLEM — Z79.01 CHRONIC ANTICOAGULATION: Status: RESOLVED | Noted: 2020-12-21 | Resolved: 2023-10-10

## 2023-10-10 PROCEDURE — 4004F PT TOBACCO SCREEN RCVD TLK: CPT | Performed by: INTERNAL MEDICINE

## 2023-10-10 PROCEDURE — G8417 CALC BMI ABV UP PARAM F/U: HCPCS | Performed by: INTERNAL MEDICINE

## 2023-10-10 PROCEDURE — 1123F ACP DISCUSS/DSCN MKR DOCD: CPT | Performed by: INTERNAL MEDICINE

## 2023-10-10 PROCEDURE — 3017F COLORECTAL CA SCREEN DOC REV: CPT | Performed by: INTERNAL MEDICINE

## 2023-10-10 PROCEDURE — 99214 OFFICE O/P EST MOD 30 MIN: CPT | Performed by: INTERNAL MEDICINE

## 2023-10-10 PROCEDURE — G8484 FLU IMMUNIZE NO ADMIN: HCPCS | Performed by: INTERNAL MEDICINE

## 2023-10-10 PROCEDURE — G8427 DOCREV CUR MEDS BY ELIG CLIN: HCPCS | Performed by: INTERNAL MEDICINE

## 2023-10-10 PROCEDURE — 93000 ELECTROCARDIOGRAM COMPLETE: CPT | Performed by: INTERNAL MEDICINE

## 2023-10-10 PROCEDURE — 3077F SYST BP >= 140 MM HG: CPT | Performed by: INTERNAL MEDICINE

## 2023-10-10 PROCEDURE — 3078F DIAST BP <80 MM HG: CPT | Performed by: INTERNAL MEDICINE

## 2023-10-10 NOTE — PROGRESS NOTES
Chief Complaint   Patient presents with    Valvular Heart Disease       Patient Active Problem List    Diagnosis Date Noted    Thoracic aortic ectasia ([address]) 07/26/2022     Overview Note:     A. CT scan 2022: 4.2 cm  B. CT 2023: same      Aortic valve disease 09/29/2021     Overview Note:     A. Echo 2020:  Mild to moderate AI, trileaflet valve, normal LV size and EF       Hyperglycemia 07/21/2021    History of pulmonary embolism 07/21/2021    COPD (chronic obstructive pulmonary disease) (HCC)     Elevated PSA     Tobacco dependence 09/02/2020    Alcohol use 09/02/2020    Ulcerative colitis ([address]) 06/05/2019    Anxiety 06/05/2019    Mixed hyperlipidemia 06/05/2019    Essential hypertension 12/05/2017       Current Outpatient Medications   Medication Sig Dispense Refill    dicyclomine (BENTYL) 20 MG tablet Take 0.5 tablets by mouth in the morning and at bedtime      apixaban (ELIQUIS) 2.5 MG TABS tablet Take 1 tablet by mouth 2 times daily 180 tablet 1    atorvastatin (LIPITOR) 10 MG tablet Take 1 tablet by mouth daily 90 tablet 1    folic acid (FOLVITE) 1 MG tablet Take 1 tablet by mouth daily 90 tablet 1    LIALDA 1.2 g EC tablet Take 1 tablet by mouth 3 times daily 270 tablet 1    lisinopril (PRINIVIL;ZESTRIL) 30 MG tablet Take 1 tablet by mouth daily 90 tablet 1    metoprolol succinate (TOPROL XL) 50 MG extended release tablet Take 1 tablet by mouth daily 90 tablet 1    sertraline (ZOLOFT) 100 MG tablet Take 1 tablet by mouth daily 90 tablet 1    mercaptopurine (PURINETHOL) 50 MG chemo tablet Take 2.5 tablets by mouth daily 225 tablet 1    tamsulosin (FLOMAX) 0.4 MG capsule Take 2 capsules by mouth daily       Current Facility-Administered Medications   Medication Dose Route Frequency Provider Last Rate Last Admin    perflutren lipid microspheres (DEFINITY) injection 1.5 mL  1.5 mL IntraVENous ONCE PRN Marguerite Titus MD            No Known Allergies    Vitals:    10/10/23 0749   BP: (!) 140/78   Pulse: 55   Resp:

## 2023-11-29 ENCOUNTER — TELEPHONE (OUTPATIENT)
Dept: FAMILY MEDICINE CLINIC | Age: 73
End: 2023-11-29

## 2023-12-06 ENCOUNTER — HOSPITAL ENCOUNTER (OUTPATIENT)
Dept: CARDIOLOGY | Age: 73
Discharge: HOME | End: 2023-12-08
Attending: INTERNAL MEDICINE
Payer: MEDICARE

## 2023-12-06 ENCOUNTER — TELEPHONE (OUTPATIENT)
Dept: CARDIOLOGY CLINIC | Age: 73
End: 2023-12-06

## 2023-12-06 VITALS
SYSTOLIC BLOOD PRESSURE: 120 MMHG | BODY MASS INDEX: 30.96 KG/M2 | WEIGHT: 209 LBS | DIASTOLIC BLOOD PRESSURE: 80 MMHG | HEIGHT: 69 IN

## 2023-12-06 DIAGNOSIS — I35.9 AORTIC VALVE DISEASE: ICD-10-CM

## 2023-12-06 DIAGNOSIS — I77.810 THORACIC AORTIC ECTASIA (HCC): ICD-10-CM

## 2023-12-06 LAB
ECHO AO ASC DIAM: 4.2 CM
ECHO AO ASCENDING AORTA INDEX: 2 CM/M2
ECHO AR MAX VEL PISA: 4.8 M/S
ECHO AV AREA PEAK VELOCITY: 2.4 CM2
ECHO AV AREA VTI: 2.3 CM2
ECHO AV AREA/BSA PEAK VELOCITY: 1.1 CM2/M2
ECHO AV AREA/BSA VTI: 1.1 CM2/M2
ECHO AV CUSP MM: 2 CM
ECHO AV MEAN GRADIENT: 5 MMHG
ECHO AV MEAN VELOCITY: 1.1 M/S
ECHO AV PEAK GRADIENT: 10 MMHG
ECHO AV PEAK VELOCITY: 1.6 M/S
ECHO AV REGURGITANT PHT: 576.7 MILLISECOND
ECHO AV VELOCITY RATIO: 0.75
ECHO AV VTI: 35.9 CM
ECHO BSA: 2.15 M2
ECHO EST RA PRESSURE: 3 MMHG
ECHO LA DIAMETER INDEX: 1.86 CM/M2
ECHO LA DIAMETER: 3.9 CM
ECHO LA VOL A-L A2C: 65 ML (ref 18–58)
ECHO LA VOL A-L A4C: 71 ML (ref 18–58)
ECHO LA VOL MOD A2C: 63 ML (ref 18–58)
ECHO LA VOL MOD A4C: 64 ML (ref 18–58)
ECHO LA VOLUME AREA LENGTH: 68 ML
ECHO LA VOLUME INDEX A-L A2C: 31 ML/M2 (ref 16–34)
ECHO LA VOLUME INDEX A-L A4C: 34 ML/M2 (ref 16–34)
ECHO LA VOLUME INDEX AREA LENGTH: 32 ML/M2 (ref 16–34)
ECHO LA VOLUME INDEX MOD A2C: 30 ML/M2 (ref 16–34)
ECHO LA VOLUME INDEX MOD A4C: 30 ML/M2 (ref 16–34)
ECHO LV FRACTIONAL SHORTENING: 38 % (ref 28–44)
ECHO LV INTERNAL DIMENSION DIASTOLE INDEX: 2.52 CM/M2
ECHO LV INTERNAL DIMENSION DIASTOLIC: 5.3 CM (ref 4.2–5.9)
ECHO LV INTERNAL DIMENSION SYSTOLIC INDEX: 1.57 CM/M2
ECHO LV INTERNAL DIMENSION SYSTOLIC: 3.3 CM
ECHO LV ISOVOLUMETRIC RELAXATION TIME (IVRT): 92.3 MS
ECHO LV IVSD: 0.5 CM (ref 0.6–1)
ECHO LV IVSS: 1.2 CM
ECHO LV MASS 2D: 84.8 G (ref 88–224)
ECHO LV MASS INDEX 2D: 40.4 G/M2 (ref 49–115)
ECHO LV POSTERIOR WALL DIASTOLIC: 0.5 CM (ref 0.6–1)
ECHO LV POSTERIOR WALL SYSTOLIC: 1.2 CM
ECHO LV RELATIVE WALL THICKNESS RATIO: 0.19
ECHO LVOT AREA: 3.1 CM2
ECHO LVOT AV VTI INDEX: 0.71
ECHO LVOT DIAM: 2 CM
ECHO LVOT MEAN GRADIENT: 3 MMHG
ECHO LVOT PEAK GRADIENT: 6 MMHG
ECHO LVOT PEAK VELOCITY: 1.2 M/S
ECHO LVOT STROKE VOLUME INDEX: 38.3 ML/M2
ECHO LVOT SV: 80.4 ML
ECHO LVOT VTI: 25.6 CM
ECHO MV "A" WAVE DURATION: 94.6 MSEC
ECHO MV A VELOCITY: 0.63 M/S
ECHO MV AREA PHT: 3.2 CM2
ECHO MV AREA VTI: 2.2 CM2
ECHO MV E DECELERATION TIME (DT): 203.7 MS
ECHO MV E VELOCITY: 0.74 M/S
ECHO MV E/A RATIO: 1.17
ECHO MV EROA PISA: 0.1 CM2
ECHO MV LVOT VTI INDEX: 1.44
ECHO MV MAX VELOCITY: 1.1 M/S
ECHO MV MEAN GRADIENT: 1 MMHG
ECHO MV MEAN VELOCITY: 0.5 M/S
ECHO MV PEAK GRADIENT: 5 MMHG
ECHO MV PRESSURE HALF TIME (PHT): 68.4 MS
ECHO MV REGURGITANT ALIASING (NYQUIST) VELOCITY: 31 CM/S
ECHO MV REGURGITANT RADIUS PISA: 0.46 CM
ECHO MV REGURGITANT VELOCITY PISA: 6.2 M/S
ECHO MV REGURGITANT VOLUME PISA: 16.72 ML
ECHO MV REGURGITANT VTIA: 251.6 CM
ECHO MV VTI: 36.9 CM
ECHO PULMONARY ARTERY END DIASTOLIC PRESSURE: 3 MMHG
ECHO PV MAX VELOCITY: 0.7 M/S
ECHO PV MEAN GRADIENT: 1 MMHG
ECHO PV MEAN VELOCITY: 0.5 M/S
ECHO PV PEAK GRADIENT: 2 MMHG
ECHO PV REGURGITANT MAX VELOCITY: 0.8 M/S
ECHO PV VTI: 14.7 CM
ECHO PVEIN A DURATION: 117.7 MS
ECHO PVEIN A VELOCITY: 0.2 M/S
ECHO PVEIN PEAK D VELOCITY: 0.4 M/S
ECHO PVEIN PEAK S VELOCITY: 0.4 M/S
ECHO PVEIN S/D RATIO: 1
ECHO RIGHT VENTRICULAR SYSTOLIC PRESSURE (RVSP): 32 MMHG
ECHO TV REGURGITANT MAX VELOCITY: 2.69 M/S
ECHO TV REGURGITANT PEAK GRADIENT: 29 MMHG

## 2023-12-06 PROCEDURE — 93306 TTE W/DOPPLER COMPLETE: CPT | Performed by: INTERNAL MEDICINE

## 2023-12-06 PROCEDURE — 93306 TTE W/DOPPLER COMPLETE: CPT

## 2023-12-06 NOTE — TELEPHONE ENCOUNTER
----- Message from Rangel Ojeda MD sent at 12/6/2023  8:17 AM EST -----  Echo no change  Strong heart, mild leak of aortic vavle, aorta no change in size  Ov one year      ----- Message -----  From: Rangel Ojeda MD  Sent: 12/6/2023   8:16 AM EST  To: Rangel Ojeda MD

## 2023-12-11 ENCOUNTER — TELEPHONE (OUTPATIENT)
Dept: PRIMARY CARE CLINIC | Age: 73
End: 2023-12-11

## 2023-12-11 DIAGNOSIS — K51.80 OTHER ULCERATIVE COLITIS WITHOUT COMPLICATION (HCC): ICD-10-CM

## 2023-12-11 RX ORDER — MESALAMINE 1.2 G/1
1.2 TABLET, DELAYED RELEASE ORAL 4 TIMES DAILY
Qty: 360 TABLET | Refills: 1 | Status: SHIPPED | OUTPATIENT
Start: 2023-12-11

## 2023-12-11 NOTE — TELEPHONE ENCOUNTER
Patient states that Dr Paul Alert upped his Lialda to 4 times daily and he has been taking it that way for the past year. The form you filled out was only for 3 x daily. He needs a new prescription faxed to [phone].

## 2023-12-30 NOTE — ASSESSMENT & PLAN NOTE
no bleeding, continue Eliquis 2.5 mg twice daily prescription given for Eliquis Normal rate, regular rhythm.  Heart sounds S1, S2.

## 2024-01-22 ENCOUNTER — TELEPHONE (OUTPATIENT)
Dept: FAMILY MEDICINE CLINIC | Age: 74
End: 2024-01-22

## 2024-01-22 DIAGNOSIS — Z86.711 HISTORY OF PULMONARY EMBOLISM: ICD-10-CM

## 2024-01-22 DIAGNOSIS — Z79.01 CHRONIC ANTICOAGULATION: ICD-10-CM

## 2024-01-22 NOTE — TELEPHONE ENCOUNTER
Kody asking for new script for eliquis sent to [facility].    Dr Hogan is no longer avalable, and his appointment with you is not until Friday.      Last Appointment:  8/2/2023  Future Appointments   Date Time Provider Department Center   1/26/2024  1:20 PM Serg Acosta MD [facility]   10/15/2024  7:30 AM Keven Reddy MD [place] [facility]

## 2024-01-22 NOTE — TELEPHONE ENCOUNTER
Sure, sent, please let him know.      Normally I wouldn't send without first being seen, but I know this patient and he does intend to establish.

## 2024-01-26 ENCOUNTER — TELEPHONE (OUTPATIENT)
Dept: FAMILY MEDICINE CLINIC | Age: 74
End: 2024-01-26

## 2024-01-26 ENCOUNTER — OFFICE VISIT (OUTPATIENT)
Dept: FAMILY MEDICINE CLINIC | Age: 74
End: 2024-01-26

## 2024-01-26 VITALS
OXYGEN SATURATION: 95 % | HEART RATE: 60 BPM | HEIGHT: 69 IN | TEMPERATURE: 98.1 F | SYSTOLIC BLOOD PRESSURE: 120 MMHG | DIASTOLIC BLOOD PRESSURE: 74 MMHG | WEIGHT: 218 LBS | BODY MASS INDEX: 32.29 KG/M2

## 2024-01-26 DIAGNOSIS — Z12.5 SCREENING FOR MALIGNANT NEOPLASM OF PROSTATE: ICD-10-CM

## 2024-01-26 DIAGNOSIS — K51.80 OTHER ULCERATIVE COLITIS WITHOUT COMPLICATION (HCC): ICD-10-CM

## 2024-01-26 DIAGNOSIS — I10 ESSENTIAL HYPERTENSION: ICD-10-CM

## 2024-01-26 DIAGNOSIS — F41.9 ANXIETY: ICD-10-CM

## 2024-01-26 DIAGNOSIS — I77.810 THORACIC AORTIC ECTASIA (HCC): ICD-10-CM

## 2024-01-26 DIAGNOSIS — E78.2 MIXED HYPERLIPIDEMIA: ICD-10-CM

## 2024-01-26 DIAGNOSIS — I10 ESSENTIAL HYPERTENSION: Primary | ICD-10-CM

## 2024-01-26 DIAGNOSIS — Z86.711 HISTORY OF PULMONARY EMBOLISM: ICD-10-CM

## 2024-01-26 DIAGNOSIS — Z79.01 CHRONIC ANTICOAGULATION: ICD-10-CM

## 2024-01-26 DIAGNOSIS — G25.0 ESSENTIAL TREMOR: ICD-10-CM

## 2024-01-26 DIAGNOSIS — J44.9 CHRONIC OBSTRUCTIVE PULMONARY DISEASE, UNSPECIFIED COPD TYPE (HCC): ICD-10-CM

## 2024-01-26 DIAGNOSIS — N39.43 BENIGN PROSTATIC HYPERPLASIA WITH POST-VOID DRIBBLING: ICD-10-CM

## 2024-01-26 DIAGNOSIS — N40.1 BENIGN PROSTATIC HYPERPLASIA WITH POST-VOID DRIBBLING: ICD-10-CM

## 2024-01-26 LAB
ABSOLUTE IMMATURE GRANULOCYTE: <0.03 K/UL (ref 0–0.58)
ALBUMIN SERPL-MCNC: 4.3 G/DL (ref 3.5–5.2)
ALP BLD-CCNC: 52 U/L (ref 40–129)
ALT SERPL-CCNC: 35 U/L (ref 0–40)
ANION GAP SERPL CALCULATED.3IONS-SCNC: 14 MMOL/L (ref 7–16)
AST SERPL-CCNC: 26 U/L (ref 0–39)
BASOPHILS ABSOLUTE: 0.01 K/UL (ref 0–0.2)
BASOPHILS RELATIVE PERCENT: 0 % (ref 0–2)
BILIRUB SERPL-MCNC: 0.6 MG/DL (ref 0–1.2)
BUN BLDV-MCNC: 16 MG/DL (ref 6–23)
CALCIUM SERPL-MCNC: 9.4 MG/DL (ref 8.6–10.2)
CHLORIDE BLD-SCNC: 107 MMOL/L (ref 98–107)
CHOLESTEROL: 187 MG/DL
CO2: 21 MMOL/L (ref 22–29)
CREAT SERPL-MCNC: 1 MG/DL (ref 0.7–1.2)
EOSINOPHILS ABSOLUTE: 0.09 K/UL (ref 0.05–0.5)
EOSINOPHILS RELATIVE PERCENT: 2 % (ref 0–6)
GFR SERPL CREATININE-BSD FRML MDRD: >60 ML/MIN/1.73M2
GLUCOSE BLD-MCNC: 96 MG/DL (ref 74–99)
HCT VFR BLD CALC: 43.9 % (ref 37–54)
HDLC SERPL-MCNC: 47 MG/DL
HEMOGLOBIN: 14.7 G/DL (ref 12.5–16.5)
IMMATURE GRANULOCYTES: 1 % (ref 0–5)
LDL CHOLESTEROL: 103 MG/DL
LYMPHOCYTES ABSOLUTE: 0.95 K/UL (ref 1.5–4)
LYMPHOCYTES RELATIVE PERCENT: 22 % (ref 20–42)
MCH RBC QN AUTO: 33.7 PG (ref 26–35)
MCHC RBC AUTO-ENTMCNC: 33.5 G/DL (ref 32–34.5)
MCV RBC AUTO: 100.7 FL (ref 80–99.9)
MONOCYTES ABSOLUTE: 0.38 K/UL (ref 0.1–0.95)
MONOCYTES RELATIVE PERCENT: 9 % (ref 2–12)
NEUTROPHILS ABSOLUTE: 2.82 K/UL (ref 1.8–7.3)
NEUTROPHILS RELATIVE PERCENT: 66 % (ref 43–80)
PDW BLD-RTO: 13.3 % (ref 11.5–15)
PLATELET # BLD: 205 K/UL (ref 130–450)
PMV BLD AUTO: 10.6 FL (ref 7–12)
POTASSIUM SERPL-SCNC: 4.6 MMOL/L (ref 3.5–5)
PROSTATE SPECIFIC ANTIGEN: 5.12 NG/ML (ref 0–4)
RBC # BLD: 4.36 M/UL (ref 3.8–5.8)
SODIUM BLD-SCNC: 142 MMOL/L (ref 132–146)
TOTAL PROTEIN: 7.5 G/DL (ref 6.4–8.3)
TRIGL SERPL-MCNC: 185 MG/DL
VLDLC SERPL CALC-MCNC: 37 MG/DL
WBC # BLD: 4.3 K/UL (ref 4.5–11.5)

## 2024-01-26 RX ORDER — SERTRALINE HYDROCHLORIDE 100 MG/1
100 TABLET, FILM COATED ORAL DAILY
Qty: 90 TABLET | Refills: 1 | Status: SHIPPED | OUTPATIENT
Start: 2024-01-26

## 2024-01-26 RX ORDER — LISINOPRIL 30 MG/1
30 TABLET ORAL DAILY
Qty: 90 TABLET | Refills: 1 | Status: SHIPPED | OUTPATIENT
Start: 2024-01-26

## 2024-01-26 RX ORDER — MERCAPTOPURINE 50 MG/1
125 TABLET ORAL DAILY
Qty: 225 TABLET | Refills: 1 | Status: SHIPPED | OUTPATIENT
Start: 2024-01-26

## 2024-01-26 RX ORDER — METOPROLOL SUCCINATE 50 MG/1
50 TABLET, EXTENDED RELEASE ORAL DAILY
Qty: 90 TABLET | Refills: 1 | Status: SHIPPED
Start: 2024-01-26 | End: 2024-01-26

## 2024-01-26 RX ORDER — PROPRANOLOL HYDROCHLORIDE 40 MG/1
40 TABLET ORAL 2 TIMES DAILY
Qty: 60 TABLET | Refills: 0 | Status: SHIPPED | OUTPATIENT
Start: 2024-01-26

## 2024-01-26 RX ORDER — FOLIC ACID 1 MG/1
1000 TABLET ORAL DAILY
Qty: 90 TABLET | Refills: 1 | Status: SHIPPED | OUTPATIENT
Start: 2024-01-26

## 2024-01-26 RX ORDER — DICYCLOMINE HYDROCHLORIDE 10 MG/1
10 CAPSULE ORAL 2 TIMES DAILY
COMMUNITY
Start: 2024-01-24

## 2024-01-26 RX ORDER — ATORVASTATIN CALCIUM 10 MG/1
10 TABLET, FILM COATED ORAL DAILY
Qty: 90 TABLET | Refills: 1 | Status: SHIPPED | OUTPATIENT
Start: 2024-01-26

## 2024-01-26 ASSESSMENT — PATIENT HEALTH QUESTIONNAIRE - PHQ9
2. FEELING DOWN, DEPRESSED OR HOPELESS: 0
SUM OF ALL RESPONSES TO PHQ QUESTIONS 1-9: 0
1. LITTLE INTEREST OR PLEASURE IN DOING THINGS: 0
SUM OF ALL RESPONSES TO PHQ QUESTIONS 1-9: 0
SUM OF ALL RESPONSES TO PHQ9 QUESTIONS 1 & 2: 0
SUM OF ALL RESPONSES TO PHQ QUESTIONS 1-9: 0
SUM OF ALL RESPONSES TO PHQ QUESTIONS 1-9: 0

## 2024-01-26 NOTE — TELEPHONE ENCOUNTER
Please call patient.  I sent propranolol to his pharmacy to replace metoprolol which he has been taking.  I reviewed his chart more and I do not see any strong reason that he was on metoprolol.  It looks like this was started for blood pressure.  He does not have a history of coronary artery disease, atrial fibrillation, tachycardia, etc.  Therefore I think it is safe to just switch to propranolol.  I will want to increase the strength of propranolol over time if he tolerates it.  Try the starter dose that I prescribed over the next month and let me know how it is going in a few weeks.  I know he is going to [place] soon.  I likely can send prescriptions to [place] to a local pharmacy if needed while he is down there.

## 2024-01-26 NOTE — PROGRESS NOTES
Start date: 9/3/1969     Last attempt to quit: 1975     Years since quittin.1    Smokeless tobacco: Never    Tobacco comments:     smoked about 1 ppd cigarettes for about 10-15 yrs, cigars about 5/week for past 20+ yrs   Vaping Use    Vaping Use: Never used   Substance Use Topics    Alcohol use: Yes     Alcohol/week: 8.0 standard drinks of alcohol     Types: 8 Cans of beer per week     Comment: coiple a day sometimes     Drug use: Never       Chart reviewed and updated where appropriate for PMH, Fam, and Soc Hx.  _________________________________________________________  ROS: POSITIVE: As in the HPI.   Otherwise Pertinent negatives are negative.    __________________________________________________________  Physical Exam   Constitutional:    He is oriented to person, place, and time.    He appears well-developed and well-nourished.   Eyes:    Conjunctivae are normal.    Pupils are equal, round, and reactive to light.    EOMI.   Neck:    Normal range of motion.    No thyromegaly or nodules noted.    No bruit. No LAD.  Cardiovascular:    Normal rate, regular rhythm and normal heart sounds.     No murmur. No gallop and no friction rub.   Pulmonary/Chest:    Effort normal and breath sounds normal.    No wheezes. No rales or rhonchi.  Abdominal:    Soft. Bowel sounds are normal.    No distension. No tenderness.   Musculoskeletal:    Normal range of motion.     No joint swelling noted.    No peripheral edema.  Neurological:    He is A&Ox3.   Motor and sensation grossly intact.     Normal Gait.  Mild resting tremor, worsened with movement as in the HPI.  Denies Parkinson symptoms  Skin:    Skin is warm and dry.    No rashes, lesions.  Psychiatric:    He has a normal mood and affect.    Normal groom and dress. No SI or HI.   ________________________________________________________    This note may have been created using dictation software. Efforts were made to reduce errors, but some may persist.

## 2024-01-29 RX ORDER — METOPROLOL SUCCINATE 50 MG/1
50 TABLET, EXTENDED RELEASE ORAL DAILY
Qty: 90 TABLET | Refills: 1
Start: 2024-01-29

## 2024-01-29 NOTE — TELEPHONE ENCOUNTER
Alright, lets revisit it when he gets back from [place].   The propranolol will do the same thing to help with tachycardia.  Its the same class of medication as metoprolol.

## 2024-01-29 NOTE — TELEPHONE ENCOUNTER
Patient returned call.  Patient does not want to switched medication at this time.  Patient states he is leaving for [place] on Monday for a couple weeks and does not think if would be a good idea to change medication prior to leaving.  Patient stated he had been started on metoprolol for an episode on having tachycardia.  Please advise.

## 2024-02-06 ENCOUNTER — TELEPHONE (OUTPATIENT)
Dept: FAMILY MEDICINE CLINIC | Age: 74
End: 2024-02-06

## 2024-02-06 NOTE — TELEPHONE ENCOUNTER
Spoke with [employer] that pt gets his lialda through.    I updated prescriber info with them and gave verbal order for pt to take 4 lialda capsules by mouth daily. Quantity 360 for 90 days and 1 refill.    They said they sent out meds to pt on 02/01/24 that was 4 caps daily.    I was informed that for refills on this, we can e-prescribe to [facility]. I added this to pt's preferred pharmacy list.    They are faxing us an application that doctor needs to fill out section 3 and 4 and fax back to have all of prescriber's info and rx info updated.  Will await that form.

## 2024-03-11 ENCOUNTER — TELEPHONE (OUTPATIENT)
Dept: FAMILY MEDICINE CLINIC | Age: 74
End: 2024-03-11

## 2024-03-11 NOTE — TELEPHONE ENCOUNTER
Patient called to report that he returned from [place].  Tomorrow he will stop metoprolol and start propranolol.  Advised patient to call in to office if he has any problems or questions.  Patient verbalizes understanding.

## 2024-04-04 ENCOUNTER — OFFICE VISIT (OUTPATIENT)
Dept: FAMILY MEDICINE CLINIC | Age: 74
End: 2024-04-04

## 2024-04-04 ENCOUNTER — TELEPHONE (OUTPATIENT)
Dept: FAMILY MEDICINE CLINIC | Age: 74
End: 2024-04-04

## 2024-04-04 VITALS
DIASTOLIC BLOOD PRESSURE: 86 MMHG | OXYGEN SATURATION: 96 % | WEIGHT: 218 LBS | HEIGHT: 69 IN | TEMPERATURE: 97.3 F | BODY MASS INDEX: 32.29 KG/M2 | SYSTOLIC BLOOD PRESSURE: 134 MMHG | HEART RATE: 54 BPM

## 2024-04-04 DIAGNOSIS — M26.609 TMJ (TEMPOROMANDIBULAR JOINT DISORDER): Primary | ICD-10-CM

## 2024-04-04 RX ORDER — PREDNISONE 10 MG/1
TABLET ORAL
Qty: 12 TABLET | Refills: 0 | Status: SHIPPED | OUTPATIENT
Start: 2024-04-04 | End: 2024-04-09

## 2024-04-04 ASSESSMENT — ENCOUNTER SYMPTOMS
COUGH: 0
NAUSEA: 0
SINUS PRESSURE: 0
EYES NEGATIVE: 1
WHEEZING: 0
ABDOMINAL PAIN: 0
COLOR CHANGE: 0
SORE THROAT: 0
SHORTNESS OF BREATH: 0
CHEST TIGHTNESS: 0

## 2024-04-04 NOTE — PROGRESS NOTES
[facility]     24  Kody Diana : 1950 Sex: male  Age: 73 y.o.    Chief Complaint   Patient presents with    Temporomandibular Joint Pain     Pain in jaw, both sides, mainly in the right;  Pain goes out into the teeth which makes his teeth kind of sensitive and makes his one ear hurt and his neck stiff  Constant ache in the jaw joint, ear, and neck       HPI  Patient presents today to express care complaining of temporomandibular joint area pain right greater than left.  States this started about 2 weeks ago.  Patient does admit to chronic neck pain over many years for which she is seeing massotherapy but does not feel it is related to the jaw.  Also has some soreness to his right ear since this jaw pain started.  He does plan on seeing chiropractic tomorrow.  Does admit to some teeth sensitivity as well as the ear discomfort which started about the same time as the jaw pain.  Does admit to sinus drainage which has been going on for a while.  Denies cough.  Denies shortness of breath.  No fever or chills.          Review of Systems   Constitutional:  Negative for chills and fever.   HENT:  Positive for congestion, ear pain and postnasal drip. Negative for sinus pressure and sore throat.    Eyes: Negative.    Respiratory:  Negative for cough, chest tightness, shortness of breath and wheezing.    Cardiovascular:  Negative for chest pain.   Gastrointestinal:  Negative for abdominal pain and nausea.   Musculoskeletal:  Negative for myalgias.        TMJ pain bilaterally right greater than left   Skin:  Negative for color change and rash.   Neurological:  Negative for headaches.               REST OF PERTINENT ROS GONE OVER AND WAS NEGATIVE.                 Current Outpatient Medications:     predniSONE (DELTASONE) 10 MG tablet, Take 3 tablets by mouth daily for 2 days, THEN 2 tablets daily for 2 days, THEN 1 tablet daily for 2 days., Disp: 12 tablet, Rfl: 0    atorvastatin (LIPITOR) 10 MG

## 2024-04-04 NOTE — TELEPHONE ENCOUNTER
It probably is from tension in his neck or from possibly TMJ pain.  However I cannot diagnose that without doing an exam and I do not want to miss something rare like a heart condition.  He ought to come through express care to have a full evaluation.  If this does seem like a neck spasm or TMJ, perhaps a muscle relaxer might help.  Not comfortable sending that without having him seen.

## 2024-04-04 NOTE — TELEPHONE ENCOUNTER
Kody has been experiencing some tension in his neck for over a week.  He is now having jaw pain that is radiating into the Right ear.  He believes it is coming from the tension in his neck.  He has tried Advil for the pain, but this in not helping much.  He is asking what you suggest?

## 2024-04-10 ENCOUNTER — TELEPHONE (OUTPATIENT)
Dept: FAMILY MEDICINE CLINIC | Age: 74
End: 2024-04-10

## 2024-04-10 DIAGNOSIS — M26.609 TMJ (TEMPOROMANDIBULAR JOINT DISORDER): Primary | ICD-10-CM

## 2024-04-10 DIAGNOSIS — G25.0 ESSENTIAL TREMOR: ICD-10-CM

## 2024-04-10 DIAGNOSIS — I10 ESSENTIAL HYPERTENSION: ICD-10-CM

## 2024-04-10 RX ORDER — BACLOFEN 5 MG/1
5 TABLET ORAL 3 TIMES DAILY
Qty: 30 TABLET | Refills: 0 | Status: SHIPPED | OUTPATIENT
Start: 2024-04-10 | End: 2024-04-20

## 2024-04-10 RX ORDER — PROPRANOLOL HYDROCHLORIDE 40 MG/1
40 TABLET ORAL 2 TIMES DAILY
Qty: 180 TABLET | Refills: 0 | Status: SHIPPED | OUTPATIENT
Start: 2024-04-10

## 2024-04-10 NOTE — TELEPHONE ENCOUNTER
Patient called for a refill on Propranolol. Dr Acosta d/c Metoprolol and gave him a 30 day Rx for Propranolol 40mg bid (see telephone encounter from 3/11/24 for documentation). Patient has used up hs 30 day supply and would like a refill sent to [facility].     Last Appointment:  1/26/2024  Future Appointments   Date Time Provider Department Center   7/30/2024  1:00 PM Serg Acosta MD [facility]   10/15/2024  7:30 AM Keven Reddy MD [facility]

## 2024-04-10 NOTE — TELEPHONE ENCOUNTER
Patient called back in, he remembered the name of the ENT he seen and it was Dr Sanchez. He would like to be referred to him if at all possible, (referral pended)

## 2024-04-10 NOTE — TELEPHONE ENCOUNTER
Patient called 4/4 w/ complaints of tension in his neck and pain in his jaw that extended to his ear. Dr Acosta advised him to come through [facility] for chuy.      Karla, Serg Gage MD   to [facility]  Clinical Pool       4/4/24 10:14 AM  Note      It probably is from tension in his neck or from possibly TMJ pain.  However I cannot diagnose that without doing an exam and I do not want to miss something rare like a heart condition.  He ought to come through express care to have a full evaluation.  If this does seem like a neck spasm or TMJ, perhaps a muscle relaxer might help.  Not comfortable sending that without having him seen.        Patient was seen through [facility] 4/4/24 by Dr Chery and was prescribed a course of Prednisone and told him to use warm compresses to the TMJ area. If not improving to follow up w/ dentist or ENT.     Kody has not improved w/ Prednisone. He is also seeing a chiropractor and a massotherapist but has not found relief yet.. He would like to  know if a muscle relaxer could be called in. He also requested an appt to follow up w/ Dr Acosta. He is agreeable to having a referral placed to ENT since Dr Acosta is out of the office.     Can you prescribe a muscle relaxer and send it to [facility] and place a referral to ENT. He does not have a preference, believes he may have seen an ENT years ago in [place].

## 2024-04-11 ENCOUNTER — TELEPHONE (OUTPATIENT)
Dept: FAMILY MEDICINE CLINIC | Age: 74
End: 2024-04-11

## 2024-04-11 NOTE — TELEPHONE ENCOUNTER
Hi Dr Katie Sanchez's office called and their ENT doctor's do not treat this patient's dx and advised to refer him to an oral surgeon     Thanks

## 2024-04-11 NOTE — TELEPHONE ENCOUNTER
Spoke w/ Kody, he does not want to be referred to an oral surgeon at this time. He will try using the Baclofen you prescribed. He also went for a massage today. If he continues to have issues he will call the office next week to see what Dr Acosta recommends.

## 2024-04-22 DIAGNOSIS — M26.609 TMJ (TEMPOROMANDIBULAR JOINT DISORDER): ICD-10-CM

## 2024-04-22 RX ORDER — BACLOFEN 5 MG/1
5 TABLET ORAL 3 TIMES DAILY
Qty: 30 TABLET | Refills: 0 | Status: SHIPPED | OUTPATIENT
Start: 2024-04-22 | End: 2024-05-02

## 2024-04-22 RX ORDER — DICYCLOMINE HYDROCHLORIDE 10 MG/1
10 CAPSULE ORAL 2 TIMES DAILY
Qty: 220 CAPSULE | Refills: 0 | Status: SHIPPED | OUTPATIENT
Start: 2024-04-22

## 2024-04-22 NOTE — TELEPHONE ENCOUNTER
Last Appointment:  1/26/2024    Future appts:  Future Appointments   Date Time Provider Department Center   7/30/2024  1:00 PM Serg Acosta MD [facility]   10/15/2024  7:30 AM Keven Reddy MD [facility]        Patient calling in asking for a refill on his dicyclomine. He mentioned he is trying to get all his prescriptions lined up so they all get refilled at the same time so I gave him enough to get him to his appointment plus a little more.    Patient is also asking for a refill on his Baclofen. He is still having an issue with his neck. He was seen by Dr Chery and was prescribed prednisone which didn't do much. Then Dr Wilson prescribed him the Baclofen and that did give him relief, but not 100%. Patient is seeing a chiropractor as well as a massotherapist.   Medication is pended just for a 10 day supply.

## 2024-05-14 ENCOUNTER — TELEPHONE (OUTPATIENT)
Dept: FAMILY MEDICINE CLINIC | Age: 74
End: 2024-05-14

## 2024-05-14 NOTE — TELEPHONE ENCOUNTER
If no fevers, no worsening facial pain, no general worsening of symptoms, then would recommend flonase nasal spray once daily in each nostril for one to two weeks.      It'll take a couple days to start helping generally.

## 2024-05-14 NOTE — TELEPHONE ENCOUNTER
Kody called to let you know that his neck, jaw, and ear tension have improved. He used Bacolofen for 20 days.     He would like to know what you recommend for head congestion. States he had a cold in Feb or March and now has another one. Symptoms started last week. Cough has pretty much resolved but he has a lot of head congestion and \"huge\" amounts of sinus drainage. He has been taking OTC cough syrup for cough and congestion. He would like to know if he should \"ride it out\" or what you recommend.

## 2024-06-28 ENCOUNTER — TELEPHONE (OUTPATIENT)
Dept: FAMILY MEDICINE CLINIC | Age: 74
End: 2024-06-28

## 2024-06-28 NOTE — TELEPHONE ENCOUNTER
Kody is scheduled for a colonoscopy on Tuesday next week.  He was instructed to hold his meds prior? He is asking if he should hold the Eliquis as well?

## 2024-06-30 NOTE — TELEPHONE ENCOUNTER
Yes, he should hold the eliquis as his surgeon recommends.   Typically this needs held at least 2 days.

## 2024-07-01 NOTE — TELEPHONE ENCOUNTER
Patient was informed to stop eliquis two days prior to procedure- patient stated that he stopped eliquis on Saturday 6/29/2024.

## 2024-07-01 NOTE — TELEPHONE ENCOUNTER
I tried to reach Kody, but had to leave a message to call back to the nurse line regarding his Eliquis.

## 2024-07-03 DIAGNOSIS — I10 ESSENTIAL HYPERTENSION: ICD-10-CM

## 2024-07-03 DIAGNOSIS — G25.0 ESSENTIAL TREMOR: ICD-10-CM

## 2024-07-03 RX ORDER — PROPRANOLOL HYDROCHLORIDE 40 MG/1
40 TABLET ORAL 2 TIMES DAILY
Qty: 180 TABLET | Refills: 1 | Status: SHIPPED | OUTPATIENT
Start: 2024-07-03

## 2024-07-03 NOTE — TELEPHONE ENCOUNTER
Patient requesting refill.    Last Appointment:  Visit date not found  Future Appointments   Date Time Provider Department Center   7/30/2024  1:00 PM Serg Acosta MD [facility]   10/15/2024  7:30 AM Keven Reddy MD [facility]

## 2024-07-30 ENCOUNTER — OFFICE VISIT (OUTPATIENT)
Dept: FAMILY MEDICINE CLINIC | Age: 74
End: 2024-07-30
Payer: MEDICARE

## 2024-07-30 VITALS
HEART RATE: 56 BPM | WEIGHT: 217 LBS | HEIGHT: 67 IN | DIASTOLIC BLOOD PRESSURE: 68 MMHG | OXYGEN SATURATION: 96 % | TEMPERATURE: 98 F | BODY MASS INDEX: 34.06 KG/M2 | SYSTOLIC BLOOD PRESSURE: 110 MMHG

## 2024-07-30 DIAGNOSIS — K51.80 OTHER ULCERATIVE COLITIS WITHOUT COMPLICATION (HCC): ICD-10-CM

## 2024-07-30 DIAGNOSIS — E55.9 VITAMIN D DEFICIENCY: ICD-10-CM

## 2024-07-30 DIAGNOSIS — G89.29 CHRONIC BILATERAL LOW BACK PAIN WITH BILATERAL SCIATICA: ICD-10-CM

## 2024-07-30 DIAGNOSIS — M54.41 CHRONIC BILATERAL LOW BACK PAIN WITH BILATERAL SCIATICA: ICD-10-CM

## 2024-07-30 DIAGNOSIS — Z87.891 PERSONAL HISTORY OF TOBACCO USE: ICD-10-CM

## 2024-07-30 DIAGNOSIS — R73.03 PREDIABETES: ICD-10-CM

## 2024-07-30 DIAGNOSIS — R20.0 LEG NUMBNESS: ICD-10-CM

## 2024-07-30 DIAGNOSIS — G25.0 ESSENTIAL TREMOR: ICD-10-CM

## 2024-07-30 DIAGNOSIS — R53.82 CHRONIC FATIGUE: ICD-10-CM

## 2024-07-30 DIAGNOSIS — I10 ESSENTIAL HYPERTENSION: Primary | ICD-10-CM

## 2024-07-30 DIAGNOSIS — Z86.711 HISTORY OF PULMONARY EMBOLISM: ICD-10-CM

## 2024-07-30 DIAGNOSIS — M54.42 CHRONIC BILATERAL LOW BACK PAIN WITH BILATERAL SCIATICA: ICD-10-CM

## 2024-07-30 DIAGNOSIS — E78.2 MIXED HYPERLIPIDEMIA: ICD-10-CM

## 2024-07-30 DIAGNOSIS — F41.9 ANXIETY: ICD-10-CM

## 2024-07-30 LAB
ALBUMIN: 3.9 G/DL (ref 3.5–5.2)
ALP BLD-CCNC: 50 U/L (ref 40–129)
ALT SERPL-CCNC: 70 U/L (ref 0–40)
ANION GAP SERPL CALCULATED.3IONS-SCNC: 17 MMOL/L (ref 7–16)
AST SERPL-CCNC: 39 U/L (ref 0–39)
BASOPHILS ABSOLUTE: 0.01 K/UL (ref 0–0.2)
BASOPHILS RELATIVE PERCENT: 0 % (ref 0–2)
BILIRUB SERPL-MCNC: 0.7 MG/DL (ref 0–1.2)
BUN BLDV-MCNC: 18 MG/DL (ref 6–23)
CALCIUM SERPL-MCNC: 8.9 MG/DL (ref 8.6–10.2)
CHLORIDE BLD-SCNC: 103 MMOL/L (ref 98–107)
CO2: 20 MMOL/L (ref 22–29)
CREAT SERPL-MCNC: 1 MG/DL (ref 0.7–1.2)
EOSINOPHILS ABSOLUTE: 0.07 K/UL (ref 0.05–0.5)
EOSINOPHILS RELATIVE PERCENT: 2 % (ref 0–6)
FOLATE: >20 NG/ML (ref 4.8–24.2)
GFR, ESTIMATED: 84 ML/MIN/1.73M2
GLUCOSE BLD-MCNC: 90 MG/DL (ref 74–99)
HBA1C MFR BLD: 6.3 % (ref 4–5.6)
HCT VFR BLD CALC: 43 % (ref 37–54)
HEMOGLOBIN: 14.1 G/DL (ref 12.5–16.5)
IMMATURE GRANULOCYTES %: 0 % (ref 0–5)
IMMATURE GRANULOCYTES ABSOLUTE: <0.03 K/UL (ref 0–0.58)
LYMPHOCYTES ABSOLUTE: 0.75 K/UL (ref 1.5–4)
LYMPHOCYTES RELATIVE PERCENT: 19 % (ref 20–42)
MCH RBC QN AUTO: 33.9 PG (ref 26–35)
MCHC RBC AUTO-ENTMCNC: 32.8 G/DL (ref 32–34.5)
MCV RBC AUTO: 103.4 FL (ref 80–99.9)
MONOCYTES ABSOLUTE: 0.33 K/UL (ref 0.1–0.95)
MONOCYTES RELATIVE PERCENT: 8 % (ref 2–12)
NEUTROPHILS ABSOLUTE: 2.78 K/UL (ref 1.8–7.3)
NEUTROPHILS RELATIVE PERCENT: 70 % (ref 43–80)
PDW BLD-RTO: 14 % (ref 11.5–15)
PLATELET # BLD: 198 K/UL (ref 130–450)
PMV BLD AUTO: 10.8 FL (ref 7–12)
POTASSIUM SERPL-SCNC: 4.7 MMOL/L (ref 3.5–5)
RBC # BLD: 4.16 M/UL (ref 3.8–5.8)
SODIUM BLD-SCNC: 140 MMOL/L (ref 132–146)
TOTAL PROTEIN: 7 G/DL (ref 6.4–8.3)
TSH SERPL DL<=0.05 MIU/L-ACNC: 1.38 UIU/ML (ref 0.27–4.2)
VITAMIN B-12: 368 PG/ML (ref 211–946)
VITAMIN D 25-HYDROXY: 26.1 NG/ML (ref 30–100)
WBC # BLD: 4 K/UL (ref 4.5–11.5)

## 2024-07-30 PROCEDURE — G8417 CALC BMI ABV UP PARAM F/U: HCPCS | Performed by: FAMILY MEDICINE

## 2024-07-30 PROCEDURE — 1123F ACP DISCUSS/DSCN MKR DOCD: CPT | Performed by: FAMILY MEDICINE

## 2024-07-30 PROCEDURE — 4004F PT TOBACCO SCREEN RCVD TLK: CPT | Performed by: FAMILY MEDICINE

## 2024-07-30 PROCEDURE — 3074F SYST BP LT 130 MM HG: CPT | Performed by: FAMILY MEDICINE

## 2024-07-30 PROCEDURE — 3078F DIAST BP <80 MM HG: CPT | Performed by: FAMILY MEDICINE

## 2024-07-30 PROCEDURE — 99214 OFFICE O/P EST MOD 30 MIN: CPT | Performed by: FAMILY MEDICINE

## 2024-07-30 PROCEDURE — 3017F COLORECTAL CA SCREEN DOC REV: CPT | Performed by: FAMILY MEDICINE

## 2024-07-30 PROCEDURE — G8427 DOCREV CUR MEDS BY ELIG CLIN: HCPCS | Performed by: FAMILY MEDICINE

## 2024-07-30 RX ORDER — ATORVASTATIN CALCIUM 10 MG/1
10 TABLET, FILM COATED ORAL DAILY
Qty: 90 TABLET | Refills: 1 | Status: SHIPPED | OUTPATIENT
Start: 2024-07-30

## 2024-07-30 RX ORDER — LISINOPRIL 30 MG/1
30 TABLET ORAL DAILY
Qty: 90 TABLET | Refills: 1 | Status: SHIPPED | OUTPATIENT
Start: 2024-07-30

## 2024-07-30 RX ORDER — FOLIC ACID 1 MG/1
1000 TABLET ORAL DAILY
Qty: 90 TABLET | Refills: 1 | Status: SHIPPED | OUTPATIENT
Start: 2024-07-30

## 2024-07-30 RX ORDER — PROPRANOLOL HYDROCHLORIDE 40 MG/1
40 TABLET ORAL 2 TIMES DAILY
Qty: 180 TABLET | Refills: 1 | Status: SHIPPED | OUTPATIENT
Start: 2024-07-30

## 2024-07-30 RX ORDER — SERTRALINE HYDROCHLORIDE 100 MG/1
100 TABLET, FILM COATED ORAL DAILY
Qty: 90 TABLET | Refills: 1 | Status: SHIPPED | OUTPATIENT
Start: 2024-07-30

## 2024-07-30 RX ORDER — MERCAPTOPURINE 50 MG/1
125 TABLET ORAL DAILY
Qty: 225 TABLET | Refills: 1 | Status: SHIPPED | OUTPATIENT
Start: 2024-07-30

## 2024-07-30 NOTE — PROGRESS NOTES
nodule 2020    Mixed hyperlipidemia 2019    SOB (shortness of breath)     Ulcerative colitis (HCC) 2019       Family History   Problem Relation Age of Onset    COPD Mother        Past Surgical History:   Procedure Laterality Date    COLONOSCOPY      DENTAL SURGERY      extraction    PULMONARY EMBOLISM LYSIS  2020    Dr Velasquez       Social History     Tobacco Use    Smoking status: Some Days     Current packs/day: 0.00     Average packs/day: 1 pack/day for 30.0 years (30.0 ttl pk-yrs)     Types: Cigars, Cigarettes     Start date: 9/3/1969     Last attempt to quit: 1975     Years since quittin.6    Smokeless tobacco: Never    Tobacco comments:     smoked about 1 ppd cigarettes for about 10-15 yrs, cigars about 5/week for past 20+ yrs   Vaping Use    Vaping Use: Never used   Substance Use Topics    Alcohol use: Yes     Alcohol/week: 8.0 standard drinks of alcohol     Types: 8 Cans of beer per week     Comment: coiple a day sometimes     Drug use: Never       Chart reviewed and updated where appropriate for PMH, Fam, and Soc Hx.  _________________________________________________________  ROS: POSITIVE: As in the HPI.   Otherwise Pertinent negatives are negative.    __________________________________________________________  Physical Exam   Constitutional:    He is oriented to person, place, and time.    He appears well-developed and well-nourished.   Eyes:    Conjunctivae are normal.    Pupils are equal, round, and reactive to light.    EOMI.   Neck:    Normal range of motion.    No thyromegaly or nodules noted.    No bruit. No LAD.  Cardiovascular:    Normal rate, regular rhythm and normal heart sounds.     No murmur. No gallop and no friction rub.   Pulmonary/Chest:    Effort normal and breath sounds normal.    No wheezes. No rales or rhonchi.  Abdominal:    Soft. Bowel sounds are normal.    No distension. No tenderness.   Musculoskeletal:    Normal range of motion.     No joint swelling

## 2024-08-05 DIAGNOSIS — R74.01 ELEVATED ALT MEASUREMENT: Primary | ICD-10-CM

## 2024-08-05 RX ORDER — DICYCLOMINE HYDROCHLORIDE 10 MG/1
CAPSULE ORAL
Qty: 220 CAPSULE | Refills: 1 | Status: SHIPPED | OUTPATIENT
Start: 2024-08-05

## 2024-08-05 NOTE — TELEPHONE ENCOUNTER
Last Appointment:  7/30/2024  Future Appointments   Date Time Provider Department Center   10/15/2024  7:30 AM Keven Reddy MD [facility]   2/3/2025  8:00 AM Serg Acosta MD [facility]

## 2024-08-06 DIAGNOSIS — M54.50 LUMBAR BACK PAIN: Primary | ICD-10-CM

## 2024-08-08 ENCOUNTER — TELEPHONE (OUTPATIENT)
Dept: PHYSICAL THERAPY | Age: 74
End: 2024-08-08

## 2024-08-20 ENCOUNTER — TELEPHONE (OUTPATIENT)
Dept: PHYSICAL THERAPY | Age: 74
End: 2024-08-20

## 2024-08-23 RX ORDER — TAMSULOSIN HYDROCHLORIDE 0.4 MG/1
0.8 CAPSULE ORAL DAILY
Qty: 180 CAPSULE | Refills: 1 | Status: SHIPPED | OUTPATIENT
Start: 2024-08-23

## 2024-08-23 NOTE — TELEPHONE ENCOUNTER
Patient called in and stated that he had gotten refills on his other scripts however he needs refills on his tamsulosin he is out.

## 2024-08-29 ENCOUNTER — TELEPHONE (OUTPATIENT)
Dept: PHYSICAL THERAPY | Age: 74
End: 2024-08-29

## 2024-10-02 ENCOUNTER — TELEPHONE (OUTPATIENT)
Dept: FAMILY MEDICINE CLINIC | Age: 74
End: 2024-10-02

## 2024-10-02 DIAGNOSIS — F17.210 CIGARETTE NICOTINE DEPENDENCE WITHOUT COMPLICATION: Primary | ICD-10-CM

## 2024-10-02 DIAGNOSIS — Z87.891 HISTORY OF SMOKING: ICD-10-CM

## 2024-10-02 NOTE — TELEPHONE ENCOUNTER
Patient called to request a low dose lung CT at [facility]. Ordered pended. Unsure if additional Dx is needed/

## 2024-10-07 ENCOUNTER — EVALUATION (OUTPATIENT)
Dept: PHYSICAL THERAPY | Age: 74
End: 2024-10-07
Payer: MEDICARE

## 2024-10-07 DIAGNOSIS — M54.50 LUMBAR BACK PAIN: Primary | ICD-10-CM

## 2024-10-07 PROCEDURE — 97161 PT EVAL LOW COMPLEX 20 MIN: CPT | Performed by: PHYSICAL THERAPIST

## 2024-10-07 NOTE — PROGRESS NOTES
[facility]   Phone: [phone]   Fax: [phone]      Physical Therapy Treatment Note    Date: 10/7/2024  Patient Name: Kody Diana  : 1950   MRN: [id number]  DOInjury: years  DOSx: NA  Referring Provider: Deirdre Wilson MD  [address] [place] [address]     Medical Diagnosis: M54.50 (ICD-10-CM) - Lumbar back pain     Outcome Measure:  Oswestry 10%     S: see eval  O:  Time 1047 - 1111     Visit 1/10  Repeat outcome measure at mid point and end.    Pain 2/10     ROM      Modalities      MH + ES            Exercise      ALL EXERCISE DONE WITH DRAW-IN TECHNIQUE                            Functional activities To aid in reaching , pushing, pulling tasks at home     ROWS: H  \"    ROWS: M  \"    ROWS: L  \"    Obliques - high  \"    Obliques - low  \"     THEREX     Bike      Punches      Lat pulldowns      Triceps ext standing      Marching            Trunk ext TB      Trunk flex TB      Hip abd      Hip EXT      TG Squats                  A:  Tolerated well.      P: Continue with rehab plan  Griselda Lua, PT  PT DPT [id number]    Treatment Charges: Mins Units   Initial Evaluation 24 1   Re-Evaluation     Ther Exercise         TE     Manual Therapy     MT     Ther Activities        TA     Gait Training          GT     Neuro Re-education NR     Modalities     Non-Billable Service Time     Other     Total Time/Units 24 1         
SLR           Right []+ / [x] -    Left []+ / [x] -     [] MACARENA          Right []+ / [x] -    Left []+ / [x] -  [] S-I Compression          Right []+ / [] -    Left []+ / [] -   [] Other: []+ / [] -           ASSESSMENT     Outcome Measure:   Modified Oswestry 10% disability    Problems:   Pain reported 2-4/10  ROM decreased   Strength decreased  Decreased functional ability with  sitting tolerance on a hard chair, standing for length of time, walking for length of time     [x] There are no barriers affecting plan of care or recovery    [] Barriers to this patient's plan of care or recovery include.    Domestic Concerns:  [x] No  [] Yes:        Long Term goals ( 5 weeks  )  Decrease reported pain to 0-2/10  Increase ROM by 10%  Increase Strength to 5/5   Able to perform/complete the following functions/tasks: pt able to stand 30 + minutes with no to min pain/limitation.  Pt able to sit > 30 minutes with no to min pain/limitation. Pt able to walk 30 minutes with no to min pain/limitation.    Oswestry Low Back Disability Questionnaire 5% disability   Independent with Home Exercise Programs    Rehab Potential: [x] Good  [] Fair  [] Poor    PLAN       Treatment Plan:   [x] Therapeutic Exercise  [x] Therapeutic Activity  [x] Neuromuscular Re-education   [x] Gait Training  [x] Balance Training  [x] Aerobic conditioning  [x] Manual Therapy  [x] Massage/Fascial release   [] Work/Sport specific activities    [] Pain Neuroscience [x] Cold/hotpack  [] Vasocompression  [x] Electrical Stimulation  [] Lumbar/Cervical Traction  [] Ultrasound   [] Iontophoresis: 4 mg/mL Dexamethasone Sodium Phosphate 40-80 mAmin        [x] Instruction in HEP      []  Medication allergies reviewed for use of Dexamethasone Sodium Phosphate 4mg/ml  with iontophoresis treatments.  Patient is not allergic.      The following CPT codes are likely to be used in the care of this patient: 36423 PT Evaluation: Low Complexity   11599 PT Re-Evaluation   19460

## 2024-10-09 ENCOUNTER — TREATMENT (OUTPATIENT)
Dept: PHYSICAL THERAPY | Age: 74
End: 2024-10-09
Payer: MEDICARE

## 2024-10-09 DIAGNOSIS — M54.50 LUMBAR BACK PAIN: Primary | ICD-10-CM

## 2024-10-09 PROCEDURE — 97530 THERAPEUTIC ACTIVITIES: CPT

## 2024-10-09 PROCEDURE — 97110 THERAPEUTIC EXERCISES: CPT

## 2024-10-09 NOTE — PROGRESS NOTES
[facility]   Phone: [phone]   Fax: [phone]      Physical Therapy Treatment Note    Date: 10/9/2024  Patient Name: Kody Diana  : 1950   MRN: [id number]  DOInjury: years  DOSx: NA  Referring Provider:  Deirdre Wilson MD  [address] [place] [address]          Medical Diagnosis: M54.50 (ICD-10-CM) - Lumbar back pain     Outcome Measure:  Oswestry 10%     S: Pt reports stiffness in low back, knees and hips. No pain today.   O:  Time 3345-2049     Visit 2/10  Repeat outcome measure at mid point and end.    Pain See above      ROM      Modalities      MH + ES            Exercise      ALL EXERCISE DONE WITH DRAW-IN TECHNIQUE                            Functional activities To aid in reaching , pushing, pulling tasks at home     ROWS: H  \"    ROWS: M 2 x 10  RTB    ROWS: L  \"    Cross counrty ski  2 x 10  RTB \"    Step ups X 20 each   6 inch      THEREX     Bike 5 min      Punches      Lat pulldowns      Triceps ext standing      Marching            LTR X 10     SLR 2 x 10     PPT X 20     Bridges 2 x 10      LAQ 2 x 10      Hip add X 20      Hip abd  2 x 10 RTB     Hip abduction standing 2 x 10     Hip extension standing 2 x 10            Trunk ext TB      Trunk flex TB                                    A:  Tolerated well.      P: Continue with rehab plan  AMANDA CELIS, PTA  [id number]  Treatment Charges: Mins Units   Initial Evaluation     Re-Evaluation     Ther Exercise         TE 28 2   Manual Therapy     MT     Ther Activities        TA 10 1   Gait Training          GT     Neuro Re-education NR     Modalities     Non-Billable Service Time     Other     Total Time/Units 38 3

## 2024-10-14 ENCOUNTER — TREATMENT (OUTPATIENT)
Dept: PHYSICAL THERAPY | Age: 74
End: 2024-10-14
Payer: MEDICARE

## 2024-10-14 DIAGNOSIS — M54.50 LUMBAR BACK PAIN: Primary | ICD-10-CM

## 2024-10-14 PROCEDURE — 97110 THERAPEUTIC EXERCISES: CPT | Performed by: PHYSICAL THERAPIST

## 2024-10-14 NOTE — PROGRESS NOTES
[facility]   Phone: [phone]   Fax: [phone]      Physical Therapy Treatment Note    Date: 10/14/2024  Patient Name: Kody Diana  : 1950   MRN: [id number]  DOInjury: years  DOSx: NA  Referring Provider:  Deirdre Wilson MD  [address] [place] [address]          Medical Diagnosis: M54.50 (ICD-10-CM) - Lumbar back pain     Outcome Measure:  Oswestry 10%     S: Pt reports no pain today just stiffness.   O:  Time 1045 - 1120      Visit 3/10  Repeat outcome measure at mid point and end.    Pain See above      ROM      Modalities      MH + ES            Exercise      ALL EXERCISE DONE WITH DRAW-IN TECHNIQUE                            Functional activities To aid in reaching , pushing, pulling tasks at home     ROWS: H  \"    ROWS: M 2 x 10  RTB    ROWS: L  \"    Cross counrty ski  2 x 10  RTB \"    Step ups  6 inch      THEREX     Bike 8  min      Punches      Lat pulldowns      Triceps ext standing      Marching            LTR X 10 HEP    SLR 2 x 10     PPT     Bridges 2 x 10  HEP    LAQ 2 x 10      Hip add     Hip abd  RTB     Hip abduction standing 2 x 10 HEP    Hip extension standing 2 x 10            Trunk ext TB      Trunk flex TB                                    A:  Tolerated well.  Pt given printed HEP with exercises as noted above.      P: Continue with rehab plan  Griselda Lua, PT  [id number]  Treatment Charges: Mins Units   Initial Evaluation     Re-Evaluation     Ther Exercise         TE 35 2   Manual Therapy     MT     Ther Activities        TA     Gait Training          GT     Neuro Re-education NR     Modalities     Non-Billable Service Time     Other     Total Time/Units 35 2

## 2024-10-15 ENCOUNTER — OFFICE VISIT (OUTPATIENT)
Dept: CARDIOLOGY CLINIC | Age: 74
End: 2024-10-15
Payer: MEDICARE

## 2024-10-15 VITALS
HEART RATE: 49 BPM | HEIGHT: 67 IN | RESPIRATION RATE: 18 BRPM | BODY MASS INDEX: 34.03 KG/M2 | DIASTOLIC BLOOD PRESSURE: 87 MMHG | SYSTOLIC BLOOD PRESSURE: 120 MMHG | WEIGHT: 216.8 LBS

## 2024-10-15 DIAGNOSIS — I35.9 AORTIC VALVE DISEASE: Primary | ICD-10-CM

## 2024-10-15 PROBLEM — F41.9 ANXIETY: Status: RESOLVED | Noted: 2019-06-05 | Resolved: 2024-10-15

## 2024-10-15 PROCEDURE — 99213 OFFICE O/P EST LOW 20 MIN: CPT | Performed by: INTERNAL MEDICINE

## 2024-10-15 PROCEDURE — 1123F ACP DISCUSS/DSCN MKR DOCD: CPT | Performed by: INTERNAL MEDICINE

## 2024-10-15 PROCEDURE — 3074F SYST BP LT 130 MM HG: CPT | Performed by: INTERNAL MEDICINE

## 2024-10-15 PROCEDURE — G8417 CALC BMI ABV UP PARAM F/U: HCPCS | Performed by: INTERNAL MEDICINE

## 2024-10-15 PROCEDURE — 4004F PT TOBACCO SCREEN RCVD TLK: CPT | Performed by: INTERNAL MEDICINE

## 2024-10-15 PROCEDURE — 3079F DIAST BP 80-89 MM HG: CPT | Performed by: INTERNAL MEDICINE

## 2024-10-15 PROCEDURE — 3017F COLORECTAL CA SCREEN DOC REV: CPT | Performed by: INTERNAL MEDICINE

## 2024-10-15 PROCEDURE — G8427 DOCREV CUR MEDS BY ELIG CLIN: HCPCS | Performed by: INTERNAL MEDICINE

## 2024-10-15 PROCEDURE — 93000 ELECTROCARDIOGRAM COMPLETE: CPT | Performed by: INTERNAL MEDICINE

## 2024-10-15 PROCEDURE — G8484 FLU IMMUNIZE NO ADMIN: HCPCS | Performed by: INTERNAL MEDICINE

## 2024-10-15 NOTE — PROGRESS NOTES
Chief Complaint   Patient presents with    Valvular Heart Disease       Patient Active Problem List    Diagnosis Date Noted    Thoracic aortic ectasia (HCC) 07/26/2022     Overview Note:     A. CT scan 2022: 4.2 cm  B. CT 2023: same      Aortic valve disease 09/29/2021     Overview Note:     A. Echo 2020:  Mild to moderate AI, trileaflet valve, normal LV size and EF   B.  TTE 12/2023: mild AI, normal EF       Hyperglycemia 07/21/2021    History of pulmonary embolism 07/21/2021    COPD (chronic obstructive pulmonary disease) ([facility])     Elevated PSA     Tobacco dependence 09/02/2020    Alcohol use 09/02/2020    Ulcerative colitis (HCC) 06/05/2019    Mixed hyperlipidemia 06/05/2019    Essential hypertension 12/05/2017    Benign prostatic hyperplasia with post-void dribbling 11/16/2016       Current Outpatient Medications   Medication Sig Dispense Refill    tamsulosin (FLOMAX) 0.4 MG capsule Take 2 capsules by mouth daily 180 capsule 1    dicyclomine (BENTYL) 10 MG capsule TAKE ONE CAPSULE BY MOUTH EVERY MORNING AND AT BEDTIME 220 capsule 1    atorvastatin (LIPITOR) 10 MG tablet Take 1 tablet by mouth daily 90 tablet 1    folic acid (FOLVITE) 1 MG tablet Take 1 tablet by mouth daily 90 tablet 1    lisinopril (PRINIVIL;ZESTRIL) 30 MG tablet Take 1 tablet by mouth daily 90 tablet 1    sertraline (ZOLOFT) 100 MG tablet Take 1 tablet by mouth daily 90 tablet 1    mercaptopurine (PURINETHOL) 50 MG chemo tablet Take 2.5 tablets by mouth daily 225 tablet 1    propranolol (INDERAL) 40 MG tablet Take 1 tablet by mouth 2 times daily 180 tablet 1    apixaban (ELIQUIS) 2.5 MG TABS tablet Take 1 tablet by mouth 2 times daily 180 tablet 1    LIALDA 1.2 g EC tablet Take 1 tablet by mouth 4 times daily 360 tablet 1     No current facility-administered medications for this visit.        No Known Allergies    Vitals:    10/15/24 0723   BP: 120/87   Pulse: (!) 49   Resp: 18   Weight: 98.3 kg (216 lb 12.8 oz)   Height: 1.702 m (5' 7\")

## 2024-10-16 ENCOUNTER — TREATMENT (OUTPATIENT)
Dept: PHYSICAL THERAPY | Age: 74
End: 2024-10-16
Payer: MEDICARE

## 2024-10-16 DIAGNOSIS — M54.50 LUMBAR BACK PAIN: Primary | ICD-10-CM

## 2024-10-16 PROCEDURE — 97530 THERAPEUTIC ACTIVITIES: CPT

## 2024-10-16 PROCEDURE — 97110 THERAPEUTIC EXERCISES: CPT

## 2024-10-16 NOTE — PROGRESS NOTES
[facility]   Phone: [phone]   Fax: [phone]      Physical Therapy Treatment Note    Date: 10/16/2024  Patient Name: Kody Diana  : 1950   MRN: [id number]  DOInjury: years  DOSx: NA  Referring Provider:  Deirdre Wilson MD  [address] [place] [address]          Medical Diagnosis: M54.50 (ICD-10-CM) - Lumbar back pain     Outcome Measure:  Oswestry 10%     S: Pt reports no pain today just stiffness.   O:  Time 3156-823     Visit 3/10  Repeat outcome measure at mid point and end.    Pain See above      ROM      Modalities      MH + ES            Exercise      ALL EXERCISE DONE WITH DRAW-IN TECHNIQUE                            Functional activities To aid in reaching , pushing, pulling tasks at home     ROWS: H  \"    ROWS: M 2 x 10  RTB    ROWS: L  \"    Cross counrty ski  2 x 10  RTB \"    Step ups  6 inch      THEREX     Bike 10  min      Punches      Lat pulldowns      Triceps ext standing      Marching      LAQ 2 x 10      LTR X 10 HEP    SLR 2 x 10     PPT X 20     Bridges 2 x 10  HEP    LAQ 2 x 10      Hip add X 20  Ball     Hip abd  RTB     Hip abduction standing 2 x 10 HEP    Hip extension standing 2 x 10      SB roll out fwd X 10 with 5s hold     Trunk ext TB      Trunk flex TB                                    A:  Tolerated well.  Pt reports no pain after session and also reports feeling \"loosened up\"     P: Continue with rehab plan  AMANDA CELIS, PTA  [id number]    Treatment Charges: Mins Units   Initial Evaluation     Re-Evaluation     Ther Exercise         TE 28 2   Manual Therapy     MT     Ther Activities        TA 10 1   Gait Training          GT     Neuro Re-education NR     Modalities     Non-Billable Service Time     Other     Total Time/Units 38 3

## 2024-10-28 ENCOUNTER — TREATMENT (OUTPATIENT)
Dept: PHYSICAL THERAPY | Age: 74
End: 2024-10-28
Payer: MEDICARE

## 2024-10-28 DIAGNOSIS — M54.50 LUMBAR BACK PAIN: Primary | ICD-10-CM

## 2024-10-28 PROCEDURE — 97530 THERAPEUTIC ACTIVITIES: CPT

## 2024-10-28 PROCEDURE — 97110 THERAPEUTIC EXERCISES: CPT

## 2024-10-28 NOTE — PROGRESS NOTES
[facility]   Phone: [phone]   Fax: [phone]      Physical Therapy Treatment Note    Date: 10/28/2024  Patient Name: Kody Diana  : 1950   MRN: [id number]  DOInjury: years  DOSx: NA  Referring Provider:  Deirdre Wilson MD  [address] [place] [address]          Medical Diagnosis: M54.50 (ICD-10-CM) - Lumbar back pain     Outcome Measure:  Oswestry 10%     S: Pt reports today that he doesn't believe PT is helping at all. He also states that neck pain has been more bothersome since doing PT for his back.   O:  Time 0842-920     Visit 5/10  Repeat outcome measure at mid point and end.    Pain See above      ROM      Modalities      MH + ES            Exercise      ALL EXERCISE DONE WITH DRAW-IN TECHNIQUE                            Functional activities To aid in reaching , pushing, pulling tasks at home     ROWS: H  \"    ROWS: M RTB    ROWS: L \"    Cross counrty ski  RTB \"    Step ups  6 inch      THEREX     Bike 10  min      Punches      Lat pulldowns      Triceps ext standing      Marching      LAQ 2 x 10      LTR X 10 HEP    SLR 2 x 10     PPT X 20     Bridges 2 x 10  HEP    LAQ 2 x 10      Hip add X 20  Ball     Hip abd  RTB     Hip abduction standing 2 x 10 HEP    Hip extension standing 2 x 10      SB roll out fwd X 10 with 5s hold     Trunk ext TB      Trunk flex TB                                    A:  Tolerated well. Pt reports no increase in pain or discomfort following session. Pt again states he is unsure of if he would like to continue with PT as he hasn't noticed improvement. Pt instructed to call us before next appt if he would like to self d/c. Also encouraged to continue HEP.     P: Continue with rehab plan  AMANDA CELIS, PTA  [id number]    Treatment Charges: Mins Units   Initial Evaluation     Re-Evaluation     Ther Exercise         TE 28 2   Manual Therapy     MT     Ther Activities        TA 10 1   Gait Training          GT

## 2024-10-30 ENCOUNTER — TREATMENT (OUTPATIENT)
Dept: PHYSICAL THERAPY | Age: 74
End: 2024-10-30
Payer: MEDICARE

## 2024-10-30 DIAGNOSIS — M54.50 LUMBAR BACK PAIN: Primary | ICD-10-CM

## 2024-10-30 PROCEDURE — 97110 THERAPEUTIC EXERCISES: CPT

## 2024-10-30 PROCEDURE — 97530 THERAPEUTIC ACTIVITIES: CPT

## 2024-10-30 NOTE — PROGRESS NOTES
[facility]   Phone: [phone]   Fax: [phone]      Physical Therapy Treatment Note    Date: 10/30/2024  Patient Name: Kody Diana  : 1950   MRN: [id number]  DOInjury: years  DOSx: NA  Referring Provider:  Deirdre Wilson MD  [address] [place] [address]          Medical Diagnosis: M54.50 (ICD-10-CM) - Lumbar back pain     Outcome Measure:  Oswestry 10%     S: Pt reports today that he felt better after last session. No back pain and neck pain is 2/10.     O:  Time 0800-838     Visit 6/10  Repeat outcome measure at mid point and end.    Pain See above      ROM      Modalities      MH + ES            Exercise      ALL EXERCISE DONE WITH DRAW-IN TECHNIQUE                            Functional activities To aid in reaching , pushing, pulling tasks at home     ROWS: H  \"    ROWS: M RTB    ROWS: L \"    Cross counrty ski  RTB \"    Step ups  6 inch      THEREX     Bike 10  min      Punches      Lat pulldowns      Triceps ext standing      Marching      LAQ 2 x 10      LTR X 10 HEP    SLR 2 x 10     PPT X 20     Bridges 2 x 10  HEP    LAQ 2 x 10      Hip add X 20  Ball     Hip abd  RTB     Hip abduction standing 2 x 10 HEP    Hip extension standing 2 x 10      SB roll out fwd X 10 with 5s hold     Trunk ext TB      Trunk flex TB                                    A:  Tolerated well. Pt reports no increase in pain following treatment session today. Continue with current HEP.     P: Continue with rehab plan  AMANDA CELIS, PTA  [id number]    Treatment Charges: Mins Units   Initial Evaluation     Re-Evaluation     Ther Exercise         TE 28 2   Manual Therapy     MT     Ther Activities        TA 10 1   Gait Training          GT     Neuro Re-education NR     Modalities     Non-Billable Service Time     Other     Total Time/Units 38 3

## 2024-11-02 ENCOUNTER — TELEPHONE (OUTPATIENT)
Dept: FAMILY MEDICINE CLINIC | Age: 74
End: 2024-11-02

## 2024-11-02 NOTE — TELEPHONE ENCOUNTER
Pt dropped off PAP forms from [employer] for Lialda medication.  Doctor has completed his part.  Pt needs to sign his part.  Placed at  for .  Pt advised.  Forms scanned into chart.

## 2024-11-04 ENCOUNTER — TREATMENT (OUTPATIENT)
Dept: PHYSICAL THERAPY | Age: 74
End: 2024-11-04
Payer: MEDICARE

## 2024-11-04 DIAGNOSIS — M54.50 LUMBAR BACK PAIN: Primary | ICD-10-CM

## 2024-11-04 PROCEDURE — 97110 THERAPEUTIC EXERCISES: CPT | Performed by: PHYSICAL THERAPIST

## 2024-11-04 NOTE — PROGRESS NOTES
[facility]   Phone: [phone]   Fax: [phone]      Physical Therapy Treatment Note    Date: 2024  Patient Name: Kody Diana  : 1950   MRN: [id number]  DOInjury: years  DOSx: NA  Referring Provider:  Deirdre Wilson MD  [address] [place] [address]          Medical Diagnosis: M54.50 (ICD-10-CM) - Lumbar back pain     Outcome Measure:  Oswestry 10%     S: Pt reports pain 2/10 today.     O:  Time 921     Visit 7 /10  Repeat outcome measure at mid point and end.    Pain See above      ROM      Modalities      MH + ES            Exercise      ALL EXERCISE DONE WITH DRAW-IN TECHNIQUE                            Functional activities To aid in reaching , pushing, pulling tasks at home     ROWS: H  \"    ROWS: M RTB    ROWS: L \"    Cross counrty ski  RTB \"    Step ups  6 inch      THEREX     Bike 10  min      Punches      Lat pulldowns      Triceps ext standing      Marching      LAQ 2 x 10      LTR X 10 HEP    SLR 2 x 10     PPT X 20     Bridges 2 x 10  HEP    LAQ 2 x 10      Hip add X 20  Ball     Hip abd  2 x 10 GTB     Hip abduction standing 2 x 10 HEP    Hip extension standing 2 x 10      SB roll out fwd X 10 with 5s hold     Trunk ext TB      Trunk flex TB                                    A:  Tolerated well. No complaints end of session.  Pt cancelling next appointment.  Pt did not want to reschedule at this time.  Advised pt to call in the next week to let PT know if pt is rescheduling or self discharging.  Pt demonstrates understanding.      P: Continue with rehab plan  Griselda Lua, PT  [id number]    Treatment Charges: Mins Units   Initial Evaluation     Re-Evaluation     Ther Exercise         TE 39 3   Manual Therapy     MT     Ther Activities        TA     Gait Training          GT     Neuro Re-education NR     Modalities     Non-Billable Service Time     Other     Total Time/Units 39 3

## 2024-11-06 ENCOUNTER — TREATMENT (OUTPATIENT)
Dept: PHYSICAL THERAPY | Age: 74
End: 2024-11-06

## 2024-11-11 NOTE — PROGRESS NOTES
[facility]   Phone: [phone]   Fax: [phone]      Discharge Summary        REASON FOR DISCHARGE: pt called in to self discharge.         RECOMMENDATIONS: call c questions or if additional services are warranted.      Thank you for the opportunity to work with your patient. If you have questions or comments, please contact me at numbers listed above.      Griselda Lua, PT PT , DPT PT [id number] 11/11/2024

## 2024-11-14 DIAGNOSIS — R74.01 ELEVATED ALT MEASUREMENT: ICD-10-CM

## 2024-11-14 LAB
ALBUMIN: 3.9 G/DL (ref 3.5–5.2)
ALP BLD-CCNC: 58 U/L (ref 40–129)
ALT SERPL-CCNC: <5 U/L (ref 0–40)
ANION GAP SERPL CALCULATED.3IONS-SCNC: 10 MMOL/L (ref 7–16)
AST SERPL-CCNC: 31 U/L (ref 0–39)
BILIRUB SERPL-MCNC: 0.6 MG/DL (ref 0–1.2)
BUN BLDV-MCNC: 18 MG/DL (ref 6–23)
CALCIUM SERPL-MCNC: 8.9 MG/DL (ref 8.6–10.2)
CHLORIDE BLD-SCNC: 105 MMOL/L (ref 98–107)
CO2: 24 MMOL/L (ref 22–29)
CREAT SERPL-MCNC: 1 MG/DL (ref 0.7–1.2)
GFR, ESTIMATED: 84 ML/MIN/1.73M2
GLUCOSE BLD-MCNC: 101 MG/DL (ref 74–99)
POTASSIUM SERPL-SCNC: 4.9 MMOL/L (ref 3.5–5)
SODIUM BLD-SCNC: 139 MMOL/L (ref 132–146)
TOTAL PROTEIN: 6.9 G/DL (ref 6.4–8.3)

## 2024-11-21 DIAGNOSIS — R74.8 ELEVATED LIVER ENZYMES: Primary | ICD-10-CM

## 2024-12-04 DIAGNOSIS — R74.8 ELEVATED LIVER ENZYMES: ICD-10-CM

## 2024-12-05 LAB
ALBUMIN: 4 G/DL (ref 3.5–5.2)
ALP BLD-CCNC: 51 U/L (ref 40–129)
ALT SERPL-CCNC: 46 U/L (ref 0–40)
ANION GAP SERPL CALCULATED.3IONS-SCNC: 14 MMOL/L (ref 7–16)
AST SERPL-CCNC: 33 U/L (ref 0–39)
BILIRUB SERPL-MCNC: 0.8 MG/DL (ref 0–1.2)
BUN BLDV-MCNC: 20 MG/DL (ref 6–23)
CALCIUM SERPL-MCNC: 9.4 MG/DL (ref 8.6–10.2)
CHLORIDE BLD-SCNC: 102 MMOL/L (ref 98–107)
CHOLESTEROL, TOTAL: 174 MG/DL
CO2: 24 MMOL/L (ref 22–29)
CREAT SERPL-MCNC: 1 MG/DL (ref 0.7–1.2)
GFR, ESTIMATED: 79 ML/MIN/1.73M2
GLUCOSE BLD-MCNC: 99 MG/DL (ref 74–99)
HDLC SERPL-MCNC: 40 MG/DL
LDL CHOLESTEROL: 113 MG/DL
POTASSIUM SERPL-SCNC: 4.7 MMOL/L (ref 3.5–5)
SODIUM BLD-SCNC: 140 MMOL/L (ref 132–146)
TOTAL PROTEIN: 7.3 G/DL (ref 6.4–8.3)
TRIGL SERPL-MCNC: 105 MG/DL
VLDLC SERPL CALC-MCNC: 21 MG/DL

## 2025-02-03 ENCOUNTER — OFFICE VISIT (OUTPATIENT)
Dept: FAMILY MEDICINE CLINIC | Age: 75
End: 2025-02-03

## 2025-02-03 VITALS
DIASTOLIC BLOOD PRESSURE: 78 MMHG | WEIGHT: 215 LBS | OXYGEN SATURATION: 97 % | SYSTOLIC BLOOD PRESSURE: 130 MMHG | HEART RATE: 63 BPM | TEMPERATURE: 97.8 F | HEIGHT: 67 IN | BODY MASS INDEX: 33.74 KG/M2

## 2025-02-03 DIAGNOSIS — K51.80 OTHER ULCERATIVE COLITIS WITHOUT COMPLICATION (HCC): ICD-10-CM

## 2025-02-03 DIAGNOSIS — F41.9 ANXIETY: ICD-10-CM

## 2025-02-03 DIAGNOSIS — M54.2 CHRONIC NECK PAIN: Primary | ICD-10-CM

## 2025-02-03 DIAGNOSIS — M54.2 CHRONIC NECK PAIN: ICD-10-CM

## 2025-02-03 DIAGNOSIS — G25.0 ESSENTIAL TREMOR: ICD-10-CM

## 2025-02-03 DIAGNOSIS — E78.2 MIXED HYPERLIPIDEMIA: ICD-10-CM

## 2025-02-03 DIAGNOSIS — J44.9 CHRONIC OBSTRUCTIVE PULMONARY DISEASE, UNSPECIFIED COPD TYPE (HCC): ICD-10-CM

## 2025-02-03 DIAGNOSIS — G89.29 CHRONIC NECK PAIN: Primary | ICD-10-CM

## 2025-02-03 DIAGNOSIS — G89.29 CHRONIC NECK PAIN: ICD-10-CM

## 2025-02-03 DIAGNOSIS — I10 ESSENTIAL HYPERTENSION: ICD-10-CM

## 2025-02-03 RX ORDER — ATORVASTATIN CALCIUM 10 MG/1
10 TABLET, FILM COATED ORAL DAILY
Qty: 90 TABLET | Refills: 1 | Status: SHIPPED | OUTPATIENT
Start: 2025-02-03

## 2025-02-03 RX ORDER — FOLIC ACID 1 MG/1
1000 TABLET ORAL DAILY
Qty: 90 TABLET | Refills: 1 | Status: SHIPPED | OUTPATIENT
Start: 2025-02-03

## 2025-02-03 RX ORDER — LISINOPRIL 30 MG/1
30 TABLET ORAL DAILY
Qty: 90 TABLET | Refills: 1 | Status: SHIPPED | OUTPATIENT
Start: 2025-02-03

## 2025-02-03 RX ORDER — PROPRANOLOL HYDROCHLORIDE 40 MG/1
40 TABLET ORAL 2 TIMES DAILY
Qty: 180 TABLET | Refills: 1 | Status: SHIPPED | OUTPATIENT
Start: 2025-02-03

## 2025-02-03 RX ORDER — TAMSULOSIN HYDROCHLORIDE 0.4 MG/1
0.8 CAPSULE ORAL DAILY
Qty: 180 CAPSULE | Refills: 1 | Status: SHIPPED | OUTPATIENT
Start: 2025-02-03

## 2025-02-03 RX ORDER — SERTRALINE HYDROCHLORIDE 100 MG/1
100 TABLET, FILM COATED ORAL DAILY
Qty: 90 TABLET | Refills: 1 | Status: SHIPPED | OUTPATIENT
Start: 2025-02-03

## 2025-02-03 RX ORDER — DICYCLOMINE HYDROCHLORIDE 10 MG/1
10 CAPSULE ORAL 2 TIMES DAILY
Qty: 220 CAPSULE | Refills: 1 | Status: SHIPPED | OUTPATIENT
Start: 2025-02-03

## 2025-02-03 RX ORDER — MERCAPTOPURINE 50 MG/1
125 TABLET ORAL DAILY
Qty: 225 TABLET | Refills: 1 | Status: SHIPPED | OUTPATIENT
Start: 2025-02-03

## 2025-02-03 ASSESSMENT — PATIENT HEALTH QUESTIONNAIRE - PHQ9
1. LITTLE INTEREST OR PLEASURE IN DOING THINGS: NOT AT ALL
SUM OF ALL RESPONSES TO PHQ QUESTIONS 1-9: 0
SUM OF ALL RESPONSES TO PHQ9 QUESTIONS 1 & 2: 0
2. FEELING DOWN, DEPRESSED OR HOPELESS: NOT AT ALL

## 2025-02-03 NOTE — PROGRESS NOTES
[facility]  Office Progress Note - Dr. Acosta  2/3/25    CC:   Chief Complaint   Patient presents with    Hypertension        /78   Pulse 63   Temp 97.8 °F (36.6 °C) (Temporal)   Ht 1.702 m (5' 7\")   Wt 97.5 kg (215 lb)   SpO2 97%   BMI 33.67 kg/m²   Wt Readings from Last 3 Encounters:   02/03/25 97.5 kg (215 lb)   10/15/24 98.3 kg (216 lb 12.8 oz)   07/30/24 98.4 kg (217 lb)       HPI:   JOSIAH generated note:  History of Present Illness  The patient presents for evaluation of neck pain, ulcerative colitis, and health maintenance.    He reports persistent discomfort in his neck, which is exacerbated by movement. He has previously attempted physical therapy but did not perceive significant improvement. The pain is described as a tightness that extends from the neck to the shoulders, with occasional spasms. He speculates that this may be related to arthritis, given his history of lower back arthritis. The pain is bilateral, although more pronounced on one side, and does not radiate down the arms. He also experiences mild hip pain. He reports no difficulty in arm mobility or reaching overhead. He has been receiving weekly massage therapy for his neck, back, and hips, which he finds beneficial. He acknowledges a tendency towards tension and stress, which he attributes to familial habits. He does not believe he grinds his teeth, as confirmed by his dentist, but notes occasional morning jaw discomfort. He expresses interest in exploring further treatment options for his arthritis. The pain is not associated with physical activity and appears to be random.    He has been diagnosed with ulcerative colitis, which is currently well-managed. He reports that his gastrointestinal symptoms are the best they have been in years, possibly due to an increase in his mercaptopurine dosage. His last colonoscopy was the most successful in recent years. He is currently on a regimen of mercaptopurine and

## 2025-02-04 LAB — SED RATE, AUTOMATED: 19 MM/HR (ref 0–15)

## 2025-04-15 ENCOUNTER — TELEPHONE (OUTPATIENT)
Dept: FAMILY MEDICINE CLINIC | Age: 75
End: 2025-04-15

## 2025-05-20 ENCOUNTER — TELEPHONE (OUTPATIENT)
Dept: FAMILY MEDICINE CLINIC | Age: 75
End: 2025-05-20

## 2025-05-20 NOTE — TELEPHONE ENCOUNTER
Yes.   Basically he needs a Rx and I have to send it to a pharmacy that the drug company runs for this program.  The medication comes in a vial that he has to draw up and administer to his abdomen once a week like an insulin shot.   And because he needs a Rx, he needs to have an appointment with me to make sure the medicine is safe for him and so I can  him on side effects.

## 2025-05-20 NOTE — TELEPHONE ENCOUNTER
Kody calling about the different weight loss medications available, stating that he saw an advertisement on TV about it costing about $500 out of pocket.     He stated that he would pay that for one of them.  What is your advice on this?

## 2025-06-16 ENCOUNTER — OFFICE VISIT (OUTPATIENT)
Dept: FAMILY MEDICINE CLINIC | Age: 75
End: 2025-06-16
Payer: MEDICARE

## 2025-06-16 VITALS
HEIGHT: 68 IN | OXYGEN SATURATION: 95 % | HEART RATE: 61 BPM | BODY MASS INDEX: 32.74 KG/M2 | WEIGHT: 216 LBS | TEMPERATURE: 98.4 F | DIASTOLIC BLOOD PRESSURE: 66 MMHG | SYSTOLIC BLOOD PRESSURE: 126 MMHG

## 2025-06-16 DIAGNOSIS — M79.671 BILATERAL FOOT PAIN: Primary | ICD-10-CM

## 2025-06-16 DIAGNOSIS — Z86.711 HISTORY OF PULMONARY EMBOLISM: ICD-10-CM

## 2025-06-16 DIAGNOSIS — E66.811 CLASS 1 OBESITY DUE TO EXCESS CALORIES WITHOUT SERIOUS COMORBIDITY WITH BODY MASS INDEX (BMI) OF 32.0 TO 32.9 IN ADULT: ICD-10-CM

## 2025-06-16 DIAGNOSIS — L57.0 ACTINIC KERATOSES: ICD-10-CM

## 2025-06-16 DIAGNOSIS — M79.672 BILATERAL FOOT PAIN: Primary | ICD-10-CM

## 2025-06-16 DIAGNOSIS — E66.09 CLASS 1 OBESITY DUE TO EXCESS CALORIES WITHOUT SERIOUS COMORBIDITY WITH BODY MASS INDEX (BMI) OF 32.0 TO 32.9 IN ADULT: ICD-10-CM

## 2025-06-16 DIAGNOSIS — R97.20 ELEVATED PSA: ICD-10-CM

## 2025-06-16 PROCEDURE — 1159F MED LIST DOCD IN RCRD: CPT | Performed by: FAMILY MEDICINE

## 2025-06-16 PROCEDURE — G8427 DOCREV CUR MEDS BY ELIG CLIN: HCPCS | Performed by: FAMILY MEDICINE

## 2025-06-16 PROCEDURE — 3078F DIAST BP <80 MM HG: CPT | Performed by: FAMILY MEDICINE

## 2025-06-16 PROCEDURE — 3074F SYST BP LT 130 MM HG: CPT | Performed by: FAMILY MEDICINE

## 2025-06-16 PROCEDURE — 99214 OFFICE O/P EST MOD 30 MIN: CPT | Performed by: FAMILY MEDICINE

## 2025-06-16 PROCEDURE — 3017F COLORECTAL CA SCREEN DOC REV: CPT | Performed by: FAMILY MEDICINE

## 2025-06-16 PROCEDURE — G8417 CALC BMI ABV UP PARAM F/U: HCPCS | Performed by: FAMILY MEDICINE

## 2025-06-16 PROCEDURE — 4004F PT TOBACCO SCREEN RCVD TLK: CPT | Performed by: FAMILY MEDICINE

## 2025-06-16 PROCEDURE — 1123F ACP DISCUSS/DSCN MKR DOCD: CPT | Performed by: FAMILY MEDICINE

## 2025-06-16 NOTE — PROGRESS NOTES
[facility] am [facility]  Office Progress Note - Dr. Acosta  6/16/25    CC:   Chief Complaint   Patient presents with    Weight Management     Discuss medication options for weight loss.    Foot Pain     BL foot pain - toes and heels after walking.        /66   Pulse 61   Temp 98.4 °F (36.9 °C) (Temporal)   Ht 1.727 m (5' 8\")   Wt 98 kg (216 lb)   SpO2 95%   BMI 32.84 kg/m²   Wt Readings from Last 3 Encounters:   06/16/25 98 kg (216 lb)   02/03/25 97.5 kg (215 lb)   10/15/24 98.3 kg (216 lb 12.8 oz)       HPI:   JOSIAH generated note:  History of Present Illness  The patient presents for evaluation of foot pain, blood clot Hx, elevated PSA, actinic keratosis, and weight management.    He has been experiencing persistent foot pain for approximately 8 to 10 years, which he describes as distinct from neuropathy. The pain is particularly pronounced when walking, affecting the joints and base of the toes. No claudication. Mobility is limited, with a maximum walking distance of 1.5 miles before the onset of limping due to pain in the. Despite the discomfort, he continues to walk daily, although with some limitations. No pharmacological intervention has been sought for this condition. A history of callus formation on the feet has been managed with topical treatments and filing.    He is currently on a prophylactic dose of Eliquis (2.5 mg twice daily) following a previous episode of blood clotting three years ago. Concern is expressed about the adequacy of this dosage and the potential for recurrence of the clotting event. The initial clotting episode was asymptomatic, but pain was experienced upon waking one morning, followed by difficulty breathing. He was appropriately treated and has not had any recently concerning symptoms.    Recent PSA levels have shown an increase. He has been diagnosed with an enlarged prostate.  He has follow-up with his urologist soon.  The velocity has been linear

## 2025-06-17 ENCOUNTER — TELEPHONE (OUTPATIENT)
Dept: FAMILY MEDICINE CLINIC | Age: 75
End: 2025-06-17

## 2025-06-17 DIAGNOSIS — E66.811 CLASS 1 OBESITY DUE TO EXCESS CALORIES WITHOUT SERIOUS COMORBIDITY WITH BODY MASS INDEX (BMI) OF 32.0 TO 32.9 IN ADULT: ICD-10-CM

## 2025-06-17 DIAGNOSIS — E66.09 CLASS 1 OBESITY DUE TO EXCESS CALORIES WITHOUT SERIOUS COMORBIDITY WITH BODY MASS INDEX (BMI) OF 32.0 TO 32.9 IN ADULT: ICD-10-CM

## 2025-06-17 RX ORDER — TIRZEPATIDE 2.5 MG/.5ML
2.5 INJECTION, SOLUTION SUBCUTANEOUS WEEKLY
Qty: 2 ML | Refills: 0 | Status: CANCELLED | OUTPATIENT
Start: 2025-06-17

## 2025-06-17 NOTE — TELEPHONE ENCOUNTER
Received fax from [employer]              Your original rx did say to dispense 2 mL (as they are requesting)  I updated rx with a note to pharmacy specifically stating \"dispense 2mLs (one box of four single-dose 0.5mL VIALS)\" [Exactly as they have listed above]    Full document from [employer] scanned into chart.  Med with note to pharmacy pended.

## 2025-06-17 NOTE — TELEPHONE ENCOUNTER
I sent a Zepbound weight loss injection VIAL to [facility].    I dont know what happens next.   Here is the contact info for that pharmacy IF they do not contact him within the next week.   [facility] - [place], [place] - [address] [phone] - F [phone]     The first starter month injection is ~$349,  the second month is a stronger dose (I'll need to order it in a few weeks if he wants to continue -please let me know.   And it is ~$499.     Because this is a vial, and an expensive medication, if he is unable to draw it up and administer it due to his tremor, please let us know and we could give it here in the office.     First things first, get the medication and then we'll figure things out from there.

## 2025-06-17 NOTE — TELEPHONE ENCOUNTER
Patient returned call to office. Updated him on correcting script sent to [facility]. Once completed, he should receive medication at his home. If he can not give injection his self to call office and we will schedule nurse visit.   He says to call him if we need anything from him

## 2025-06-17 NOTE — TELEPHONE ENCOUNTER
Yeah, the first prescription I sent was wrong.  I did send 0.5 mL.  Then I realized he would need 4 vials for a month so I changed it to 2 mL.  That is probably in response to my first prescription that I sent.

## 2025-06-20 ENCOUNTER — RESULTS FOLLOW-UP (OUTPATIENT)
Dept: FAMILY MEDICINE CLINIC | Age: 75
End: 2025-06-20

## 2025-06-20 DIAGNOSIS — M79.671 BILATERAL FOOT PAIN: Primary | ICD-10-CM

## 2025-06-20 DIAGNOSIS — M79.672 BILATERAL FOOT PAIN: Primary | ICD-10-CM

## 2025-07-02 ENCOUNTER — OFFICE VISIT (OUTPATIENT)
Dept: PODIATRY | Age: 75
End: 2025-07-02
Payer: MEDICARE

## 2025-07-02 VITALS
DIASTOLIC BLOOD PRESSURE: 75 MMHG | WEIGHT: 216 LBS | BODY MASS INDEX: 32.84 KG/M2 | TEMPERATURE: 97 F | SYSTOLIC BLOOD PRESSURE: 130 MMHG

## 2025-07-02 DIAGNOSIS — M79.674 PAIN IN RIGHT TOE(S): ICD-10-CM

## 2025-07-02 DIAGNOSIS — M20.22 HALLUX RIGIDUS OF BOTH FEET: Primary | ICD-10-CM

## 2025-07-02 DIAGNOSIS — M20.21 HALLUX RIGIDUS OF BOTH FEET: Primary | ICD-10-CM

## 2025-07-02 DIAGNOSIS — M79.675 PAIN IN LEFT TOE(S): ICD-10-CM

## 2025-07-02 PROCEDURE — 4004F PT TOBACCO SCREEN RCVD TLK: CPT | Performed by: PODIATRIST

## 2025-07-02 PROCEDURE — 99203 OFFICE O/P NEW LOW 30 MIN: CPT | Performed by: PODIATRIST

## 2025-07-02 PROCEDURE — 20600 DRAIN/INJ JOINT/BURSA W/O US: CPT | Performed by: PODIATRIST

## 2025-07-02 PROCEDURE — 1123F ACP DISCUSS/DSCN MKR DOCD: CPT | Performed by: PODIATRIST

## 2025-07-02 PROCEDURE — G8427 DOCREV CUR MEDS BY ELIG CLIN: HCPCS | Performed by: PODIATRIST

## 2025-07-02 PROCEDURE — G8417 CALC BMI ABV UP PARAM F/U: HCPCS | Performed by: PODIATRIST

## 2025-07-02 PROCEDURE — 3075F SYST BP GE 130 - 139MM HG: CPT | Performed by: PODIATRIST

## 2025-07-02 PROCEDURE — 3017F COLORECTAL CA SCREEN DOC REV: CPT | Performed by: PODIATRIST

## 2025-07-02 PROCEDURE — 3078F DIAST BP <80 MM HG: CPT | Performed by: PODIATRIST

## 2025-07-02 PROCEDURE — 1159F MED LIST DOCD IN RCRD: CPT | Performed by: PODIATRIST

## 2025-07-02 RX ORDER — BUPIVACAINE HYDROCHLORIDE 5 MG/ML
1 INJECTION, SOLUTION PERINEURAL ONCE
Status: COMPLETED | OUTPATIENT
Start: 2025-07-02 | End: 2025-07-02

## 2025-07-02 RX ORDER — METHYLPREDNISOLONE ACETATE 40 MG/ML
40 INJECTION, SUSPENSION INTRA-ARTICULAR; INTRALESIONAL; INTRAMUSCULAR; SOFT TISSUE ONCE
Status: COMPLETED | OUTPATIENT
Start: 2025-07-02 | End: 2025-07-02

## 2025-07-02 RX ADMIN — METHYLPREDNISOLONE ACETATE 1 MG: 40 INJECTION, SUSPENSION INTRA-ARTICULAR; INTRALESIONAL; INTRAMUSCULAR; SOFT TISSUE at 10:44

## 2025-07-02 RX ADMIN — BUPIVACAINE HYDROCHLORIDE 1 MG: 5 INJECTION, SOLUTION PERINEURAL at 10:43

## 2025-07-02 RX ADMIN — METHYLPREDNISOLONE ACETATE 1 MG: 40 INJECTION, SUSPENSION INTRA-ARTICULAR; INTRALESIONAL; INTRAMUSCULAR; SOFT TISSUE at 10:45

## 2025-07-02 NOTE — PROGRESS NOTES
Rfl: 1    lisinopril (PRINIVIL;ZESTRIL) 30 MG tablet, Take 1 tablet by mouth daily, Disp: 90 tablet, Rfl: 1    mercaptopurine (PURINETHOL) 50 MG chemo tablet, Take 2.5 tablets by mouth daily, Disp: 225 tablet, Rfl: 1    propranolol (INDERAL) 40 MG tablet, Take 1 tablet by mouth 2 times daily, Disp: 180 tablet, Rfl: 1    sertraline (ZOLOFT) 100 MG tablet, Take 1 tablet by mouth daily, Disp: 90 tablet, Rfl: 1    tamsulosin (FLOMAX) 0.4 MG capsule, Take 2 capsules by mouth daily, Disp: 180 capsule, Rfl: 1    apixaban (ELIQUIS) 2.5 MG TABS tablet, Take 1 tablet by mouth 2 times daily, Disp: 180 tablet, Rfl: 1    LIALDA 1.2 g EC tablet, Take 1 tablet by mouth 4 times daily, Disp: 360 tablet, Rfl: 1  No Known Allergies    Past Medical History:   Diagnosis Date    Anxiety 2019    COPD (chronic obstructive pulmonary disease) ([facility])     Essential hypertension 2017    Hx of blood clots 2020    PEs    Lung nodule 2020    Mixed hyperlipidemia 2019    SOB (shortness of breath)     Ulcerative colitis (HCC) 2019     Past Surgical History:   Procedure Laterality Date    COLONOSCOPY      DENTAL SURGERY      extraction    PULMONARY EMBOLISM LYSIS  2020    Dr Velasquez     Family History   Problem Relation Age of Onset    COPD Mother      Social History     Socioeconomic History    Marital status: Single     Spouse name: Not on file    Number of children: Not on file    Years of education: Not on file    Highest education level: Not on file   Occupational History    Not on file   Tobacco Use    Smoking status: Some Days     Current packs/day: 0.00     Average packs/day: 1 pack/day for 30.0 years (30.0 ttl pk-yrs)     Types: Cigars, Cigarettes     Start date: 9/3/1969     Last attempt to quit: 1975     Years since quittin.5    Smokeless tobacco: Never    Tobacco comments:     smoked about 1 ppd cigarettes for about 10-15 yrs, cigars about 5/week for past 20+ yrs   Vaping Use    Vaping status: Never

## 2025-07-14 DIAGNOSIS — Z79.01 CHRONIC ANTICOAGULATION: ICD-10-CM

## 2025-07-14 DIAGNOSIS — Z86.711 HISTORY OF PULMONARY EMBOLISM: ICD-10-CM

## 2025-07-14 NOTE — TELEPHONE ENCOUNTER
Name of Medication(s) Requested:  Requested Prescriptions     Pending Prescriptions Disp Refills    apixaban (ELIQUIS) 2.5 MG TABS tablet 180 tablet 1     Sig: Take 1 tablet by mouth 2 times daily       Medication is on current medication list Yes    Dosage and directions were verified? Yes    Quantity verified: 90 day supply     Pharmacy Verified?  Yes    Last Appointment:  6/16/2025    Future appts:  Future Appointments   Date Time Provider Department Center   8/4/2025  8:20 AM [facility]erg [facility]osta [facility]   8/20/2025 10:15 AM Galileo Osorio DPM [facility]   10/16/2025  7:30 AM Keven Reddy MD [facility]        (If no appt send self scheduling link. .REFILLAPPT)  Scheduling request sent?     [] Yes  [x] No    Does patient need updated?  [] Yes  [x] No

## 2025-07-15 DIAGNOSIS — E66.09 CLASS 1 OBESITY DUE TO EXCESS CALORIES WITHOUT SERIOUS COMORBIDITY WITH BODY MASS INDEX (BMI) OF 32.0 TO 32.9 IN ADULT: ICD-10-CM

## 2025-07-15 DIAGNOSIS — E66.811 CLASS 1 OBESITY DUE TO EXCESS CALORIES WITHOUT SERIOUS COMORBIDITY WITH BODY MASS INDEX (BMI) OF 32.0 TO 32.9 IN ADULT: ICD-10-CM

## 2025-07-15 NOTE — TELEPHONE ENCOUNTER
PatientName of Medication(s) Requested:  Requested Prescriptions     Pending Prescriptions Disp Refills    tirzepatide-weight management (ZEPBOUND) 2.5 MG/0.5ML SOLN subCUTAneous injection (VIAL) 2 mL 0     Sig: Inject 0.5 mLs into the skin once a week       Medication is on current medication list Yes    Dosage and directions were verified? Yes    Quantity verified: 30 day supply     Pharmacy Verified?  Yes    Last Appointment:  6/16/2025    Future appts:  Future Appointments   Date Time Provider Department Center   8/4/2025  8:20 AM Serg Ac[facility]   8/20/2025 10:15 AM Galileo Osorio DPM Col Podiatry [facility]   10/16/2025  7:30 AM Keven Reddy MD [facility]        (If no appt send self scheduling link. .REFILLAPPT)  Scheduling request sent?     [] Yes  [x] No    Does patient need updated?  [] Yes  [x] No requesting refill- wanting to go up to next dosage

## 2025-07-15 NOTE — TELEPHONE ENCOUNTER
Did patient start zepbound and has he been doing ok with it?   Does he want to increase the strength?

## 2025-07-16 NOTE — TELEPHONE ENCOUNTER
Called, left message for patient to please return call Dr Acosta has some questions. Phone Hours and phone number left.

## 2025-07-17 NOTE — TELEPHONE ENCOUNTER
Patient returned call, he reports he has started Zepbound, doing well with it. Agreeable with increase in dose.

## 2025-08-04 ENCOUNTER — OFFICE VISIT (OUTPATIENT)
Dept: FAMILY MEDICINE CLINIC | Age: 75
End: 2025-08-04

## 2025-08-04 VITALS
HEIGHT: 68 IN | TEMPERATURE: 97.7 F | OXYGEN SATURATION: 98 % | BODY MASS INDEX: 30.01 KG/M2 | SYSTOLIC BLOOD PRESSURE: 110 MMHG | DIASTOLIC BLOOD PRESSURE: 66 MMHG | WEIGHT: 198 LBS | HEART RATE: 60 BPM

## 2025-08-04 DIAGNOSIS — R73.03 PREDIABETES: ICD-10-CM

## 2025-08-04 DIAGNOSIS — G25.0 ESSENTIAL TREMOR: ICD-10-CM

## 2025-08-04 DIAGNOSIS — F41.9 ANXIETY: ICD-10-CM

## 2025-08-04 DIAGNOSIS — E78.2 MIXED HYPERLIPIDEMIA: ICD-10-CM

## 2025-08-04 DIAGNOSIS — I10 ESSENTIAL HYPERTENSION: ICD-10-CM

## 2025-08-04 DIAGNOSIS — K51.80 OTHER ULCERATIVE COLITIS WITHOUT COMPLICATION (HCC): ICD-10-CM

## 2025-08-04 DIAGNOSIS — Z00.00 MEDICARE ANNUAL WELLNESS VISIT, SUBSEQUENT: Primary | ICD-10-CM

## 2025-08-04 LAB
ALBUMIN: 3.7 G/DL (ref 3.5–5.2)
ALP BLD-CCNC: 70 U/L (ref 40–129)
ALT SERPL-CCNC: 112 U/L (ref 0–50)
ANION GAP SERPL CALCULATED.3IONS-SCNC: 12 MMOL/L (ref 7–16)
AST SERPL-CCNC: 69 U/L (ref 0–50)
BASOPHILS ABSOLUTE: 0.02 K/UL (ref 0–0.2)
BASOPHILS RELATIVE PERCENT: 1 % (ref 0–2)
BILIRUB SERPL-MCNC: 1.2 MG/DL (ref 0–1.2)
BUN BLDV-MCNC: 24 MG/DL (ref 8–23)
CALCIUM SERPL-MCNC: 9.5 MG/DL (ref 8.8–10.2)
CHLORIDE BLD-SCNC: 105 MMOL/L (ref 98–107)
CHOLESTEROL, TOTAL: 113 MG/DL
CO2: 22 MMOL/L (ref 22–29)
CREAT SERPL-MCNC: 1 MG/DL (ref 0.7–1.2)
EOSINOPHILS ABSOLUTE: 0.06 K/UL (ref 0.05–0.5)
EOSINOPHILS RELATIVE PERCENT: 3 % (ref 0–6)
GFR, ESTIMATED: 79 ML/MIN/1.73M2
GLUCOSE BLD-MCNC: 97 MG/DL (ref 74–99)
HBA1C MFR BLD: 5.9 % (ref 4–5.6)
HCT VFR BLD CALC: 36.9 % (ref 37–54)
HDLC SERPL-MCNC: 40 MG/DL
HEMOGLOBIN: 11.9 G/DL (ref 12.5–16.5)
LDL CHOLESTEROL: 56 MG/DL
LYMPHOCYTES ABSOLUTE: 0.65 K/UL (ref 1.5–4)
LYMPHOCYTES RELATIVE PERCENT: 27 % (ref 20–42)
MCH RBC QN AUTO: 33 PG (ref 26–35)
MCHC RBC AUTO-ENTMCNC: 32.2 G/DL (ref 32–34.5)
MCV RBC AUTO: 102.2 FL (ref 80–99.9)
MONOCYTES ABSOLUTE: 0 K/UL (ref 0.1–0.95)
MONOCYTES RELATIVE PERCENT: 0 % (ref 2–12)
NEUTROPHILS ABSOLUTE: 1.67 K/UL (ref 1.8–7.3)
NEUTROPHILS RELATIVE PERCENT: 70 % (ref 43–80)
PDW BLD-RTO: 14.2 % (ref 11.5–15)
PLATELET # BLD: 192 K/UL (ref 130–450)
PMV BLD AUTO: 9.5 FL (ref 7–12)
POTASSIUM SERPL-SCNC: 4.7 MMOL/L (ref 3.5–5.1)
RBC # BLD: 3.61 M/UL (ref 3.8–5.8)
RBC # BLD: ABNORMAL 10*6/UL
SODIUM BLD-SCNC: 138 MMOL/L (ref 136–145)
TOTAL PROTEIN: 6.7 G/DL (ref 6.4–8.3)
TRIGL SERPL-MCNC: 84 MG/DL
VLDLC SERPL CALC-MCNC: 17 MG/DL
WBC # BLD: 2.4 K/UL (ref 4.5–11.5)

## 2025-08-04 RX ORDER — TAMSULOSIN HYDROCHLORIDE 0.4 MG/1
0.8 CAPSULE ORAL DAILY
Qty: 180 CAPSULE | Refills: 1 | Status: SHIPPED | OUTPATIENT
Start: 2025-08-04

## 2025-08-04 RX ORDER — PROPRANOLOL HYDROCHLORIDE 40 MG/1
40 TABLET ORAL 2 TIMES DAILY
Qty: 180 TABLET | Refills: 1 | Status: SHIPPED | OUTPATIENT
Start: 2025-08-04

## 2025-08-04 RX ORDER — FOLIC ACID 1 MG/1
1000 TABLET ORAL DAILY
Qty: 90 TABLET | Refills: 1 | Status: SHIPPED | OUTPATIENT
Start: 2025-08-04

## 2025-08-04 RX ORDER — DICYCLOMINE HYDROCHLORIDE 10 MG/1
10 CAPSULE ORAL 2 TIMES DAILY
Qty: 220 CAPSULE | Refills: 1 | Status: SHIPPED | OUTPATIENT
Start: 2025-08-04

## 2025-08-04 RX ORDER — LISINOPRIL 10 MG/1
10 TABLET ORAL DAILY
Qty: 30 TABLET | Refills: 1 | Status: SHIPPED | OUTPATIENT
Start: 2025-08-04

## 2025-08-04 RX ORDER — SERTRALINE HYDROCHLORIDE 100 MG/1
100 TABLET, FILM COATED ORAL DAILY
Qty: 90 TABLET | Refills: 1 | Status: SHIPPED | OUTPATIENT
Start: 2025-08-04

## 2025-08-04 RX ORDER — ATORVASTATIN CALCIUM 10 MG/1
10 TABLET, FILM COATED ORAL DAILY
Qty: 90 TABLET | Refills: 1 | Status: SHIPPED | OUTPATIENT
Start: 2025-08-04

## 2025-08-04 RX ORDER — MERCAPTOPURINE 50 MG/1
125 TABLET ORAL DAILY
Qty: 225 TABLET | Refills: 1 | Status: SHIPPED | OUTPATIENT
Start: 2025-08-04

## 2025-08-04 ASSESSMENT — PATIENT HEALTH QUESTIONNAIRE - PHQ9
1. LITTLE INTEREST OR PLEASURE IN DOING THINGS: NOT AT ALL
SUM OF ALL RESPONSES TO PHQ QUESTIONS 1-9: 0
2. FEELING DOWN, DEPRESSED OR HOPELESS: NOT AT ALL

## 2025-08-04 ASSESSMENT — LIFESTYLE VARIABLES
HOW MANY STANDARD DRINKS CONTAINING ALCOHOL DO YOU HAVE ON A TYPICAL DAY: 1 OR 2
HOW OFTEN DURING THE LAST YEAR HAVE YOU FOUND THAT YOU WERE NOT ABLE TO STOP DRINKING ONCE YOU HAD STARTED: NEVER
HOW OFTEN DO YOU HAVE A DRINK CONTAINING ALCOHOL: 4 OR MORE TIMES A WEEK
HAVE YOU OR SOMEONE ELSE BEEN INJURED AS A RESULT OF YOUR DRINKING: NO
HOW OFTEN DURING THE LAST YEAR HAVE YOU FAILED TO DO WHAT WAS NORMALLY EXPECTED FROM YOU BECAUSE OF DRINKING: NEVER
HOW OFTEN DURING THE LAST YEAR HAVE YOU HAD A FEELING OF GUILT OR REMORSE AFTER DRINKING: NEVER
HAS A RELATIVE, FRIEND, DOCTOR, OR ANOTHER HEALTH PROFESSIONAL EXPRESSED CONCERN ABOUT YOUR DRINKING OR SUGGESTED YOU CUT DOWN: NO
HOW OFTEN DURING THE LAST YEAR HAVE YOU NEEDED AN ALCOHOLIC DRINK FIRST THING IN THE MORNING TO GET YOURSELF GOING AFTER A NIGHT OF HEAVY DRINKING: NEVER
HOW OFTEN DURING THE LAST YEAR HAVE YOU BEEN UNABLE TO REMEMBER WHAT HAPPENED THE NIGHT BEFORE BECAUSE YOU HAD BEEN DRINKING: NEVER

## 2025-08-05 ENCOUNTER — TELEPHONE (OUTPATIENT)
Dept: FAMILY MEDICINE CLINIC | Age: 75
End: 2025-08-05

## 2025-08-06 DIAGNOSIS — D72.819 LEUKOPENIA, UNSPECIFIED TYPE: ICD-10-CM

## 2025-08-06 DIAGNOSIS — D75.89 MACROCYTOSIS: ICD-10-CM

## 2025-08-06 DIAGNOSIS — D64.9 ANEMIA, UNSPECIFIED TYPE: ICD-10-CM

## 2025-08-06 LAB
FERRITIN: 2151 NG/ML
FOLATE: 28.1 NG/ML (ref 4.6–34.8)
IRON % SATURATION: ABNORMAL % (ref 20–55)
IRON: 194 UG/DL (ref 61–157)
TOTAL IRON BINDING CAPACITY: ABNORMAL UG/DL (ref 250–450)
VITAMIN B-12: 372 PG/ML (ref 232–1245)

## 2025-08-07 LAB — PATHOLOGIST REVIEW: NORMAL

## 2025-08-09 LAB — COPPER: 90.2 UG/DL (ref 70–140)

## 2025-08-20 ENCOUNTER — OFFICE VISIT (OUTPATIENT)
Dept: PODIATRY | Age: 75
End: 2025-08-20
Payer: MEDICARE

## 2025-08-20 VITALS
WEIGHT: 198 LBS | TEMPERATURE: 97.7 F | BODY MASS INDEX: 30.11 KG/M2 | DIASTOLIC BLOOD PRESSURE: 77 MMHG | SYSTOLIC BLOOD PRESSURE: 126 MMHG

## 2025-08-20 DIAGNOSIS — M20.22 HALLUX RIGIDUS OF BOTH FEET: Primary | ICD-10-CM

## 2025-08-20 DIAGNOSIS — M79.674 PAIN IN RIGHT TOE(S): ICD-10-CM

## 2025-08-20 DIAGNOSIS — M20.21 HALLUX RIGIDUS OF BOTH FEET: Primary | ICD-10-CM

## 2025-08-20 DIAGNOSIS — M79.675 PAIN IN LEFT TOE(S): ICD-10-CM

## 2025-08-20 PROCEDURE — 4004F PT TOBACCO SCREEN RCVD TLK: CPT | Performed by: PODIATRIST

## 2025-08-20 PROCEDURE — 3078F DIAST BP <80 MM HG: CPT | Performed by: PODIATRIST

## 2025-08-20 PROCEDURE — 3074F SYST BP LT 130 MM HG: CPT | Performed by: PODIATRIST

## 2025-08-20 PROCEDURE — G8417 CALC BMI ABV UP PARAM F/U: HCPCS | Performed by: PODIATRIST

## 2025-08-20 PROCEDURE — 3017F COLORECTAL CA SCREEN DOC REV: CPT | Performed by: PODIATRIST

## 2025-08-20 PROCEDURE — 1123F ACP DISCUSS/DSCN MKR DOCD: CPT | Performed by: PODIATRIST

## 2025-08-20 PROCEDURE — 1159F MED LIST DOCD IN RCRD: CPT | Performed by: PODIATRIST

## 2025-08-20 PROCEDURE — 99212 OFFICE O/P EST SF 10 MIN: CPT | Performed by: PODIATRIST

## 2025-08-20 PROCEDURE — G8427 DOCREV CUR MEDS BY ELIG CLIN: HCPCS | Performed by: PODIATRIST
